# Patient Record
Sex: MALE | Race: WHITE | Employment: OTHER | URBAN - METROPOLITAN AREA
[De-identification: names, ages, dates, MRNs, and addresses within clinical notes are randomized per-mention and may not be internally consistent; named-entity substitution may affect disease eponyms.]

---

## 2017-12-06 PROBLEM — S72.001A CLOSED RIGHT HIP FRACTURE, INITIAL ENCOUNTER (HCC): Status: ACTIVE | Noted: 2017-12-06

## 2017-12-06 PROBLEM — S72.011A CLOSED SUBCAPITAL FRACTURE OF RIGHT FEMUR (HCC): Status: ACTIVE | Noted: 2017-12-06

## 2017-12-08 PROBLEM — E44.0 MODERATE PROTEIN-CALORIE MALNUTRITION (HCC): Chronic | Status: ACTIVE | Noted: 2017-12-08

## 2019-03-03 ENCOUNTER — APPOINTMENT (OUTPATIENT)
Dept: GENERAL RADIOLOGY | Age: 84
End: 2019-03-03
Payer: MEDICARE

## 2019-03-03 ENCOUNTER — HOSPITAL ENCOUNTER (OUTPATIENT)
Age: 84
Setting detail: OBSERVATION
Discharge: HOME | End: 2019-03-05
Attending: EMERGENCY MEDICINE | Admitting: INTERNAL MEDICINE
Payer: MEDICARE

## 2019-03-03 DIAGNOSIS — R55 SYNCOPE AND COLLAPSE: Primary | ICD-10-CM

## 2019-03-03 LAB
ALBUMIN SERPL-MCNC: 3.8 G/DL (ref 3.5–5.2)
ALP BLD-CCNC: 98 U/L (ref 40–129)
ALT SERPL-CCNC: 12 U/L (ref 0–40)
ANION GAP SERPL CALCULATED.3IONS-SCNC: 10 MMOL/L (ref 7–16)
AST SERPL-CCNC: 19 U/L (ref 0–39)
BASOPHILS ABSOLUTE: 0.01 E9/L (ref 0–0.2)
BASOPHILS RELATIVE PERCENT: 0.1 % (ref 0–2)
BILIRUB SERPL-MCNC: 0.5 MG/DL (ref 0–1.2)
BUN BLDV-MCNC: 16 MG/DL (ref 8–23)
CALCIUM SERPL-MCNC: 8.8 MG/DL (ref 8.6–10.2)
CHLORIDE BLD-SCNC: 104 MMOL/L (ref 98–107)
CHP ED QC CHECK: YES
CO2: 25 MMOL/L (ref 22–29)
CREAT SERPL-MCNC: 0.8 MG/DL (ref 0.7–1.2)
EOSINOPHILS ABSOLUTE: 0 E9/L (ref 0.05–0.5)
EOSINOPHILS RELATIVE PERCENT: 0 % (ref 0–6)
GFR AFRICAN AMERICAN: >60
GFR NON-AFRICAN AMERICAN: >60 ML/MIN/1.73
GLUCOSE BLD-MCNC: 145 MG/DL
GLUCOSE BLD-MCNC: 164 MG/DL (ref 74–99)
HCT VFR BLD CALC: 43.9 % (ref 37–54)
HEMOGLOBIN: 14.7 G/DL (ref 12.5–16.5)
IMMATURE GRANULOCYTES #: 0.04 E9/L
IMMATURE GRANULOCYTES %: 0.4 % (ref 0–5)
LYMPHOCYTES ABSOLUTE: 0.8 E9/L (ref 1.5–4)
LYMPHOCYTES RELATIVE PERCENT: 8.1 % (ref 20–42)
MCH RBC QN AUTO: 32 PG (ref 26–35)
MCHC RBC AUTO-ENTMCNC: 33.5 % (ref 32–34.5)
MCV RBC AUTO: 95.6 FL (ref 80–99.9)
METER GLUCOSE: 145 MG/DL (ref 74–99)
MONOCYTES ABSOLUTE: 0.72 E9/L (ref 0.1–0.95)
MONOCYTES RELATIVE PERCENT: 7.3 % (ref 2–12)
NEUTROPHILS ABSOLUTE: 8.25 E9/L (ref 1.8–7.3)
NEUTROPHILS RELATIVE PERCENT: 84.1 % (ref 43–80)
PDW BLD-RTO: 13.4 FL (ref 11.5–15)
PLATELET # BLD: 163 E9/L (ref 130–450)
PMV BLD AUTO: 9.1 FL (ref 7–12)
POTASSIUM SERPL-SCNC: 4.4 MMOL/L (ref 3.5–5)
RBC # BLD: 4.59 E12/L (ref 3.8–5.8)
SODIUM BLD-SCNC: 139 MMOL/L (ref 132–146)
TOTAL PROTEIN: 6.6 G/DL (ref 6.4–8.3)
TROPONIN: <0.01 NG/ML (ref 0–0.03)
WBC # BLD: 9.8 E9/L (ref 4.5–11.5)

## 2019-03-03 PROCEDURE — G0378 HOSPITAL OBSERVATION PER HR: HCPCS

## 2019-03-03 PROCEDURE — 71045 X-RAY EXAM CHEST 1 VIEW: CPT

## 2019-03-03 PROCEDURE — 2580000003 HC RX 258: Performed by: INTERNAL MEDICINE

## 2019-03-03 PROCEDURE — 85025 COMPLETE CBC W/AUTO DIFF WBC: CPT

## 2019-03-03 PROCEDURE — 80053 COMPREHEN METABOLIC PANEL: CPT

## 2019-03-03 PROCEDURE — 84484 ASSAY OF TROPONIN QUANT: CPT

## 2019-03-03 PROCEDURE — 36415 COLL VENOUS BLD VENIPUNCTURE: CPT

## 2019-03-03 PROCEDURE — 82962 GLUCOSE BLOOD TEST: CPT

## 2019-03-03 PROCEDURE — 99285 EMERGENCY DEPT VISIT HI MDM: CPT

## 2019-03-03 PROCEDURE — 96360 HYDRATION IV INFUSION INIT: CPT

## 2019-03-03 PROCEDURE — 96361 HYDRATE IV INFUSION ADD-ON: CPT

## 2019-03-03 PROCEDURE — 93005 ELECTROCARDIOGRAM TRACING: CPT | Performed by: EMERGENCY MEDICINE

## 2019-03-03 PROCEDURE — 94760 N-INVAS EAR/PLS OXIMETRY 1: CPT

## 2019-03-03 RX ORDER — CARBAMAZEPINE 200 MG/1
200 TABLET ORAL
Status: DISCONTINUED | OUTPATIENT
Start: 2019-03-04 | End: 2019-03-05 | Stop reason: HOSPADM

## 2019-03-03 RX ORDER — SODIUM CHLORIDE 9 MG/ML
INJECTION, SOLUTION INTRAVENOUS CONTINUOUS
Status: DISCONTINUED | OUTPATIENT
Start: 2019-03-03 | End: 2019-03-04

## 2019-03-03 RX ORDER — TAMSULOSIN HYDROCHLORIDE 0.4 MG/1
0.4 CAPSULE ORAL
Status: DISCONTINUED | OUTPATIENT
Start: 2019-03-03 | End: 2019-03-05 | Stop reason: HOSPADM

## 2019-03-03 RX ORDER — METOPROLOL SUCCINATE 25 MG/1
12.5 TABLET, EXTENDED RELEASE ORAL
Status: DISCONTINUED | OUTPATIENT
Start: 2019-03-04 | End: 2019-03-05 | Stop reason: HOSPADM

## 2019-03-03 RX ORDER — ASPIRIN 81 MG/1
81 TABLET, CHEWABLE ORAL
Status: DISCONTINUED | OUTPATIENT
Start: 2019-03-04 | End: 2019-03-05 | Stop reason: HOSPADM

## 2019-03-03 RX ORDER — FINASTERIDE 5 MG/1
5 TABLET, FILM COATED ORAL
Status: DISCONTINUED | OUTPATIENT
Start: 2019-03-04 | End: 2019-03-05 | Stop reason: HOSPADM

## 2019-03-03 RX ADMIN — SODIUM CHLORIDE: 9 INJECTION, SOLUTION INTRAVENOUS at 23:05

## 2019-03-04 ENCOUNTER — APPOINTMENT (OUTPATIENT)
Dept: CT IMAGING | Age: 84
End: 2019-03-04
Payer: MEDICARE

## 2019-03-04 PROBLEM — I10 ESSENTIAL HYPERTENSION: Chronic | Status: ACTIVE | Noted: 2019-03-04

## 2019-03-04 PROBLEM — G40.909 SEIZURE DISORDER (HCC): Chronic | Status: ACTIVE | Noted: 2019-03-04

## 2019-03-04 PROBLEM — E44.0 MODERATE PROTEIN-CALORIE MALNUTRITION (HCC): Chronic | Status: RESOLVED | Noted: 2017-12-08 | Resolved: 2019-03-04

## 2019-03-04 PROBLEM — S72.011A CLOSED SUBCAPITAL FRACTURE OF RIGHT FEMUR (HCC): Status: RESOLVED | Noted: 2017-12-06 | Resolved: 2019-03-04

## 2019-03-04 LAB
ANION GAP SERPL CALCULATED.3IONS-SCNC: 12 MMOL/L (ref 7–16)
BASOPHILS ABSOLUTE: 0.02 E9/L (ref 0–0.2)
BASOPHILS RELATIVE PERCENT: 0.3 % (ref 0–2)
BUN BLDV-MCNC: 18 MG/DL (ref 8–23)
CALCIUM SERPL-MCNC: 8.7 MG/DL (ref 8.6–10.2)
CHLORIDE BLD-SCNC: 104 MMOL/L (ref 98–107)
CO2: 24 MMOL/L (ref 22–29)
CREAT SERPL-MCNC: 0.9 MG/DL (ref 0.7–1.2)
EOSINOPHILS ABSOLUTE: 0 E9/L (ref 0.05–0.5)
EOSINOPHILS RELATIVE PERCENT: 0 % (ref 0–6)
GFR AFRICAN AMERICAN: >60
GFR NON-AFRICAN AMERICAN: >60 ML/MIN/1.73
GLUCOSE BLD-MCNC: 98 MG/DL (ref 74–99)
HCT VFR BLD CALC: 41.1 % (ref 37–54)
HEMOGLOBIN: 14 G/DL (ref 12.5–16.5)
IMMATURE GRANULOCYTES #: 0.03 E9/L
IMMATURE GRANULOCYTES %: 0.4 % (ref 0–5)
LV EF: 60 %
LVEF MODALITY: NORMAL
LYMPHOCYTES ABSOLUTE: 1.62 E9/L (ref 1.5–4)
LYMPHOCYTES RELATIVE PERCENT: 23.7 % (ref 20–42)
MCH RBC QN AUTO: 32.4 PG (ref 26–35)
MCHC RBC AUTO-ENTMCNC: 34.1 % (ref 32–34.5)
MCV RBC AUTO: 95.1 FL (ref 80–99.9)
MONOCYTES ABSOLUTE: 0.93 E9/L (ref 0.1–0.95)
MONOCYTES RELATIVE PERCENT: 13.6 % (ref 2–12)
NEUTROPHILS ABSOLUTE: 4.23 E9/L (ref 1.8–7.3)
NEUTROPHILS RELATIVE PERCENT: 62 % (ref 43–80)
PDW BLD-RTO: 13.5 FL (ref 11.5–15)
PLATELET # BLD: 158 E9/L (ref 130–450)
PMV BLD AUTO: 9 FL (ref 7–12)
POTASSIUM SERPL-SCNC: 4.2 MMOL/L (ref 3.5–5)
RBC # BLD: 4.32 E12/L (ref 3.8–5.8)
SODIUM BLD-SCNC: 140 MMOL/L (ref 132–146)
WBC # BLD: 6.8 E9/L (ref 4.5–11.5)

## 2019-03-04 PROCEDURE — 99218 PR INITIAL OBSERVATION CARE/DAY 30 MINUTES: CPT | Performed by: PSYCHIATRY & NEUROLOGY

## 2019-03-04 PROCEDURE — G0378 HOSPITAL OBSERVATION PER HR: HCPCS

## 2019-03-04 PROCEDURE — 6370000000 HC RX 637 (ALT 250 FOR IP): Performed by: INTERNAL MEDICINE

## 2019-03-04 PROCEDURE — 93306 TTE W/DOPPLER COMPLETE: CPT

## 2019-03-04 PROCEDURE — 80048 BASIC METABOLIC PNL TOTAL CA: CPT

## 2019-03-04 PROCEDURE — 70450 CT HEAD/BRAIN W/O DYE: CPT

## 2019-03-04 PROCEDURE — 85025 COMPLETE CBC W/AUTO DIFF WBC: CPT

## 2019-03-04 PROCEDURE — 96372 THER/PROPH/DIAG INJ SC/IM: CPT

## 2019-03-04 PROCEDURE — 6360000002 HC RX W HCPCS: Performed by: INTERNAL MEDICINE

## 2019-03-04 PROCEDURE — 36415 COLL VENOUS BLD VENIPUNCTURE: CPT

## 2019-03-04 RX ADMIN — ASPIRIN 81 MG 81 MG: 81 TABLET ORAL at 06:32

## 2019-03-04 RX ADMIN — METOPROLOL SUCCINATE 12.5 MG: 25 TABLET, EXTENDED RELEASE ORAL at 06:33

## 2019-03-04 RX ADMIN — CARBAMAZEPINE 200 MG: 200 TABLET ORAL at 06:33

## 2019-03-04 RX ADMIN — ENOXAPARIN SODIUM 30 MG: 30 INJECTION SUBCUTANEOUS at 10:12

## 2019-03-04 RX ADMIN — TAMSULOSIN HYDROCHLORIDE 0.4 MG: 0.4 CAPSULE ORAL at 16:06

## 2019-03-04 RX ADMIN — FINASTERIDE 5 MG: 5 TABLET, FILM COATED ORAL at 06:33

## 2019-03-04 ASSESSMENT — ENCOUNTER SYMPTOMS
PHOTOPHOBIA: 0
BACK PAIN: 0
CHEST TIGHTNESS: 0
SHORTNESS OF BREATH: 0
COLOR CHANGE: 0

## 2019-03-04 ASSESSMENT — VISUAL ACUITY: VA_NORMAL: 1

## 2019-03-04 ASSESSMENT — PAIN SCALES - GENERAL
PAINLEVEL_OUTOF10: 0

## 2019-03-05 ENCOUNTER — APPOINTMENT (OUTPATIENT)
Dept: NEUROLOGY | Age: 84
End: 2019-03-05
Payer: MEDICARE

## 2019-03-05 VITALS
RESPIRATION RATE: 18 BRPM | OXYGEN SATURATION: 96 % | HEART RATE: 66 BPM | HEIGHT: 72 IN | SYSTOLIC BLOOD PRESSURE: 178 MMHG | WEIGHT: 175 LBS | TEMPERATURE: 99 F | DIASTOLIC BLOOD PRESSURE: 80 MMHG | BODY MASS INDEX: 23.7 KG/M2

## 2019-03-05 LAB
EKG ATRIAL RATE: 86 BPM
EKG P AXIS: 59 DEGREES
EKG P-R INTERVAL: 222 MS
EKG Q-T INTERVAL: 382 MS
EKG QRS DURATION: 94 MS
EKG QTC CALCULATION (BAZETT): 457 MS
EKG R AXIS: -61 DEGREES
EKG T AXIS: 62 DEGREES
EKG VENTRICULAR RATE: 86 BPM

## 2019-03-05 PROCEDURE — 95819 EEG AWAKE AND ASLEEP: CPT

## 2019-03-05 PROCEDURE — 99225 PR SBSQ OBSERVATION CARE/DAY 25 MINUTES: CPT | Performed by: NURSE PRACTITIONER

## 2019-03-05 PROCEDURE — 95819 EEG AWAKE AND ASLEEP: CPT | Performed by: PSYCHIATRY & NEUROLOGY

## 2019-03-05 PROCEDURE — 6370000000 HC RX 637 (ALT 250 FOR IP): Performed by: INTERNAL MEDICINE

## 2019-03-05 PROCEDURE — G0378 HOSPITAL OBSERVATION PER HR: HCPCS

## 2019-03-05 RX ADMIN — FINASTERIDE 5 MG: 5 TABLET, FILM COATED ORAL at 06:38

## 2019-03-05 RX ADMIN — METOPROLOL SUCCINATE 12.5 MG: 25 TABLET, EXTENDED RELEASE ORAL at 06:48

## 2019-03-05 RX ADMIN — ASPIRIN 81 MG 81 MG: 81 TABLET ORAL at 06:38

## 2019-03-05 RX ADMIN — CARBAMAZEPINE 200 MG: 200 TABLET ORAL at 06:38

## 2019-03-05 ASSESSMENT — PAIN SCALES - GENERAL
PAINLEVEL_OUTOF10: 0
PAINLEVEL_OUTOF10: 0

## 2019-05-01 ENCOUNTER — APPOINTMENT (OUTPATIENT)
Dept: GENERAL RADIOLOGY | Age: 84
End: 2019-05-01
Payer: MEDICARE

## 2019-05-01 ENCOUNTER — APPOINTMENT (OUTPATIENT)
Dept: GENERAL RADIOLOGY | Age: 84
DRG: 200 | End: 2019-05-01
Payer: MEDICARE

## 2019-05-01 ENCOUNTER — APPOINTMENT (OUTPATIENT)
Dept: CT IMAGING | Age: 84
End: 2019-05-01
Payer: MEDICARE

## 2019-05-01 ENCOUNTER — HOSPITAL ENCOUNTER (INPATIENT)
Age: 84
LOS: 5 days | Discharge: NURSING, REHAB OR LONG-TERM CARE | DRG: 200 | End: 2019-05-06
Attending: EMERGENCY MEDICINE | Admitting: SURGERY
Payer: MEDICARE

## 2019-05-01 ENCOUNTER — HOSPITAL ENCOUNTER (OUTPATIENT)
Age: 84
Discharge: HOME | End: 2019-05-01
Payer: MEDICARE

## 2019-05-01 ENCOUNTER — HOSPITAL ENCOUNTER (EMERGENCY)
Age: 84
Discharge: ANOTHER HOSPITAL | End: 2019-05-01
Attending: EMERGENCY MEDICINE
Payer: MEDICARE

## 2019-05-01 VITALS
TEMPERATURE: 98.1 F | DIASTOLIC BLOOD PRESSURE: 80 MMHG | RESPIRATION RATE: 16 BRPM | OXYGEN SATURATION: 93 % | BODY MASS INDEX: 23.7 KG/M2 | HEIGHT: 72 IN | HEART RATE: 75 BPM | WEIGHT: 175 LBS | SYSTOLIC BLOOD PRESSURE: 191 MMHG

## 2019-05-01 DIAGNOSIS — S42.412A CLOSED SUPRACONDYLAR FRACTURE OF LEFT HUMERUS, INITIAL ENCOUNTER: ICD-10-CM

## 2019-05-01 DIAGNOSIS — S42.115A CLOSED NONDISPLACED FRACTURE OF BODY OF LEFT SCAPULA, INITIAL ENCOUNTER: ICD-10-CM

## 2019-05-01 DIAGNOSIS — S22.32XA CLOSED FRACTURE OF ONE RIB OF LEFT SIDE, INITIAL ENCOUNTER: ICD-10-CM

## 2019-05-01 DIAGNOSIS — T14.90XA TRAUMA: Primary | ICD-10-CM

## 2019-05-01 DIAGNOSIS — J93.83 ACUTE PNEUMOTHORAX: ICD-10-CM
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
## 2019-05-01 LAB
ANION GAP SERPL CALCULATED.3IONS-SCNC: 9 MMOL/L (ref 7–16)
BASOPHILS ABSOLUTE: 0.02 E9/L (ref 0–0.2)
BASOPHILS RELATIVE PERCENT: 0.2 % (ref 0–2)
BUN BLDV-MCNC: 23 MG/DL (ref 8–23)
CALCIUM SERPL-MCNC: 9 MG/DL (ref 8.6–10.2)
CHLORIDE BLD-SCNC: 104 MMOL/L (ref 98–107)
CO2: 26 MMOL/L (ref 22–29)
CREAT SERPL-MCNC: 0.7 MG/DL (ref 0.7–1.2)
EOSINOPHILS ABSOLUTE: 0.03 E9/L (ref 0.05–0.5)
EOSINOPHILS RELATIVE PERCENT: 0.3 % (ref 0–6)
GFR AFRICAN AMERICAN: >60
GFR NON-AFRICAN AMERICAN: >60 ML/MIN/1.73
GLUCOSE BLD-MCNC: 114 MG/DL (ref 74–99)
HCT VFR BLD CALC: 40.8 % (ref 37–54)
HEMOGLOBIN: 13.9 G/DL (ref 12.5–16.5)
IMMATURE GRANULOCYTES #: 0.05 E9/L
IMMATURE GRANULOCYTES %: 0.5 % (ref 0–5)
LYMPHOCYTES ABSOLUTE: 1.45 E9/L (ref 1.5–4)
LYMPHOCYTES RELATIVE PERCENT: 14.8 % (ref 20–42)
MCH RBC QN AUTO: 32.7 PG (ref 26–35)
MCHC RBC AUTO-ENTMCNC: 34.1 % (ref 32–34.5)
MCV RBC AUTO: 96 FL (ref 80–99.9)
MONOCYTES ABSOLUTE: 0.71 E9/L (ref 0.1–0.95)
MONOCYTES RELATIVE PERCENT: 7.3 % (ref 2–12)
NEUTROPHILS ABSOLUTE: 7.51 E9/L (ref 1.8–7.3)
NEUTROPHILS RELATIVE PERCENT: 76.9 % (ref 43–80)
PDW BLD-RTO: 13.8 FL (ref 11.5–15)
PLATELET # BLD: 142 E9/L (ref 130–450)
PMV BLD AUTO: 9.3 FL (ref 7–12)
POTASSIUM SERPL-SCNC: 4.4 MMOL/L (ref 3.5–5)
RBC # BLD: 4.25 E12/L (ref 3.8–5.8)
SODIUM BLD-SCNC: 139 MMOL/L (ref 132–146)
TROPONIN: <0.01 NG/ML (ref 0–0.03)
WBC # BLD: 9.8 E9/L (ref 4.5–11.5)

## 2019-05-01 PROCEDURE — 2500000003 HC RX 250 WO HCPCS

## 2019-05-01 PROCEDURE — 99285 EMERGENCY DEPT VISIT HI MDM: CPT

## 2019-05-01 PROCEDURE — 84484 ASSAY OF TROPONIN QUANT: CPT

## 2019-05-01 PROCEDURE — 71250 CT THORAX DX C-: CPT

## 2019-05-01 PROCEDURE — 73030 X-RAY EXAM OF SHOULDER: CPT

## 2019-05-01 PROCEDURE — 96374 THER/PROPH/DIAG INJ IV PUSH: CPT

## 2019-05-01 PROCEDURE — 32551 INSERTION OF CHEST TUBE: CPT

## 2019-05-01 PROCEDURE — A0426 ALS 1: HCPCS

## 2019-05-01 PROCEDURE — 70450 CT HEAD/BRAIN W/O DYE: CPT

## 2019-05-01 PROCEDURE — 71045 X-RAY EXAM CHEST 1 VIEW: CPT

## 2019-05-01 PROCEDURE — 94760 N-INVAS EAR/PLS OXIMETRY 1: CPT

## 2019-05-01 PROCEDURE — 85025 COMPLETE CBC W/AUTO DIFF WBC: CPT

## 2019-05-01 PROCEDURE — 2060000000 HC ICU INTERMEDIATE R&B

## 2019-05-01 PROCEDURE — 99284 EMERGENCY DEPT VISIT MOD MDM: CPT

## 2019-05-01 PROCEDURE — 6370000000 HC RX 637 (ALT 250 FOR IP): Performed by: EMERGENCY MEDICINE

## 2019-05-01 PROCEDURE — 73060 X-RAY EXAM OF HUMERUS: CPT

## 2019-05-01 PROCEDURE — 71101 X-RAY EXAM UNILAT RIBS/CHEST: CPT

## 2019-05-01 PROCEDURE — 80048 BASIC METABOLIC PNL TOTAL CA: CPT

## 2019-05-01 PROCEDURE — 93005 ELECTROCARDIOGRAM TRACING: CPT | Performed by: EMERGENCY MEDICINE

## 2019-05-01 PROCEDURE — 6360000002 HC RX W HCPCS

## 2019-05-01 PROCEDURE — A0425 GROUND MILEAGE: HCPCS

## 2019-05-01 PROCEDURE — 72125 CT NECK SPINE W/O DYE: CPT

## 2019-05-01 RX ORDER — DOCUSATE SODIUM 100 MG/1
100 CAPSULE, LIQUID FILLED ORAL 2 TIMES DAILY
Status: DISCONTINUED | OUTPATIENT
Start: 2019-05-02 | End: 2019-05-06 | Stop reason: HOSPADM

## 2019-05-01 RX ORDER — ACETAMINOPHEN 650 MG
TABLET, EXTENDED RELEASE ORAL
Status: COMPLETED
Start: 2019-05-01 | End: 2019-05-01

## 2019-05-01 RX ORDER — FENTANYL CITRATE 50 UG/ML
50 INJECTION, SOLUTION INTRAMUSCULAR; INTRAVENOUS ONCE
Status: COMPLETED | OUTPATIENT
Start: 2019-05-01 | End: 2019-05-01

## 2019-05-01 RX ORDER — MORPHINE SULFATE 2 MG/ML
2 INJECTION, SOLUTION INTRAMUSCULAR; INTRAVENOUS EVERY 4 HOURS PRN
Status: DISCONTINUED | OUTPATIENT
Start: 2019-05-01 | End: 2019-05-06

## 2019-05-01 RX ORDER — SODIUM CHLORIDE 0.9 % (FLUSH) 0.9 %
10 SYRINGE (ML) INJECTION PRN
Status: DISCONTINUED | OUTPATIENT
Start: 2019-05-01 | End: 2019-05-06 | Stop reason: HOSPADM

## 2019-05-01 RX ORDER — OXYCODONE HYDROCHLORIDE 5 MG/1
5 TABLET ORAL EVERY 4 HOURS PRN
Status: DISCONTINUED | OUTPATIENT
Start: 2019-05-01 | End: 2019-05-06

## 2019-05-01 RX ORDER — OXYCODONE HYDROCHLORIDE 10 MG/1
10 TABLET ORAL EVERY 4 HOURS PRN
Status: DISCONTINUED | OUTPATIENT
Start: 2019-05-01 | End: 2019-05-06

## 2019-05-01 RX ORDER — LIDOCAINE HYDROCHLORIDE 10 MG/ML
INJECTION, SOLUTION INFILTRATION; PERINEURAL
Status: COMPLETED
Start: 2019-05-01 | End: 2019-05-01

## 2019-05-01 RX ORDER — SODIUM CHLORIDE 0.9 % (FLUSH) 0.9 %
10 SYRINGE (ML) INJECTION EVERY 12 HOURS SCHEDULED
Status: DISCONTINUED | OUTPATIENT
Start: 2019-05-02 | End: 2019-05-06 | Stop reason: HOSPADM

## 2019-05-01 RX ORDER — FENTANYL CITRATE 50 UG/ML
INJECTION, SOLUTION INTRAMUSCULAR; INTRAVENOUS
Status: COMPLETED
Start: 2019-05-01 | End: 2019-05-01

## 2019-05-01 RX ORDER — LIDOCAINE HYDROCHLORIDE 10 MG/ML
5 INJECTION, SOLUTION INFILTRATION; PERINEURAL ONCE
Status: COMPLETED | OUTPATIENT
Start: 2019-05-01 | End: 2019-05-01

## 2019-05-01 RX ORDER — ONDANSETRON 2 MG/ML
4 INJECTION INTRAMUSCULAR; INTRAVENOUS EVERY 6 HOURS PRN
Status: DISCONTINUED | OUTPATIENT
Start: 2019-05-01 | End: 2019-05-06 | Stop reason: HOSPADM

## 2019-05-01 RX ORDER — ACETAMINOPHEN 325 MG/1
650 TABLET ORAL EVERY 4 HOURS PRN
Status: DISCONTINUED | OUTPATIENT
Start: 2019-05-01 | End: 2019-05-06 | Stop reason: HOSPADM

## 2019-05-01 RX ORDER — SENNA PLUS 8.6 MG/1
1 TABLET ORAL DAILY PRN
Status: DISCONTINUED | OUTPATIENT
Start: 2019-05-01 | End: 2019-05-05

## 2019-05-01 RX ORDER — ACETAMINOPHEN 325 MG/1
650 TABLET ORAL ONCE
Status: COMPLETED | OUTPATIENT
Start: 2019-05-01 | End: 2019-05-01

## 2019-05-01 RX ADMIN — FENTANYL CITRATE 50 MCG: 50 INJECTION, SOLUTION INTRAMUSCULAR; INTRAVENOUS at 19:17

## 2019-05-01 RX ADMIN — ACETAMINOPHEN 650 MG: 325 TABLET ORAL at 17:22

## 2019-05-01 RX ADMIN — Medication: at 19:41

## 2019-05-01 RX ADMIN — LIDOCAINE HYDROCHLORIDE 5 ML: 10 INJECTION, SOLUTION INFILTRATION; PERINEURAL at 19:40

## 2019-05-01 ASSESSMENT — PAIN DESCRIPTION - ORIENTATION: ORIENTATION: LEFT

## 2019-05-01 ASSESSMENT — PAIN DESCRIPTION - LOCATION: LOCATION: RIB CAGE;ARM

## 2019-05-01 ASSESSMENT — ENCOUNTER SYMPTOMS
BACK PAIN: 0
COUGH: 0
SHORTNESS OF BREATH: 0
EYE PAIN: 0
EYE REDNESS: 0
ABDOMINAL PAIN: 0
VOMITING: 0
EYE DISCHARGE: 0
NAUSEA: 0
SORE THROAT: 0
SINUS PRESSURE: 0
DIARRHEA: 0
WHEEZING: 0

## 2019-05-01 ASSESSMENT — PAIN DESCRIPTION - PAIN TYPE: TYPE: ACUTE PAIN

## 2019-05-01 ASSESSMENT — PAIN SCALES - GENERAL
PAINLEVEL_OUTOF10: 0
PAINLEVEL_OUTOF10: 6

## 2019-05-01 ASSESSMENT — PAIN DESCRIPTION - DESCRIPTORS: DESCRIPTORS: ACHING

## 2019-05-01 ASSESSMENT — PAIN - FUNCTIONAL ASSESSMENT: PAIN_FUNCTIONAL_ASSESSMENT: ACTIVITIES ARE NOT PREVENTED

## 2019-05-01 NOTE — ED PROVIDER NOTES
78-year-old male history of hypertension on no anticoagulation. Presents emergency department after a fall yesterday approximately 25 hours prior to arrival patient states he hit his left shoulder and ribs when he tripped over a rug. He is unsure if he hit his head. Patient states left shoulder and left rib pain at this time. He denies other injuries denies dizziness shortness of breath, abdominal pain, or extremity injuries outside left shoulder. The history is provided by the patient. Fall   The accident occurred yesterday. He fell from a height of 1 to 2 ft. He landed on carpet. There was no blood loss. The point of impact was the left shoulder (left ribs). The pain is present in the left shoulder. The pain is at a severity of 2/10. The pain is mild. He was ambulatory at the scene. There was no entrapment after the fall. There was no drug use involved in the accident. There was no alcohol use involved in the accident. Pertinent negatives include no fever, no abdominal pain, no nausea, no vomiting, no headaches, no hearing loss and no loss of consciousness. He has tried nothing for the symptoms. The treatment provided no relief. Review of Systems   Constitutional: Negative for chills and fever. HENT: Negative for ear pain, sinus pressure and sore throat. Eyes: Negative for pain, discharge and redness. Respiratory: Negative for cough, shortness of breath and wheezing. Cardiovascular: Positive for chest pain. Gastrointestinal: Negative for abdominal pain, diarrhea, nausea and vomiting. Genitourinary: Negative for dysuria and frequency. Musculoskeletal: Positive for arthralgias. Negative for back pain. Skin: Negative for rash and wound. Neurological: Negative for loss of consciousness, weakness and headaches. Hematological: Negative for adenopathy. All other systems reviewed and are negative. Physical Exam   Constitutional: He is oriented to person, place, and time.  He appears well-developed and well-nourished. No distress. HENT:   Head: Normocephalic and atraumatic. Eyes: Pupils are equal, round, and reactive to light. EOM are normal.   Neck: Normal range of motion. Neck supple. Cardiovascular: Normal rate and regular rhythm. Pulmonary/Chest: He exhibits bony tenderness. He exhibits no crepitus. Neurological: He is alert and oriented to person, place, and time. Chest Tube  Date/Time: 5/1/2019 7:43 PM  Performed by: Carol Fitzgerald DO  Authorized by: Omid Downey MD     Consent:     Consent obtained:  Written    Consent given by:  Patient    Risks discussed:  Bleeding, damage to surrounding structures, infection, nerve damage and pain  Pre-procedure details:     Skin preparation:  Betadine    Preparation: Patient was prepped and draped in the usual sterile fashion    Sedation:     Sedation type: Anxiolysis  Anesthesia (see MAR for exact dosages): Anesthesia method:  Local infiltration    Local anesthetic:  Lidocaine 1% w/o epi  Procedure details:     Placement location:  L lateral    Scalpel size:  11    Tube size ([place]): 9 [place]. Ultrasound guidance: no      Tension pneumothorax: no      Tube connected to:  Suction    Drainage characteristics:  Air only    Suture material:  2-0 silk    Dressing:  4x4 sterile gauze, petrolatum-impregnated gauze and Xeroform gauze  Post-procedure details:     Post-insertion x-ray findings comment:  Tube in place but lung did not reexpand    Patient tolerance of procedure: Tolerated well, no immediate complications        MDM    ED Course as of May 01 2221   Wed May 01, 2019   1556 Patient seen and examined concern for trauma. X-rays and CT of the abdomen ordered. Patient offered Tylenol 2. he states he does not want    [CF]   1942 Patient found to have pneumothorax on chest CT. He was given pain medication at a chest tube was placed.  I did speak with Dr. Tip Payton at the [address] [place] [address] emergency department for transfer consultation. He was accepted auto except by trauma services at [facility]. [CF]   1943 Imaging did not indicate a reexpansion of the left lung after chest tube was placed. These findings were updated to Dr. Emilee Pearson    [CF]      ED Course User Index  [CF] Benny Daniel, DO     -------------------------------------------- PAST HISTORY ---------------------------------------------  Past Medical History:  has a past medical history of Hypertension. Past Surgical History:  has a past surgical history that includes hernia repair and hip surgery (Right, 12/07/2017). Social History:  reports that he quit smoking about 74 years ago. He quit after 2.00 years of use. He has never used smokeless tobacco. He reports that he drinks alcohol. He reports that he does not use drugs. Family History: family history is not on file. The patients home medications have been reviewed. Allergies: Patient has no known allergies.     -------------------------------------------------- RESULTS -------------------------------------------------    Lab  Results for orders placed or performed during the hospital encounter of 05/01/19   CBC auto differential   Result Value Ref Range    WBC 9.8 4.5 - 11.5 E9/L    RBC 4.25 3.80 - 5.80 E12/L    Hemoglobin 13.9 12.5 - 16.5 g/dL    Hematocrit 40.8 37.0 - 54.0 %    MCV 96.0 80.0 - 99.9 fL    MCH 32.7 26.0 - 35.0 pg    MCHC 34.1 32.0 - 34.5 %    RDW 13.8 11.5 - 15.0 fL    Platelets 813 130 - 444 E9/L    MPV 9.3 7.0 - 12.0 fL    Neutrophils % 76.9 43.0 - 80.0 %    Immature Granulocytes % 0.5 0.0 - 5.0 %    Lymphocytes % 14.8 (L) 20.0 - 42.0 %    Monocytes % 7.3 2.0 - 12.0 %    Eosinophils % 0.3 0.0 - 6.0 %    Basophils % 0.2 0.0 - 2.0 %    Neutrophils # 7.51 (H) 1.80 - 7.30 E9/L    Immature Granulocytes # 0.05 E9/L    Lymphocytes # 1.45 (L) 1.50 - 4.00 E9/L    Monocytes # 0.71 0.10 - 0.95 E9/L    Eosinophils # 0.03 (L) 0.05 - 0.50 E9/L    Basophils # 0.02 0.00 - 0.20 E9/L Basic Metabolic Panel   Result Value Ref Range    Sodium 139 132 - 146 mmol/L    Potassium 4.4 3.5 - 5.0 mmol/L    Chloride 104 98 - 107 mmol/L    CO2 26 22 - 29 mmol/L    Anion Gap 9 7 - 16 mmol/L    Glucose 114 (H) 74 - 99 mg/dL    BUN 23 8 - 23 mg/dL    CREATININE 0.7 0.7 - 1.2 mg/dL    GFR Non-African American >60 >=60 mL/min/1.73    GFR African American >60     Calcium 9.0 8.6 - 10.2 mg/dL   Troponin   Result Value Ref Range    Troponin <0.01 0.00 - 0.03 ng/mL       Radiology  Xr Ribs Left Include Chest (min 3 Views)    Result Date: 2019  Patient MRN: [id number] : 1922 Age:  80 years Gender: Male Order Date: 2019 3:30 PM. Exam: XR RIBS LEFT INCLUDE CHEST (MIN 3 VIEWS) Number of Views: 5 Indication:  Left shoulder rib and arm pain Comparison: Radiographs 3/3/2019 Findings: Stable, enlarged cardiomediastinal silhouette with an abnormal masslike density projecting along the left cardiac border measured approximately 3.1 x 3.3 cm, not identified on the previous exam. Vascular calcifications thoracic aorta. Osteopenia. Age-indeterminate fracture of the left proximal humerus. Comminuted fracture of the left scapula. . Vascular calcifications thoracic aorta. No pneumothorax. No definitive displaced fracture of the left ribs is seen on limited fracture evaluation. ALERT:  THIS IS AN ABNORMAL REPORT 1. Comminuted left scapular fracture. 2. Age-indeterminate fracture left proximal humerus. 3. Nodular abnormality or mass projecting along the left cardiac border not seen on previous exam. Findings are indeterminate and could reflect pulmonary mass. The possibility of a cardiac aneurysm is also not excluded. Further evaluation with CT scan of the chest is recommended.  4. No definitive displaced rib fracture is noted, this can be further evaluated with the above recommended CT scan of the chest.    Xr Humerus Left (min 2 Views)    Result Date: 2019  Patient MRN: [id number] : 1922 Age:  80 years Gender: Male Order Date: 5/1/2019 3:30 PM Exam: XR HUMERUS LEFT (MIN 2 VIEWS) Number of Images: 4 views Indication:   Pain Pain Comparison: None. Findings: There is left humeral neck fracture which is displaced superiorly. There is severe osteopenia of the osseous structure. There is a horizontal fracture of the scapula extending to the glenoid fossa. . The shoulder joint and elbow joint appear to be normal.     Left humeral neck fracture which is displaced superiorly Left scapula fracture extending to the glenoid fossa. Severe osteopenia. ALERT:  THIS IS AN ABNORMAL REPORT    Ct Head Wo Contrast    Result Date: 5/1/2019  Clinical indications: Pain status post trauma. Cranial trauma. COMPARISON: March 4, 2019. Exposure control: This examination and all examinations utilizing ionizing radiation at this facility done so according to the ALARA (as low as reasonably achievable) principal for radiation dose reduction. Technique: Axial, sagittal and coronal computed tomography of the brain and calvarium was performed without contrast. FINDINGS: There is no evidence for acute intracranial hemorrhage, midline shift or mass effect. There is enlargement of the ventricles, sulci and cisterns bilaterally. There is patchy hypoattenuation in the periventricular deep white matter bilaterally. There are calcifications within the carotid siphons and vertebrobasilar arterial systems. Mucosal thickening within the paranasal sinuses but no fluid levels are evident. Otherwise the brain parenchyma, CSF spaces, paranasal sinuses and mastoid air cells and surrounding soft tissue and osseous structures have a satisfactory appearance. The gray-white matter junction is otherwise preserved. The cerebellopontine angle and cisterns are unremarkable. The bony calvarium is negative for acute fracture or aggressive process. The alexi and brainstem are unremarkable. No evidence for acute intracranial process.  Cortical atrophy and chronic There is a left scapular fracture. 4. 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 No acute mediastinal vascular trauma is noted. ALERT:  THIS IS AN ABNORMAL REPORT-left-sided pneumothorax and left scapular and seventh rib fractures Note: Emergency department caretaker was contacted telephonically by myself at the time of this dictation communicate the finding of a sizable left-sided pneumothorax without tension component. Ct Cervical Spine Wo Contrast    Result Date: 2019  Clinical indications: Pain status post trauma. Chest pain. Neck pain. Scapular fracture. COMPARISON: None. Exposure control: This examination and all examinations utilizing ionizing radiation at this facility done so according to the ALARA (as low as reasonably achievable) principal for radiation dose reduction. TECHNIQUE: Axial, sagittal, and coronal computed tomography of the cervical spine was performed without contrast. FINDINGS: There is a left apical pneumothorax. These images reveal no evidence for acute displaced cortical destruction or spondylolisthesis. There is diffuse loss of disc height, degenerative spondylosis, uncovertebral hypertrophy and facet arthropathy. These degenerative osseous changes result in multilevel central and foraminal stenosis. There are calcifications within the regional arteries. Otherwise regional soft tissue and osseous structures are unremarkable. Left apical pneumothorax. No acute fracture or spondylolisthesis is identified on CT of cervical spine. Diffuse degenerative disc and facet disease result in multilevel central and foraminal stenosis. Atherosclerotic vascular disease.     Xr Shoulder Left (min 2 Views)    Result Date: 2019  Patient MRN: [id number] : 1922 Age:  80 years Gender: Male Order Date: 2019 3:30 PM Exam: XR SHOULDER LEFT (MIN 2 VIEWS) Number of Images: 4 views Indication:   left shoulder

## 2019-05-01 NOTE — PROGRESS NOTES
[address] @ 3441, per Dr. Nelly Pollard. Requested Trauma transfer to [place] [address] for Multiple Rib FX, Pneumothorax, and \"L\" Shoulder FX. Access Center stated could not provide Trauma Accepting, would have to call back. Transferred to ED Attending: Dr. Franklin [place]. Dr. Nelly Pollard spoke to Dr. Franklin [place] @ this time. [facility] called back @ approx [phone]. Jose Porter advised Trauma auto accepting is Dr. Heidy Sher. Transferred call to pt nurse to determine transport needs.

## 2019-05-02 ENCOUNTER — APPOINTMENT (OUTPATIENT)
Dept: GENERAL RADIOLOGY | Age: 84
DRG: 200 | End: 2019-05-02
Payer: MEDICARE

## 2019-05-02 ENCOUNTER — APPOINTMENT (OUTPATIENT)
Dept: CT IMAGING | Age: 84
DRG: 200 | End: 2019-05-02
Payer: MEDICARE

## 2019-05-02 PROBLEM — J94.2 HEMOPNEUMOTHORAX ON LEFT: Status: ACTIVE | Noted: 2019-05-02

## 2019-05-02 PROBLEM — S22.42XA CLOSED FRACTURE OF MULTIPLE RIBS OF LEFT SIDE: Status: ACTIVE | Noted: 2019-05-02

## 2019-05-02 PROBLEM — S42.102A CLOSED FRACTURE OF LEFT SCAPULA: Status: ACTIVE | Noted: 2019-05-02

## 2019-05-02 LAB
ALBUMIN SERPL-MCNC: 3.5 G/DL (ref 3.5–5.2)
ALP BLD-CCNC: 78 U/L (ref 40–129)
ALT SERPL-CCNC: 14 U/L (ref 0–40)
ANION GAP SERPL CALCULATED.3IONS-SCNC: 12 MMOL/L (ref 7–16)
AST SERPL-CCNC: 20 U/L (ref 0–39)
BASOPHILS ABSOLUTE: 0.01 E9/L (ref 0–0.2)
BASOPHILS RELATIVE PERCENT: 0.1 % (ref 0–2)
BILIRUB SERPL-MCNC: 0.6 MG/DL (ref 0–1.2)
BUN BLDV-MCNC: 20 MG/DL (ref 8–23)
CALCIUM SERPL-MCNC: 8.6 MG/DL (ref 8.6–10.2)
CHLORIDE BLD-SCNC: 106 MMOL/L (ref 98–107)
CO2: 22 MMOL/L (ref 22–29)
CREAT SERPL-MCNC: 0.6 MG/DL (ref 0.7–1.2)
EKG ATRIAL RATE: 80 BPM
EKG P AXIS: 56 DEGREES
EKG P-R INTERVAL: 250 MS
EKG Q-T INTERVAL: 394 MS
EKG QRS DURATION: 92 MS
EKG QTC CALCULATION (BAZETT): 454 MS
EKG R AXIS: -53 DEGREES
EKG T AXIS: 43 DEGREES
EKG VENTRICULAR RATE: 80 BPM
EOSINOPHILS ABSOLUTE: 0.11 E9/L (ref 0.05–0.5)
EOSINOPHILS RELATIVE PERCENT: 1.2 % (ref 0–6)
GFR AFRICAN AMERICAN: >60
GFR NON-AFRICAN AMERICAN: >60 ML/MIN/1.73
GLUCOSE BLD-MCNC: 153 MG/DL (ref 74–99)
HCT VFR BLD CALC: 39 % (ref 37–54)
HEMOGLOBIN: 13.5 G/DL (ref 12.5–16.5)
IMMATURE GRANULOCYTES #: 0.04 E9/L
IMMATURE GRANULOCYTES %: 0.4 % (ref 0–5)
LYMPHOCYTES ABSOLUTE: 2.28 E9/L (ref 1.5–4)
LYMPHOCYTES RELATIVE PERCENT: 25.3 % (ref 20–42)
MCH RBC QN AUTO: 32.5 PG (ref 26–35)
MCHC RBC AUTO-ENTMCNC: 34.6 % (ref 32–34.5)
MCV RBC AUTO: 94 FL (ref 80–99.9)
MONOCYTES ABSOLUTE: 0.76 E9/L (ref 0.1–0.95)
MONOCYTES RELATIVE PERCENT: 8.4 % (ref 2–12)
NEUTROPHILS ABSOLUTE: 5.8 E9/L (ref 1.8–7.3)
NEUTROPHILS RELATIVE PERCENT: 64.6 % (ref 43–80)
PDW BLD-RTO: 13.8 FL (ref 11.5–15)
PLATELET # BLD: 147 E9/L (ref 130–450)
PMV BLD AUTO: 9.8 FL (ref 7–12)
POTASSIUM REFLEX MAGNESIUM: 3.6 MMOL/L (ref 3.5–5)
RBC # BLD: 4.15 E12/L (ref 3.8–5.8)
SODIUM BLD-SCNC: 140 MMOL/L (ref 132–146)
TOTAL PROTEIN: 6 G/DL (ref 6.4–8.3)
WBC # BLD: 9 E9/L (ref 4.5–11.5)

## 2019-05-02 PROCEDURE — 97166 OT EVAL MOD COMPLEX 45 MIN: CPT

## 2019-05-02 PROCEDURE — 71045 X-RAY EXAM CHEST 1 VIEW: CPT

## 2019-05-02 PROCEDURE — 76376 3D RENDER W/INTRP POSTPROCES: CPT

## 2019-05-02 PROCEDURE — 73200 CT UPPER EXTREMITY W/O DYE: CPT

## 2019-05-02 PROCEDURE — 85025 COMPLETE CBC W/AUTO DIFF WBC: CPT

## 2019-05-02 PROCEDURE — 93010 ELECTROCARDIOGRAM REPORT: CPT | Performed by: INTERNAL MEDICINE

## 2019-05-02 PROCEDURE — 97535 SELF CARE MNGMENT TRAINING: CPT

## 2019-05-02 PROCEDURE — 99232 SBSQ HOSP IP/OBS MODERATE 35: CPT | Performed by: SURGERY

## 2019-05-02 PROCEDURE — 2500000003 HC RX 250 WO HCPCS: Performed by: SURGERY

## 2019-05-02 PROCEDURE — 36415 COLL VENOUS BLD VENIPUNCTURE: CPT

## 2019-05-02 PROCEDURE — 2580000003 HC RX 258: Performed by: SURGERY

## 2019-05-02 PROCEDURE — 6360000002 HC RX W HCPCS: Performed by: STUDENT IN AN ORGANIZED HEALTH CARE EDUCATION/TRAINING PROGRAM

## 2019-05-02 PROCEDURE — 97530 THERAPEUTIC ACTIVITIES: CPT

## 2019-05-02 PROCEDURE — 6370000000 HC RX 637 (ALT 250 FOR IP): Performed by: SURGERY

## 2019-05-02 PROCEDURE — 32551 INSERTION OF CHEST TUBE: CPT

## 2019-05-02 PROCEDURE — 0W9B30Z DRAINAGE OF LEFT PLEURAL CAVITY WITH DRAINAGE DEVICE, PERCUTANEOUS APPROACH: ICD-10-PCS | Performed by: SURGERY

## 2019-05-02 PROCEDURE — 2060000000 HC ICU INTERMEDIATE R&B

## 2019-05-02 PROCEDURE — 80053 COMPREHEN METABOLIC PANEL: CPT

## 2019-05-02 PROCEDURE — 2700000000 HC OXYGEN THERAPY PER DAY

## 2019-05-02 PROCEDURE — 97162 PT EVAL MOD COMPLEX 30 MIN: CPT

## 2019-05-02 PROCEDURE — 6360000002 HC RX W HCPCS: Performed by: SURGERY

## 2019-05-02 RX ORDER — METHOCARBAMOL 500 MG/1
500 TABLET, FILM COATED ORAL 4 TIMES DAILY
Status: DISCONTINUED | OUTPATIENT
Start: 2019-05-02 | End: 2019-05-06 | Stop reason: HOSPADM

## 2019-05-02 RX ORDER — LIDOCAINE HYDROCHLORIDE AND EPINEPHRINE 10; 10 MG/ML; UG/ML
20 INJECTION, SOLUTION INFILTRATION; PERINEURAL ONCE
Status: COMPLETED | OUTPATIENT
Start: 2019-05-02 | End: 2019-05-02

## 2019-05-02 RX ORDER — HEPARIN SODIUM 10000 [USP'U]/ML
5000 INJECTION, SOLUTION INTRAVENOUS; SUBCUTANEOUS EVERY 8 HOURS
Status: DISCONTINUED | OUTPATIENT
Start: 2019-05-02 | End: 2019-05-06 | Stop reason: HOSPADM

## 2019-05-02 RX ORDER — LABETALOL HYDROCHLORIDE 5 MG/ML
10 INJECTION, SOLUTION INTRAVENOUS EVERY 30 MIN PRN
Status: DISCONTINUED | OUTPATIENT
Start: 2019-05-02 | End: 2019-05-06 | Stop reason: HOSPADM

## 2019-05-02 RX ORDER — HYDRALAZINE HYDROCHLORIDE 20 MG/ML
10 INJECTION INTRAMUSCULAR; INTRAVENOUS EVERY 30 MIN PRN
Status: DISCONTINUED | OUTPATIENT
Start: 2019-05-02 | End: 2019-05-06 | Stop reason: HOSPADM

## 2019-05-02 RX ADMIN — HYDRALAZINE HYDROCHLORIDE 10 MG: 20 INJECTION, SOLUTION INTRAMUSCULAR; INTRAVENOUS at 02:33

## 2019-05-02 RX ADMIN — HEPARIN SODIUM 5000 UNITS: 10000 INJECTION INTRAVENOUS; SUBCUTANEOUS at 15:01

## 2019-05-02 RX ADMIN — METHOCARBAMOL TABLETS 500 MG: 500 TABLET, COATED ORAL at 08:40

## 2019-05-02 RX ADMIN — OXYCODONE HYDROCHLORIDE 10 MG: 10 TABLET ORAL at 12:18

## 2019-05-02 RX ADMIN — MORPHINE SULFATE 2 MG: 2 INJECTION, SOLUTION INTRAMUSCULAR; INTRAVENOUS at 03:30

## 2019-05-02 RX ADMIN — Medication 10 ML: at 00:55

## 2019-05-02 RX ADMIN — Medication 10 ML: at 08:38

## 2019-05-02 RX ADMIN — DOCUSATE SODIUM 100 MG: 100 CAPSULE, LIQUID FILLED ORAL at 08:40

## 2019-05-02 RX ADMIN — METHOCARBAMOL TABLETS 500 MG: 500 TABLET, COATED ORAL at 15:01

## 2019-05-02 RX ADMIN — OXYCODONE HYDROCHLORIDE 10 MG: 10 TABLET ORAL at 07:11

## 2019-05-02 RX ADMIN — LIDOCAINE HYDROCHLORIDE,EPINEPHRINE BITARTRATE 20 ML: 10; .01 INJECTION, SOLUTION INFILTRATION; PERINEURAL at 03:51

## 2019-05-02 RX ADMIN — HYDRALAZINE HYDROCHLORIDE 10 MG: 20 INJECTION, SOLUTION INTRAMUSCULAR; INTRAVENOUS at 23:16

## 2019-05-02 RX ADMIN — HEPARIN SODIUM 5000 UNITS: 10000 INJECTION INTRAVENOUS; SUBCUTANEOUS at 08:41

## 2019-05-02 RX ADMIN — HYDRALAZINE HYDROCHLORIDE 10 MG: 20 INJECTION, SOLUTION INTRAMUSCULAR; INTRAVENOUS at 00:54

## 2019-05-02 RX ADMIN — HEPARIN SODIUM 5000 UNITS: 10000 INJECTION INTRAVENOUS; SUBCUTANEOUS at 23:16

## 2019-05-02 RX ADMIN — Medication 10 ML: at 20:49

## 2019-05-02 RX ADMIN — Medication 10 ML: at 02:33

## 2019-05-02 RX ADMIN — Medication 10 ML: at 03:31

## 2019-05-02 RX ADMIN — MORPHINE SULFATE 2 MG: 2 INJECTION, SOLUTION INTRAMUSCULAR; INTRAVENOUS at 08:37

## 2019-05-02 RX ADMIN — MORPHINE SULFATE 2 MG: 2 INJECTION, SOLUTION INTRAMUSCULAR; INTRAVENOUS at 03:51

## 2019-05-02 RX ADMIN — METHOCARBAMOL TABLETS 500 MG: 500 TABLET, COATED ORAL at 18:19

## 2019-05-02 ASSESSMENT — PAIN SCALES - GENERAL
PAINLEVEL_OUTOF10: 6
PAINLEVEL_OUTOF10: 0
PAINLEVEL_OUTOF10: 8
PAINLEVEL_OUTOF10: 0
PAINLEVEL_OUTOF10: 4
PAINLEVEL_OUTOF10: 0
PAINLEVEL_OUTOF10: 9
PAINLEVEL_OUTOF10: 8
PAINLEVEL_OUTOF10: 9
PAINLEVEL_OUTOF10: 0

## 2019-05-02 ASSESSMENT — PAIN DESCRIPTION - PAIN TYPE
TYPE: ACUTE PAIN

## 2019-05-02 ASSESSMENT — PAIN DESCRIPTION - ORIENTATION
ORIENTATION: LEFT

## 2019-05-02 ASSESSMENT — PAIN DESCRIPTION - PROGRESSION
CLINICAL_PROGRESSION: NOT CHANGED
CLINICAL_PROGRESSION: GRADUALLY IMPROVING

## 2019-05-02 ASSESSMENT — PAIN DESCRIPTION - FREQUENCY: FREQUENCY: CONTINUOUS

## 2019-05-02 ASSESSMENT — PAIN DESCRIPTION - LOCATION
LOCATION: RIB CAGE

## 2019-05-02 ASSESSMENT — PAIN DESCRIPTION - DESCRIPTORS
DESCRIPTORS: ACHING;DISCOMFORT;SORE
DESCRIPTORS: ACHING;DISCOMFORT;SHARP
DESCRIPTORS: ACHING;DISCOMFORT;SHARP

## 2019-05-02 ASSESSMENT — PAIN DESCRIPTION - ONSET
ONSET: ON-GOING

## 2019-05-02 ASSESSMENT — PAIN - FUNCTIONAL ASSESSMENT: PAIN_FUNCTIONAL_ASSESSMENT: PREVENTS OR INTERFERES SOME ACTIVE ACTIVITIES AND ADLS

## 2019-05-02 NOTE — PROGRESS NOTES
Department of Orthopedic Surgery  Resident Progress Note    Patient seen and examined. Pain controlled. No new complaints. Denies chest pain, shortness of breath, dizziness/lightheadedness. Patient states he is doing pretty well today.  His shoulder pain is improving    VITALS:  BP (!) 173/89   Pulse 79   Temp 98.1 °F (36.7 °C) (Temporal)   Resp 19   Ht 6' (1.829 m)   Wt 170 lb (77.1 kg)   SpO2 95%   BMI 23.06 kg/m²     General: alert and oriented to person, place and time, well-developed and well-nourished, in no acute distress    MUSCULOSKELETAL:   right upper extremity:  · Sling in place to arm  · Compartments soft and compressible  · +AIN/PIN/Ulnar/Median/Radial nerve function intact grossly  · +2/4 Radial pulse, Cap refill <2 sec  · Sensation intact to touch in radial/ulnar/median nerve distributions to hand    CBC:   Lab Results   Component Value Date    WBC 9.0 05/02/2019    HGB 13.5 05/02/2019    HCT 39.0 05/02/2019     05/02/2019     PT/INR:    Lab Results   Component Value Date    PROTIME 12.0 12/06/2017    PROTIME 11.8 10/27/2010    INR 1.1 12/06/2017       ASSESSMENT  · Left scapular body fracture with extension into glenoid    PLAN      · Continue physical therapy and protocol: PALAK MADSEN  · Deep venous thrombosis prophylaxis per admitting  · PT/OT  · Pain Control: per trauma  · Monitor H&H  · D/C Plan:  Planning  · Patient ok for discharge from orthopedic standpoint  · May follow-up in office with Dr. Enrico Delgadillo  · Discussed with attending

## 2019-05-02 NOTE — PROGRESS NOTES
Trauma Tertiary Survey    Admit Date: 5/1/2019    [facility]    CC:    Chief Complaint   Patient presents with   Nikolai  Fall     [facility] side pneumo chest tube in place upon arrival no complaints upon arrival        Alcohol pre-screening:  How many times in the past year have you had 5 or more drinks in a day?  none    Subjective:     Patient feels well complains of shoulder pain and chest pain. Objective:     Patient Vitals for the past 8 hrs:   BP Temp Temp src Pulse Resp SpO2 Height Weight   05/02/19 0311 -- -- -- -- -- 93 % -- --   05/02/19 0308 -- -- -- -- -- 92 % -- --   05/02/19 0226 (!) 168/70 -- -- -- -- 92 % -- --   05/01/19 2332 (!) 178/82 98.5 °F (36.9 °C) Oral 65 18 96 % 6' (1.829 m) 170 lb (77.1 kg)   05/01/19 2305 132/70 98 °F (36.7 °C) Oral 61 18 96 % 6' (1.829 m) 170 lb (77.1 kg)   05/01/19 2020 (!) 137/59 97.8 °F (36.6 °C) Temporal 68 16 94 % 6' (1.829 m) 175 lb (79.4 kg)       No intake/output data recorded. No intake/output data recorded. Radiology:  XR CHEST PORTABLE   Final Result   1. Further progression of left-sided pneumothorax presently of about   25%. 2. Left-sided chest tube has been placed, tip is located superiorly   and medially in the left chest cavity. Preliminary report given to Dr. Beto Minor, ER physician at the time of the   interpretation. ALERT:  ABNORMAL REPORT.             XR HAND RIGHT (MIN 3 VIEWS)    (Results Pending)   CT SHOULDER LEFT WO CONTRAST    (Results Pending)   CT 3D RECONSTRUCTION    (Results Pending)   XR CHEST PORTABLE    (Results Pending)   XR CHEST PORTABLE    (Results Pending)   XR CHEST PORTABLE    (Results Pending)       PHYSICAL EXAM:   GCS:  4 - Opens eyes on own   6 - Follows simple motor commands  5 - Alert and oriented    Pupil size: Left 4 mm     Right 4 mm  Pupil reaction: Yes  Wiggles fingers: Left Yes     Right Yes  Wiggles toes: Left Yes     Right Yes  Plantar flexion: Left normal     Right normal    PHYSICAL EXAM  General: No apparent distress, comfortable  HEENT: Trachea midline, no masses, Pupils equal round  Chest: Respiratory effort was normal with no retractions, left sided chest tube in place, left sided rib fx. Cardiovascular:RR  Abdomen:  Soft and non distended. No tenderness, guarding, rebound, or rigidity  Extremities: Moves all 4 extremeties, decreased ROM in left shoulder due to fx No pedal edema. Brusing to right hand no tenderness. Spine:       Spine Tenderness ROM   Cervical 0 /10 Normal   Thoracic 0 /10 Normal   Lumbar 0 /10 Normal     Musculoskeletal:    Joint Tenderness Swelling/Deformity ROM   Right shoulder absent absent normal   Left shoulder Present present NA   Right elbow absent absent normal   Left elbow absent absent normal   Right wrist absent absent normal   Left wrist absent absent normal   Right hand grasp absent absent normal   Left hand grasp absent absent normal   Right hip absent absent normal   Left hip absent absent normal   Right knee absent absent normal   Left knee absent absent normal   Right ankle absent absent normal   Left ankle absent absent normal   Right foot absent absent normal   Left foot absent absent normal         CONSULTS: Ortho      Active Problems:    Trauma  Resolved Problems:    * No resolved hospital problems.  *        Assessment/Plan:       Neuro: GCS 15, pain control  CV: regular rate, no acute issues  Pulm: , left sided rib fxs, encourage IS, SMI 1750  GI: regular diet, bowel regimen  Renal: Creatinine within normal limits, no edema indicating fluid overload  ID: afebrile  Endo: glucose near normal range, no acute issues  MSK: left scapula fx, ortho following NWB  Heme: no acute issues, SCDs, lovenox      Code status:  Full Code    Disposition:  PT/OT    Electronically signed by Albert Reed MD on 5/2/2019 at 4:12 AM

## 2019-05-02 NOTE — PROGRESS NOTES
Occupational Therapy  OCCUPATIONAL THERAPY INITIAL EVALUATION      Date:2019  Patient Name: Cornelio Kemp  MRN: [id number]  : 1922  Room: [address] [place] [address]    Evaluating OT:  JEVON [place], OTR/L  # [id number]      AM-PAC Daily Activity Raw Score:    Recommended Adaptive Equipment:  TBD as pt progresses    Reason for Admission:  Pt was admitted after mechanical fall ~ 1 day prior to presentation to ER. C/o L UE and Left Rib pain. (+) Multiple Fractures and Pneumothorax    Diagnosis:  Trauma   Displaced scapular body fracture that extending into the Glenoid Fracture  Fracture of the proximal humerus at the surgical neck with valgus impaction  Left Sided 7th Rib Fracture  Left Sided Pneumothorax, Collapse of Left Lower Lung    Procedures this admission:  Chest Tube Placement 19     Pertinent Medical History:  HTN, Hernia Repair, ORIF R Hip     Precautions:  Falls  NWB L UE - Sling for Comfort    Home Living: Pt lives alone in a single-level condo with 0 [address]. No basement. Bathroom setup:  Tub-Shower, Walk-in Shower, standard commode   Equipment owned:  [facility], Shower Chair    Available Family Assist:  Family members live locally - uncertain of availability to provide / assist    Prior Level of Function:  IND with ADLs, IADLs, Transfers and Mobility using SPC occasionally for ambulation.    Driving:  Yes  Occupation:  None stated;  Visits his \"lady friend\" at [address] [place] [address] daily, Attends [employer] regularly    Pain Level:  6/10 at rest, increased to 7/10 w/ Ax;  Nsg Notified   Additional Complaints:  Slight Dizziness w/ upright ax    Vitals/Lab Values:  Unable to obtain reading for O2 sats    Cognition: A & O x 4   Able to Follow Multi-Step Commands w/ Occasional Min VCs - very talkative and easily distracted - requires cues to attend to task   Memory:  good    Sequencing:  good    Problem solving:  good    Judgement/safety:  fair   Additional Comments:  Very impulsive, quick moving, Limited insight into the extent of his injuries. Required Mod-Max VCs to adhere to precautions for safety       Functional Assessment:   Initial Eval Status  Date: 5-2-19 Treatment Status  Date: Short Term Goals  Treatment frequency: PRN 2-4 x/week   Feeding Min A/Set up    Able to feed self w/ utensils/drink from cup w/ R UE, Required Assist to cut food, open containers d/t precautions for limited ROM/use of L UE  Mod I   Grooming Min A/Set up    Required Min A/Set up to complete simple grooming tasks while seated EOB, unsafe to stand at sink d/t limited endurance/dizziness /impulsiveness  Close SUP  Standing At The Sink   UB Dressing Max A/Set up    Required Max A to don/doff hospital gown, sling w/ Mod-Max VCs for safe tech seated EOB  Mod A/Set up   LB Dressing DEP    Max A to don/doff socks/slippers/thread feet into pants seated EOB, Mod A of 1 for standing balance + Mod A of 1 for clothing adjustment   Max A   Bathing NT      Mod A   Toileting DEP    Mod A of 1 for standing balance, Mod A of 1 for clothing adjustment, able to complete own hygiene  Mod A   Bed Mobility  Rolling: Mod A  Repositioning:  Min A/Mod VCs   Supine to Sit:  NT    Sit to Supine:  Min A/Mod VCs    Pt ed for log-rolling tech   Min A   Functional Transfers Sit <> stand:  DEP   Mod A of 1-2 for safety/line mgmt     From EOB 2x, Commode 1x, pt ed for safety/hand placement  Mod A   Functional Mobility DEP    Mod A of 2 for safety and line mgmt ~ 15 mins + 10' w/ seated rest break b/t trials, pt ed/Max VCs for safety  Mod A   Balance Sitting:  Close SUP     Static:  Good (-) - Close SUP at EOB    Dynamic:  Fair(+) w/ at EOB/commode    Standing:   Mod A of 2    Static:  Fair (+) - Min A of 2    Dynamic:  Mod A of 2     Activity Tolerance Tolerated Sitting:  EOB > 15 mins w/ ax, Commode ~ 10 mins w/ ax  Tolerated Standing:  ~ 3 mins + 2 mins     Visual/  Perceptual WFL  Glasses:  Yes      Hearing WFL  Hearing Aids  No       Hand dominance: RIGHT    UE ROM: RUE:  WFL      LUE: distally WFL, NT Proximally d/t fractures    Strength: RUE: grossly 4+/5 - observed d/t injuries/pain     LUE: nt d/t multiple fractures     Strength:  WFL Ravi UEs    Fine Motor Coordination:  WFL Ravi UEs    Sensation:  Denies numbness or tingling Ravi UEs  Tone:  WFL Ravi UEs  Edema:  None Noted                            Treatment:       -- Education:  Provided Pt/Family ed re: Benefits/Purpose of OT services;  OT Plan of Care;  Transfer Safety; Benefits of use of DME/AD/Adaptive equip/techs to increase safety/IND with Functional Ax; Techs to increase Safety/Safety Awareness w/ Functional Ax; Energy Conservation Techs/Pursed-Lip Breathing;  Benefits of Cont'd Participation in OT services at D/C; Instructed on Importance of immobility of L UE, use of Sling for comfort/safety and NWB status L UE;  Extent of injuries and importance of increasing safety w/ ax to maintain lines - chest tube/O2, Etc     Pt and/or Family verbalized/demonstrated a Fair understanding of education provided. Will Review PRN. Provided Skilled SUP/Assist w/ Pt safety, Proper Positioning, ADLs, Transfers and Functional Mobility as noted above, as well as set up and clean up for session. Skilled monitoring of Vitals and pts response to treatment. Consulted PT, RN, SW     [] Malnutrition indicators have been identified and nursing has been notified to ensure a dietitian consult is ordered. Comments/Treatment:  Upon arrival, pt was found seated EOB w/ RN providing nsg care. He was agreeable to participate in assessment ax. No family members present during assessment. Received permission from RN prior to engaging pt in assessment ax. At the end of the session, patient was properly positioned in semi-supine w/ L UE supported in sling and supported by pillow, call light and phone within reach, all lines and tubes intact. Oriented pt to call bell. Bed Alarm activated.   Made all appropriate

## 2019-05-02 NOTE — ED NOTES
Patient refuses any further images tonight. He states that he has had too many images today and he feels that he just wants to be admitted and maybe tomorrow he will have the images. He also states that he \"just wants to be admitted right now so he can get to another room. Dr Osman Nunez notified.      Isabelle Beasley, RN  05/01/19 [id number]

## 2019-05-02 NOTE — PROGRESS NOTES
Physical Therapy    Facility/Department: [address] PICU  Initial Assessment    NAME: Stas Billy  : 1922  MRN: [id number]    Date of Service: 2019    Evaluating Therapist: Andrzej Small PT, DPT    Room #: 2455D  DIAGNOSIS: Trauma  PRECAUTIONS: Falls, O2, L chest tube, NWB LUE, sling for comfort LUE  S/p fall at home with comminuted L scapula fx, L humeral head fx, L lateral 7th rib fx, L pneumothorax    Social:  Pt lives alone in a 1 floor plan condo with no step(s) and no rail(s) to enter. Prior to admission pt walked with a Hurrycane. Reported independent with ADLs/IADLs. Actively driving. Initial Evaluation  Date: 19 Treatment  Date: NA    Short Term/ Long Term   Goals   Was pt agreeable to Eval/treatment? Yes      Does pt have pain? Reported 7/10 in L rib/CT site. Nurse notified of patient needs. Bed Mobility  Rolling: Min S  Supine to sit: Min A  Sit to supine: Mind A  Scooting: Min A  SBA   Transfers Sit to stand: Mod A +1-2  Stand to sit: Mod A +1-2  Stand pivot: Mod A +1-2  Min A   Ambulation    15 feet, 8 feet with no AD with Mod A +2  >50 feet with AAD with Min A   Stair negotiation: ascended and descended  NT  4 steps with 1 rail with Min A   AM-PAC Score 13/24       Pt is alert and Oriented x 3, however, poor insight to injuries and functional deficits. Patient impulsive and required frequent cues for safety and focusing on task at hand. BLE ROM is WFL. BLE strength is grossly 4/5. Balance: Seated: Supervision; Standing: Mod/Max A   Sensation: Denied N/T  Edema: None noted  Endurance: Poor  Bed/Chair alarm: Yes     ASSESSMENT  Pt displays functional ability as noted in the objective portion of this evaluation. Comments/Treatment:  Patient cleared by nurse and agreeable to assessment. Patient found seated EOB with RN in room as CT site bandage required attention.  Patient with sling around his neck, but did not have his LUE in the sling and was repeatedly attempting to number:  [id number]

## 2019-05-02 NOTE — PLAN OF CARE
Problem: Pain:  Goal: Pain level will decrease  Description  Pain level will decrease  Outcome: Met This Shift  Goal: Control of acute pain  Description  Control of acute pain  Outcome: Met This Shift

## 2019-05-02 NOTE — H&P
TRAUMA HISTORY & PHYSICAL  Resident   5/1/2019  10:20 PM    Chief Complaint   Patient presents with    Fall     transfer from [address] [place] [address] fell left side pneumo chest tube in place upon arrival no complaints upon arrival          HPI: Mitesh Hogan is a 80 y.o. male who presents after a standing height mechanical fall yesterday. He tripped over a rug. Denies hitting his head, denies LOC. He complains of left shoulder pain, and chest pain. At [facility] he was found to have a left sided pneumothorax with left sided 7th rib fxand had a chest tube placed. He denies other complaints. Takes ASA denies other [facility].     PRIMARY SURVEY    AIRWAY:   Airway normal  EMS ETT Absent   Noisy respirations Absent   Retractions: Absent   Vomiting/bleeding: Absent     BREATHING:    Midaxillary breath sound left:  Present  Midaxillary breath sound right: Present  Cough sound intensity:  Fair  Respiratory rate: 18  FiO2:RA  SpO2: 7  SMI: 1750    CIRCULATION:   Femerol pulse rate: within normal limits  Femerol pulse intensity: present  Palpebral conjunctiva: Pink     BP      P     SpO2       T      F    Patient Vitals for the past 8 hrs:   BP Temp Temp src Pulse Resp SpO2 Height Weight   05/01/19 2020 (!) 137/59 97.8 °F (36.6 °C) Temporal 68 16 94 % 6' (1.829 m) 175 lb (79.4 kg)       FAST EXAM: Not performed    Central Nervous System    GCS Initial 15 minutes   Eye  Motor  Verbal 4 - Opens eyes on own  6 - Follows simple motor commands  5 - Alert and oriented 4 - Opens eyes on own  6 - Follows simple motor commands  5 - Alert and oriented     Neuromuscular blockade: No  Pupil size:  Left 3 mm    Right 3 mm  Pupil reaction: Yes  Wiggles fingers: Left Yes Right Yes  Wiggles toes: Left Yes    Right Yes    Hand grasp:   Left normal       Right normal  Plantar flexion: Left normal     Right normal  Loss of consciousness: No:     History Obtained From:  patient  Private Medical Doctor: Love Sorto MD    Pre-exisiting Medical History: tissue injuries   Tenderness:  none    Pelvis: Stable, no soft tissue injuries, no pain with hip flexion   Tenderness: none    Thoracolumbar spine: No soft tissue injuries, no step-offs  Tenderness:  none    Extremities: right hand ecchymosis   Sensory normal  Motor normal    Distal Pulses  Left arm Normal  Right arm Normal  Left leg Normal  Right leg Normal    Capillary refill   Left arm normal  Right arm normal  Left leg normal   Right leg normal      Procedures in ED:  CT placed at [place]     Lacerations sutured by:   Description of repair:     Radiology:     Xr Ribs Left Include Chest (min 3 Views)    Result Date: 2019  Patient MRN: [id number] : 1922 Age:  80 years Gender: Male Order Date: 2019 3:30 PM. Exam: XR RIBS LEFT INCLUDE CHEST (MIN 3 VIEWS) Number of Views: 5 Indication:  Left shoulder rib and arm pain Comparison: Radiographs 3/3/2019 Findings: Stable, enlarged cardiomediastinal silhouette with an abnormal masslike density projecting along the left cardiac border measured approximately 3.1 x 3.3 cm, not identified on the previous exam. Vascular calcifications thoracic aorta. Osteopenia. Age-indeterminate fracture of the left proximal humerus. Comminuted fracture of the left scapula. . Vascular calcifications thoracic aorta. No pneumothorax. No definitive displaced fracture of the left ribs is seen on limited fracture evaluation. ALERT:  THIS IS AN ABNORMAL REPORT 1. Comminuted left scapular fracture. 2. Age-indeterminate fracture left proximal humerus. 3. Nodular abnormality or mass projecting along the left cardiac border not seen on previous exam. Findings are indeterminate and could reflect pulmonary mass. The possibility of a cardiac aneurysm is also not excluded. Further evaluation with CT scan of the chest is recommended.  4. No definitive displaced rib fracture is noted, this can be further evaluated with the above recommended CT scan of the chest.    Xr Humerus Left (min 2 Views)    Result Date: 2019  Patient MRN: [id number] : 1922 Age:  80 years Gender: Male Order Date: 2019 3:30 PM Exam: XR HUMERUS LEFT (MIN 2 VIEWS) Number of Images: 4 views Indication:   Pain Pain Comparison: None. Findings: There is left humeral neck fracture which is displaced superiorly. There is severe osteopenia of the osseous structure. There is a horizontal fracture of the scapula extending to the glenoid fossa. . The shoulder joint and elbow joint appear to be normal.     Left humeral neck fracture which is displaced superiorly Left scapula fracture extending to the glenoid fossa. Severe osteopenia. ALERT:  THIS IS AN ABNORMAL REPORT    Ct Head Wo Contrast    Result Date: 2019  Clinical indications: Pain status post trauma. Cranial trauma. COMPARISON: 2019. Exposure control: This examination and all examinations utilizing ionizing radiation at this facility done so according to the ALARA (as low as reasonably achievable) principal for radiation dose reduction. Technique: Axial, sagittal and coronal computed tomography of the brain and calvarium was performed without contrast. FINDINGS: There is no evidence for acute intracranial hemorrhage, midline shift or mass effect. There is enlargement of the ventricles, sulci and cisterns bilaterally. There is patchy hypoattenuation in the periventricular deep white matter bilaterally. There are calcifications within the carotid siphons and vertebrobasilar arterial systems. Mucosal thickening within the paranasal sinuses but no fluid levels are evident. Otherwise the brain parenchyma, CSF spaces, paranasal sinuses and mastoid air cells and surrounding soft tissue and osseous structures have a satisfactory appearance. The gray-white matter junction is otherwise preserved. The cerebellopontine angle and cisterns are unremarkable. The bony calvarium is negative for acute fracture or aggressive process. The alexi and brainstem are unremarkable. No evidence for acute intracranial process. Cortical atrophy and chronic periventricular microangiopathy. Ct Chest Wo Contrast    Result Date: 2019  Patient Name:  Delgado Baptiste Patient MRN:  [id number] Patient :  1922 Patient Age:  80 years Patient Gender:  Male Order Date:2019 4:45 PM EXAM:  CT CHEST WO CONTRAST NUMBER OF IMAGES:  [id number] INDICATION:  Trauma yesterday COMPARISON: Chest and left rib images today 1601 hours, prior chest radiograph March 3, 2019 TECHNIQUE:  Axial images were obtained from the apices of the lungs through the upper abdomen without contrast administration. Sagittal and coronal reconstructions performed for aid in interpretation of the study. Low-dose CT  acquisition technique included one of following options; 1 . Automated exposure control, 2. Adjustment of MA and or KV according to patient's size or 3. Use of iterative reconstruction. FINDINGS: Lung window images: Lung window images show left-sided pneumothorax estimated at 20% left hemithoracic volume. There is no evidence of a tension component. There is contusion or atelectasis involving the collapsed left lower lung. There is no evidence of a right-sided traumatic finding. Soft tissue window images:  Soft tissue window images show coronary artery calcifications and aortic calcification as well as ectasia of the ascending thoracic aorta with perpendicular diameter measurements of 5.5 x 5.1 cm. Upper abdomen: No acute upper abdominal traumatic findings are specifically identified. Gallstones and a left lobe hepatic cyst are incidentally noted. No evidence of acute renal trauma is identified. Skeletal: In addition of the scapular fracture, there is a nondisplaced lateral left seventh rib fracture. Advanced Degenerative spinal findings are noted. 1. Left-sided pneumothorax of at least 20% left hemithoracic volume. There is collapse of the left lower lung with density that could represent contusion or atelectasis. 5/1/2019 7:45 PM Exam: XR CHEST 1 VIEW Number of Images: 1 view Indication:   chest tube chest tube Comparison: CT scan chest 5/1/2019 Findings: Chest tube is present. There is a pneumothorax seen . This is unchanged from the previous CT scan. The heart is enlarged. Lung fields demonstrate patchy bilateral infiltrates which could be related to pulmonary vascular congestion. The aorta is tortuous and ectatic Left scapular fractures again seen. Rib fracture is not as well-seen as on the CT scan    Cardiomegaly Tortuous aorta There are patchy infiltrates seen throughout both the lung fields. Pulmonary vascular congestion could have this appearance Left pneumothorax, with a left chest tube. Pneumothorax is unchanged from previous ALERT:  THIS IS AN ABNORMAL REPORT           Consultations:  Ortho    Admission/Diagnosis:   80 y.o. male s/p standing height mechanical fall with left sided pneumothorax, humeral head fx, rib fx and scapula fx. Code Status: full    Plan of Treatment:  Place CT to continuous suction   Repeat CXR and place larger CT if PNX worsens or respiratory status worsens. IS  PEP flutter  Pain control  Ortho recs    Plan discussed with Dr. Audi Doss.     Lonny James on 5/1/2019 at 10:20 PM

## 2019-05-02 NOTE — ED PROVIDER NOTES
Department of Emergency Medicine   ED  Provider Note  Admit Date/RoomTime: 5/1/2019  8:14 PM  ED Room: 4503/4503-B      History of Present Illness:  5/1/19, Time: 8:43 PM         Travon Elena is a 80 y.o. male presenting to the ED for a fall, beginning today. The complaint has been constant, moderate in severity, and worsened by nothing. Patient arrived in the ED as a transfer from [facility] for a left sided pneumothorax. Pt denies cough, congestion, fever, chills, N/V/D, HA, CP, abdominal pain, back pain, neck pain, LOC, syncope, urinary symptoms, constipation, or any other symptoms. Review of Systems:   Pertinent positives and negatives are stated within HPI, all other systems reviewed and are negative.    --------------------------------------------- PAST HISTORY ---------------------------------------------  Past Medical History:  has a past medical history of Hypertension. Past Surgical History:  has a past surgical history that includes hernia repair and hip surgery (Right, 12/07/2017). Social History:  reports that he quit smoking about 74 years ago. He quit after 2.00 years of use. He has never used smokeless tobacco. He reports that he drinks alcohol. He reports that he does not use drugs. Family History: family history is not on file. The patients home medications have been reviewed. Allergies: Patient has no known allergies. ---------------------------------------------------PHYSICAL EXAM--------------------------------------    Constitutional/General: Alert and oriented x3, well appearing, non toxic in NAD  Head: Normocephalic and atraumatic  Eyes: PERRL, EOMI. Mouth: Oropharynx clear, mucous membranes moist.   Neck: Supple. Full ROM. Respiratory: Lungs clear to auscultation bilaterally, no wheezes, rales, or rhonchi. Not in respiratory distress. Chest tube in place  Cardiovascular:  Regular rate. Regular rhythm. No murmurs, gallops, or rubs.  2+ distal pulses  GI:  Abdomen Soft, Non tender, Non distended. No rebound, guarding, or rigidity. Musculoskeletal: Moves all extremities x 4. Warm and well perfused. Integument: skin warm and dry. No rashes. Neurologic: GCS 15, 5/5 strength.   -------------------------------------------------- RESULTS -------------------------------------------------  I have personally reviewed all laboratory and imaging results for this patient. Results are listed below.      LABS:  Results for orders placed or performed during the hospital encounter of 05/01/19   Comprehensive Metabolic Panel w/ Reflex to MG   Result Value Ref Range    Sodium 140 132 - 146 mmol/L    Potassium reflex Magnesium 3.6 3.5 - 5.0 mmol/L    Chloride 106 98 - 107 mmol/L    CO2 22 22 - 29 mmol/L    Anion Gap 12 7 - 16 mmol/L    Glucose 153 (H) 74 - 99 mg/dL    BUN 20 8 - 23 mg/dL    CREATININE 0.6 (L) 0.7 - 1.2 mg/dL    GFR Non-African American >60 >=60 mL/min/1.73    GFR African American >60     Calcium 8.6 8.6 - 10.2 mg/dL    Total Protein 6.0 (L) 6.4 - 8.3 g/dL    Alb 3.5 3.5 - 5.2 g/dL    Total Bilirubin 0.6 0.0 - 1.2 mg/dL    Alkaline Phosphatase 78 40 - 129 U/L    ALT 14 0 - 40 U/L    AST 20 0 - 39 U/L   CBC auto differential   Result Value Ref Range    WBC 9.0 4.5 - 11.5 E9/L    RBC 4.15 3.80 - 5.80 E12/L    Hemoglobin 13.5 12.5 - 16.5 g/dL    Hematocrit 39.0 37.0 - 54.0 %    MCV 94.0 80.0 - 99.9 fL    MCH 32.5 26.0 - 35.0 pg    MCHC 34.6 (H) 32.0 - 34.5 %    RDW 13.8 11.5 - 15.0 fL    Platelets 025 843 - 702 E9/L    MPV 9.8 7.0 - 12.0 fL    Neutrophils % 64.6 43.0 - 80.0 %    Immature Granulocytes % 0.4 0.0 - 5.0 %    Lymphocytes % 25.3 20.0 - 42.0 %    Monocytes % 8.4 2.0 - 12.0 %    Eosinophils % 1.2 0.0 - 6.0 %    Basophils % 0.1 0.0 - 2.0 %    Neutrophils # 5.80 1.80 - 7.30 E9/L    Immature Granulocytes # 0.04 E9/L    Lymphocytes # 2.28 1.50 - 4.00 E9/L    Monocytes # 0.76 0.10 - 0.95 E9/L    Eosinophils # 0.11 0.05 - 0.50 E9/L    Basophils # 0.01 0.00 - 0.20 E9/L       RADIOLOGY:  Interpreted by Radiologist.  CT SHOULDER LEFT WO CONTRAST   Final Result   ALERT:  THIS IS AN ABNORMAL REPORT. Findings communicated   directly with Dr. Abdi Lopez on 5/2/2019 4:10 PM .    1. Comminuted intra-articular fracture of the left scapula. 2. Suspected remote fracture proximal left humerus, thought to be   partially visualized on chest x-ray 5/9/2017, clinical correlation   with patient's past medical history is recommended. 3. There may be an additional area of superimposed minimally displaced   fracture along the anterior superior aspect of the left humerus with   cortical lucency noted. 4. Left pneumothorax with pleural separation of approximately 1.2 cm.   5. Incomplete visualization of a large descending thoracic aortic   aneurysm measuring at least 5.2 cm anterior posterior and extends off   the field-of-view measuring greater than 4.4 cm transverse. 6. Ascending thoracic aortic aneurysm measured at approximately 3.6 x   3.6 cm with thoracic aortic vascular calcifications. CT 3D RECONSTRUCTION   Final Result   ALERT:  THIS IS AN ABNORMAL REPORT   1. Comminuted, intra-articular fracture of the left scapula. Suspected   remote fracture left proximal humerus again noted. Please see CT   cervical report for further details. XR CHEST PORTABLE   Final Result   ALERT:  THIS IS AN ABNORMAL REPORT   1. Diminished size of the previously identified left pneumothorax   status post left chest tube placement with a persistent left apical   pneumothorax noted. 2. Stable, enlarged cardiac mediastinal silhouette with thoracic   aortic vascular calcifications. 3. Left basilar airspace disease suggestive of infiltrate/pneumonia   and/or atelectasis with left pleural effusion.       XR CHEST PORTABLE   Final Result   ALERT:  THIS IS AN ABNORMAL REPORT   1. Left-sided pneumothorax with pleural separation of 1.9 cm, stable   when compared with the examination of mL (has no administration in time range)   ondansetron (ZOFRAN) injection 4 mg (has no administration in time range)   senna (SENOKOT) tablet 8.6 mg (has no administration in time range)   hydrALAZINE (APRESOLINE) injection 10 mg (10 mg Intravenous Given 5/2/19 0233)   labetalol (NORMODYNE;TRANDATE) injection 10 mg (has no administration in time range)   methocarbamol (ROBAXIN) tablet 500 mg (500 mg Oral Not Given 5/2/19 2049)   heparin (porcine) injection 5,000 Units (5,000 Units Subcutaneous Given 5/2/19 1501)   lidocaine-EPINEPHrine 1 percent-[id number] injection 20 mL (20 mLs Intradermal Given 5/2/19 0351)       Medical Decision Making:    Patient arrived in the ED for a fall. Patient arrived as a transfer from [address] [place] [address] for a pneumothorax and received a chest tube at outside facility. Still has pneumo but no dyspnea or desaturation. Discussed with Trauma surgery who will guide disposition. Re-Evaluations:           Re-evaluation. Patients symptoms are improving    Consultations:         Trauma Surgery        Counseling: The emergency provider has spoken with the patient and family member patient and discussed todays results, in addition to providing specific details for the plan of care and counseling regarding the diagnosis and prognosis. Questions are answered at this time and they are agreeable with the plan.     --------------------------------- IMPRESSION AND DISPOSITION ---------------------------------    IMPRESSION  1. Trauma        DISPOSITION  Disposition: per Trauma  Patient condition is stable    5/1/19, 8:43 PM.    This note is prepared by Rae Lugo, acting as Scribe for Bobby Mcknight MD.    Bobby Mcknight MD:  The scribe's documentation has been prepared under my direction and personally reviewed by me in its entirety. I confirm that the note above accurately reflects all work, treatment, procedures, and medical decision making performed by me.            Bobby Mcknight MD  05/02/19 0180

## 2019-05-02 NOTE — CARE COORDINATION
Patient from home alone. His son checks on his regularly. He typically walks with a cane. He was at home ambulatory with a cane after his fall a full day before he came to the hospital. He plans to go home at discharge. Currently he is wearing oxygen but does not wear it at home. His PCP is Dr. Karina Cagle. Discussed possible homecare and patient declined at this point. Patient has a girlfriend that is long term care at [address] [place] [address]. Patient tells me if he needed rehab he would want to go there. Plan at this point is home with family support.

## 2019-05-02 NOTE — PROGRESS NOTES
[facility]  ATTENDING PHYSICIAN PROGRESS NOTE     I have examined the patient and  reviewed the record. I have reviewed all relevant labs and imaging data. The following summarizes my clinical findings and independent assessment. CC: fall    S. Pt had left chest tube replaced this morning. Complains of left chest wall pain    O.  Vitals:    05/02/19 0830   BP: (!) 173/89   Pulse: 79   Resp: 19   Temp: 98.1 °F (36.7 °C)   SpO2: 95%     PHYSICAL EXAM   PSYCH: mood and affect normal, alert and oriented x 3  CONSTITUTIONAL: No apparent distress, comfortable  EYES: Sclera white, pupils equal round and reactive to light  ENMT:  Hearing normal, trachea midline, ears externally intact  RESP: Breath sounds were clear and equal with no rales, wheezes, or rhonchi. Respiratory effort was normal with no retractions or use of accessory muscles. Left chest wall tender/ Left chest tube no leak  CV: Heart sounds were normal with a regular rate and rhythm. No pedal edema  GI/ Abdomen: The abdomen was soft and non distended. There was no tenderness, guarding, rebound, or rigidity. There was no                     masses, hepatosplenomegaly, or hernias. ASSESSMENT:  Active Problems:    Trauma    Closed fracture of multiple ribs of left side    Hemopneumothorax on left    Closed fracture of left scapula  Resolved Problems:    * No resolved hospital problems.  *       PLAN:  Left hemopneumothorax--continue left chest tube to suction  Multiple rib fx--pain control/ pulmonary toilet  General diet  Left scapula fx--NWB LUE/   DVT Proph: SCDS/ heparin tid       Vernel Hatchet, MD, FACS  5/2/2019  3:10 PM

## 2019-05-02 NOTE — ED NOTES
Bed: 17A-17  Expected date:   Expected time:   Means of arrival:   Comments:  ems     Edgardo Pathak RN  05/01/19 2014

## 2019-05-02 NOTE — PROCEDURES
Allyssa Green is a 80 y.o. male patient. No diagnosis found. Past Medical History:   Diagnosis Date    Hypertension      Blood pressure (!) 168/70, pulse 65, temperature 98.5 °F (36.9 °C), temperature source Oral, resp. rate 18, height 6' (1.829 m), weight 170 lb (77.1 kg), SpO2 93 %. Chest Tube Insertion  Date/Time: 5/2/2019 3:58 AM  Performed by: Randi Felty, MD  Authorized by: Randi Felty, MD   Consent: Verbal consent obtained.   Consent given by: patient  Indications: pneumothorax    Sedation:  Patient sedated: no    Anesthesia: local infiltration    Anesthesia:  Local Anesthetic: lidocaine 1% with epinephrine  Preparation: skin prepped with Betadine  Tube size: 28 [place]  Dissection instrument: Lila clamp and finger  Ultrasound guidance: no  Tension pneumothorax heard: no  Tube connected to: suction  Drainage characteristics: bloody  Suture material: 2-0 silk  Dressing: 4x4 sterile gauze and Xeroform gauze  Post-insertion x-ray findings: tube in good position  Patient tolerance: Patient tolerated the procedure well with no immediate complications          Randi Felty, MD  5/2/2019

## 2019-05-02 NOTE — DISCHARGE SUMMARY
Physician Discharge Summary     Patient ID:  Katie Monet  [id number]  2 y.o.  1922    Admit date: 2019    Discharge date and time: 2019  1:50 PM     Admitting Physician: Veronica Collins MD     Admission Diagnoses: Trauma [T14.90XA]    Discharge Diagnoses: Active Problems:    Trauma    Closed fracture of multiple ribs of left side    Hemopneumothorax on left    Closed fracture of left scapula  Resolved Problems:    * No resolved hospital problems. *      Admission Condition: fair    Discharged Condition: stable    Indication for Admission: [address] Course/Procedures/Operation/treatments:   - admitted after a standing height fall from . Was found to have a left scapula fx, left pneumothorax and left sided rib fx. CT placed at [facility]. - CT pulled out overnight and replaced with 28 fr CT. Ortho recommended NWB to LUE.   5/3: Pain controlled. Tolerating diet. No airleak on CT. Awaiting daily CXR and possibly change to water seal  : chest tube removed  : Awaiting PT/OT, placement to AR. Discharged to ARU. Consults:   IP CONSULT TO TRAUMA SURGERY  IP CONSULT TO SOCIAL WORK  IP CONSULT TO PHYSICAL MEDICINE REHAB  IP CONSULT TO PHYSICAL MEDICINE REHAB    Significant Diagnostic Studies:   Xr Ribs Left Include Chest (min 3 Views)    Result Date: 2019  Patient MRN: [id number] : 1922 Age:  80 years Gender: Male Order Date: 2019 3:30 PM. Exam: XR RIBS LEFT INCLUDE CHEST (MIN 3 VIEWS) Number of Views: 5 Indication:  Left shoulder rib and arm pain Comparison: Radiographs 3/3/2019 Findings: Stable, enlarged cardiomediastinal silhouette with an abnormal masslike density projecting along the left cardiac border measured approximately 3.1 x 3.3 cm, not identified on the previous exam. Vascular calcifications thoracic aorta. Osteopenia. Age-indeterminate fracture of the left proximal humerus. Comminuted fracture of the left scapula. . Vascular calcifications thoracic aorta. cisterns bilaterally. There is patchy hypoattenuation in the periventricular deep white matter bilaterally. There are calcifications within the carotid siphons and vertebrobasilar arterial systems. Mucosal thickening within the paranasal sinuses but no fluid levels are evident. Otherwise the brain parenchyma, CSF spaces, paranasal sinuses and mastoid air cells and surrounding soft tissue and osseous structures have a satisfactory appearance. The gray-white matter junction is otherwise preserved. The cerebellopontine angle and cisterns are unremarkable. The bony calvarium is negative for acute fracture or aggressive process. The alexi and brainstem are unremarkable. No evidence for acute intracranial process. Cortical atrophy and chronic periventricular microangiopathy. Ct Chest Wo Contrast    Result Date: 2019  Patient Name:  Tray Garcia Patient MRN:  [id number] Patient :  1922 Patient Age:  80 years Patient Gender:  Male Order Date:2019 4:45 PM EXAM:  CT CHEST WO CONTRAST NUMBER OF IMAGES:  [id number] INDICATION:  Trauma yesterday COMPARISON: Chest and left rib images today 1601 hours, prior chest radiograph March 3, 2019 TECHNIQUE:  Axial images were obtained from the apices of the lungs through the upper abdomen without contrast administration. Sagittal and coronal reconstructions performed for aid in interpretation of the study. Low-dose CT  acquisition technique included one of following options; 1 . Automated exposure control, 2. Adjustment of MA and or KV according to patient's size or 3. Use of iterative reconstruction. FINDINGS: Lung window images: Lung window images show left-sided pneumothorax estimated at 20% left hemithoracic volume. There is no evidence of a tension component. There is contusion or atelectasis involving the collapsed left lower lung. There is no evidence of a right-sided traumatic finding.  Soft tissue window images:  Soft tissue window images show coronary artery calcifications and aortic calcification as well as ectasia of the ascending thoracic aorta with perpendicular diameter measurements of 5.5 x 5.1 cm. Upper abdomen: No acute upper abdominal traumatic findings are specifically identified. Gallstones and a left lobe hepatic cyst are incidentally noted. No evidence of acute renal trauma is identified. Skeletal: In addition of the scapular fracture, there is a nondisplaced lateral left seventh rib fracture. Advanced Degenerative spinal findings are noted. 1. Left-sided pneumothorax of at least 20% left hemithoracic volume. There is collapse of the left lower lung with density that could represent contusion or atelectasis. 2. There is a left lateral seventh rib fracture which is nondisplaced. 3. There is a left scapular fracture. 4. 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 No acute mediastinal vascular trauma is noted. ALERT:  THIS IS AN ABNORMAL REPORT-left-sided pneumothorax and left scapular and seventh rib fractures Note: Emergency department caretaker was contacted telephonically by myself at the time of this dictation communicate the finding of a sizable left-sided pneumothorax without tension component. Ct Cervical Spine Wo Contrast    Result Date: 5/1/2019  Clinical indications: Pain status post trauma. Chest pain. Neck pain. Scapular fracture. COMPARISON: None. Exposure control: This examination and all examinations utilizing ionizing radiation at this facility done so according to the ALARA (as low as reasonably achievable) principal for radiation dose reduction. TECHNIQUE: Axial, sagittal, and coronal computed tomography of the cervical spine was performed without contrast. FINDINGS: There is a left apical pneumothorax. These images reveal no evidence for acute displaced cortical destruction or spondylolisthesis.  There is diffuse loss of disc height, degenerative spondylosis, uncovertebral hypertrophy and facet arthropathy. These degenerative osseous changes result in multilevel central and foraminal stenosis. There are calcifications within the regional arteries. Otherwise regional soft tissue and osseous structures are unremarkable. Left apical pneumothorax. No acute fracture or spondylolisthesis is identified on CT of cervical spine. Diffuse degenerative disc and facet disease result in multilevel central and foraminal stenosis. Atherosclerotic vascular disease. Xr Shoulder Left (min 2 Views)    Result Date: 2019  Patient MRN: [id number] : 1922 Age:  80 years Gender: Male Order Date: 2019 3:30 PM Exam: XR SHOULDER LEFT (MIN 2 VIEWS) Number of Images: 4 views Indication:   left shoulder pain left shoulder pain Comparison: None. Findings: There is left humeral neck fracture which is displaced superiorly. There is a left scapula fracture extending to the glenoid fossa. There is severe osteopenia of the osseous structure. Joint space is maintained. There is no evidence of calcification in the soft tissue. Contour of the head of the humerus is normal. The AC joint appears to be normal. There is uncoiling and atherosclerotic change of thoracic aorta. Left humeral neck fracture which is displaced superiorly Left scapular fracture extending to the glenoid fossa Severe osteopenia. ALERT:  THIS IS AN ABNORMAL REPORT    Xr Chest Portable    Result Date: 2019  Patient MRN:  [id number] : 1922 Age: 80 years Gender: Male Order Date:  2019 8:45 PM TECHNIQUE/NUMBER OF IMAGES/COMPARISON/CLINICAL HISTORY: Chest AP 1 image one view. May 1, 2018 at 20:45 Comparison May 1, 2018 at 19:25 Placement of left-sided chest tube pneumothorax or. Follow-up study. FINDINGS: There is a left-sided pneumothorax which is a approximately 25% to as more dominant component is in the bases. There is some degree of pleural effusion.  There is a areas of atelectasis in the left base. Left-sided chest tube tip is located superiorly and medially. The pneumothorax appears to be increased since the previous study. Right lungs well-expanded. The pulmonary mass is not identified as CT is obliterated by the atelectasis in the left lower lobe. 1. Further progression of left-sided pneumothorax presently of about 25%. 2. Left-sided chest tube has been placed, tip is located superiorly and medially in the left chest cavity. Preliminary report given to Dr. Maribell Hernandez, ER physician at the time of the interpretation. ALERT:  ABNORMAL REPORT. Xr Chest 1 Vw    Result Date: 2019  Patient MRN: [id number] : 1922 Age:  80 years Gender: Male Order Date: 2019 7:45 PM Exam: XR CHEST 1 VIEW Number of Images: 1 view Indication:   chest tube chest tube Comparison: CT scan chest 2019 Findings: Chest tube is present. There is a pneumothorax seen . This is unchanged from the previous CT scan. The heart is enlarged. Lung fields demonstrate patchy bilateral infiltrates which could be related to pulmonary vascular congestion. The aorta is tortuous and ectatic Left scapular fractures again seen. Rib fracture is not as well-seen as on the CT scan    Cardiomegaly Tortuous aorta There are patchy infiltrates seen throughout both the lung fields. Pulmonary vascular congestion could have this appearance Left pneumothorax, with a left chest tube. Pneumothorax is unchanged from previous ALERT:  THIS IS AN ABNORMAL REPORT       Discharge Exam:  PHYSICAL EXAM   PSYCH: mood and affect normal, alert and oriented x 3  CONSTITUTIONAL: No apparent distress, comfortable  EYES: Sclera white, pupils equal round and reactive to light  ENMT:  Hearing normal, trachea midline, ears externally intact  RESP: Breath sounds were clear and equal with no rales, wheezes, or rhonchi. Respiratory effort was normal with no retractions or use of accessory muscles.   CV: Heart sounds were normal with a regular rate Cough and deep breath 4 - 6 times a day       WOUND/DRESSING INSTRUCTIONS:   [x] May shower      [x] No sitting in bath tub, hot tub or swimming. [x] Ice to areas of pain for first 24 hours. Heat to areas of pain after that. [x] Wash area with antibacterial soap & water. Rinse well. Pat dry with clean towel. [x] Apply thin layer of Bacitracin to affected area. Keep wounds clean and dry. [x] May remove chest wall dressing in 24hrs, replace as needed thereafter. MEDICATION INSTRUCTIONS:  [x] Take medication as prescribed. [x] When taking pain medications, you may experience dizziness or drowsiness. Do not drink alcohol or drive when taking these medications. [x] You may take Ibuprofen and/or Tylenol (over the counter) as per directions for mild pain. Limit amount of acetaminophen (Tylenol) to maximum of 4g per 24 hrs, including any prescription pain medications that may also contain acetaminophen. [x] You may experience constipation while taking pain medication, you may take over the counter stool softeners (Docusate/Colace or Senokot-S) as directed. WORK:  [x] You may not return to work until cleared by all consultants    Call physician for any of the following or for questions/concerns:   · Fever over 101° F    · Redness, swelling, hardness or warmth at the wound site (s)  · Unrelieved nausea/vomiting    · Foul smelling or cloudy drainage at the wound site (s)   · Unrelieved pain or increase in pain     · Increase in shortness of breath      Follow up:   Pilar Bower MD  [address] [facility] [address]          [facility] [place], [address] [facility] [address] [facility] [address]  [phone]    In 2 weeks      [address]  [address] [facility]  [phone]    In 2 weeks      [address]  [address] [facility] [address] [id number] [phone]           Signed:  Meka Rios

## 2019-05-03 ENCOUNTER — APPOINTMENT (OUTPATIENT)
Dept: GENERAL RADIOLOGY | Age: 84
DRG: 200 | End: 2019-05-03
Payer: MEDICARE

## 2019-05-03 LAB
ALBUMIN SERPL-MCNC: 3.4 G/DL (ref 3.5–5.2)
ALP BLD-CCNC: 68 U/L (ref 40–129)
ALT SERPL-CCNC: 13 U/L (ref 0–40)
ANION GAP SERPL CALCULATED.3IONS-SCNC: 8 MMOL/L (ref 7–16)
AST SERPL-CCNC: 25 U/L (ref 0–39)
BASOPHILS ABSOLUTE: 0.02 E9/L (ref 0–0.2)
BASOPHILS RELATIVE PERCENT: 0.2 % (ref 0–2)
BILIRUB SERPL-MCNC: 0.4 MG/DL (ref 0–1.2)
BUN BLDV-MCNC: 21 MG/DL (ref 8–23)
CALCIUM SERPL-MCNC: 8.6 MG/DL (ref 8.6–10.2)
CHLORIDE BLD-SCNC: 107 MMOL/L (ref 98–107)
CO2: 25 MMOL/L (ref 22–29)
CREAT SERPL-MCNC: 0.7 MG/DL (ref 0.7–1.2)
EOSINOPHILS ABSOLUTE: 0.1 E9/L (ref 0.05–0.5)
EOSINOPHILS RELATIVE PERCENT: 1.2 % (ref 0–6)
GFR AFRICAN AMERICAN: >60
GFR NON-AFRICAN AMERICAN: >60 ML/MIN/1.73
GLUCOSE BLD-MCNC: 110 MG/DL (ref 74–99)
HCT VFR BLD CALC: 38.7 % (ref 37–54)
HEMOGLOBIN: 13.1 G/DL (ref 12.5–16.5)
IMMATURE GRANULOCYTES #: 0.03 E9/L
IMMATURE GRANULOCYTES %: 0.4 % (ref 0–5)
LYMPHOCYTES ABSOLUTE: 1.76 E9/L (ref 1.5–4)
LYMPHOCYTES RELATIVE PERCENT: 21.5 % (ref 20–42)
MCH RBC QN AUTO: 32.5 PG (ref 26–35)
MCHC RBC AUTO-ENTMCNC: 33.9 % (ref 32–34.5)
MCV RBC AUTO: 96 FL (ref 80–99.9)
MONOCYTES ABSOLUTE: 0.79 E9/L (ref 0.1–0.95)
MONOCYTES RELATIVE PERCENT: 9.6 % (ref 2–12)
NEUTROPHILS ABSOLUTE: 5.5 E9/L (ref 1.8–7.3)
NEUTROPHILS RELATIVE PERCENT: 67.1 % (ref 43–80)
PDW BLD-RTO: 13.9 FL (ref 11.5–15)
PLATELET # BLD: 151 E9/L (ref 130–450)
PMV BLD AUTO: 9.8 FL (ref 7–12)
POTASSIUM REFLEX MAGNESIUM: 4 MMOL/L (ref 3.5–5)
RBC # BLD: 4.03 E12/L (ref 3.8–5.8)
SODIUM BLD-SCNC: 140 MMOL/L (ref 132–146)
TOTAL PROTEIN: 5.8 G/DL (ref 6.4–8.3)
WBC # BLD: 8.2 E9/L (ref 4.5–11.5)

## 2019-05-03 PROCEDURE — 71045 X-RAY EXAM CHEST 1 VIEW: CPT

## 2019-05-03 PROCEDURE — 2580000003 HC RX 258: Performed by: SURGERY

## 2019-05-03 PROCEDURE — 97530 THERAPEUTIC ACTIVITIES: CPT

## 2019-05-03 PROCEDURE — 6370000000 HC RX 637 (ALT 250 FOR IP): Performed by: SURGERY

## 2019-05-03 PROCEDURE — 80053 COMPREHEN METABOLIC PANEL: CPT

## 2019-05-03 PROCEDURE — 6360000002 HC RX W HCPCS: Performed by: STUDENT IN AN ORGANIZED HEALTH CARE EDUCATION/TRAINING PROGRAM

## 2019-05-03 PROCEDURE — 2060000000 HC ICU INTERMEDIATE R&B

## 2019-05-03 PROCEDURE — 85025 COMPLETE CBC W/AUTO DIFF WBC: CPT

## 2019-05-03 PROCEDURE — 97535 SELF CARE MNGMENT TRAINING: CPT

## 2019-05-03 PROCEDURE — 36415 COLL VENOUS BLD VENIPUNCTURE: CPT

## 2019-05-03 PROCEDURE — 99232 SBSQ HOSP IP/OBS MODERATE 35: CPT | Performed by: SURGERY

## 2019-05-03 RX ADMIN — Medication 10 ML: at 09:00

## 2019-05-03 RX ADMIN — DOCUSATE SODIUM 100 MG: 100 CAPSULE, LIQUID FILLED ORAL at 20:10

## 2019-05-03 RX ADMIN — OXYCODONE HYDROCHLORIDE 10 MG: 10 TABLET ORAL at 08:22

## 2019-05-03 RX ADMIN — METHOCARBAMOL TABLETS 500 MG: 500 TABLET, COATED ORAL at 13:00

## 2019-05-03 RX ADMIN — HEPARIN SODIUM 5000 UNITS: 10000 INJECTION INTRAVENOUS; SUBCUTANEOUS at 06:48

## 2019-05-03 RX ADMIN — DOCUSATE SODIUM 100 MG: 100 CAPSULE, LIQUID FILLED ORAL at 08:23

## 2019-05-03 RX ADMIN — METHOCARBAMOL TABLETS 500 MG: 500 TABLET, COATED ORAL at 20:10

## 2019-05-03 RX ADMIN — Medication 10 ML: at 20:11

## 2019-05-03 RX ADMIN — METHOCARBAMOL TABLETS 500 MG: 500 TABLET, COATED ORAL at 17:01

## 2019-05-03 RX ADMIN — OXYCODONE HYDROCHLORIDE 5 MG: 5 TABLET ORAL at 20:30

## 2019-05-03 RX ADMIN — HEPARIN SODIUM 5000 UNITS: 10000 INJECTION INTRAVENOUS; SUBCUTANEOUS at 14:20

## 2019-05-03 RX ADMIN — METHOCARBAMOL TABLETS 500 MG: 500 TABLET, COATED ORAL at 08:22

## 2019-05-03 ASSESSMENT — PAIN DESCRIPTION - DESCRIPTORS: DESCRIPTORS: ACHING;DISCOMFORT;SORE

## 2019-05-03 ASSESSMENT — PAIN SCALES - GENERAL
PAINLEVEL_OUTOF10: 6
PAINLEVEL_OUTOF10: 0
PAINLEVEL_OUTOF10: 0
PAINLEVEL_OUTOF10: 2
PAINLEVEL_OUTOF10: 7

## 2019-05-03 ASSESSMENT — PAIN DESCRIPTION - LOCATION: LOCATION: RIB CAGE

## 2019-05-03 ASSESSMENT — PAIN DESCRIPTION - ORIENTATION: ORIENTATION: LEFT

## 2019-05-03 ASSESSMENT — PAIN DESCRIPTION - PAIN TYPE: TYPE: ACUTE PAIN

## 2019-05-03 ASSESSMENT — PAIN DESCRIPTION - FREQUENCY: FREQUENCY: CONTINUOUS

## 2019-05-03 ASSESSMENT — PAIN DESCRIPTION - ONSET: ONSET: ON-GOING

## 2019-05-03 NOTE — PROGRESS NOTES
Consult received,chart reviewed. Will discuss with Dr. Osorio Tobaring and will advise. Thank you for consult.

## 2019-05-03 NOTE — CONSULTS
[facility]                  [address] [place],  [address]                                  CONSULTATION    PATIENT NAME: Cindy Lewis                      :        1922  MED REC NO:   [id number]                            ROOM:       2490  ACCOUNT NO:   [de-identified]                           ADMIT DATE: 2019  PROVIDER:     Jacquelyn Corey MD    CONSULT DATE:  2019    AGE:  80. SEX:  Male. CHIEF COMPLAINT:  Fall. HISTORY OF PRESENT ILLNESS:  The patient fell at a friend's house. He  had severe chest pain. He was brought to [facility] [address] on 2019. He was found to have multiple rib fractures and a hemopneumothorax. He  had a chest tube put in place. He tolerated the procedure well. He is  still having a lot of pain. He also had a fractured scapula and is  nonweightbearing on the left upper extremity with a sling. He has been  evaluated by Therapy. He is at a moderate assist level with all of his  mobility, was only able to walk about 15 feet, and he is at a max assist  level for his ADLs. I was asked to see him for planning further rehab. PAST MEDICAL HISTORY:  The patient has had several episodes of falls in  the past.  He had a previous humeral fracture. There is a history of a  seizure disorder and hypertension and a previous femoral fracture. ALLERGIES:  None known. CURRENT MEDICATIONS:  Colace, Robaxin, and oxycodone as needed for pain. HABITS:  No smoking or alcohol. REVIEW OF SYSTEMS:  Negative for prior HEENT problems. Negative for  prior cardiac or pulmonary problems. Negative for prior GI or   problems. Orthopedic is positive for multiple fractures as above. Neurologic is negative. PHYSICAL EXAMINATION:  GENERAL:  A well-nourished, well-developed white male, in no acute  distress. HEENT:  Head:  Normocephalic, atraumatic. Eyes:  Pupils equal, round,  reactive to light.   Pharynx
05/01/2019    HCT 40.8 05/01/2019    MCV 96.0 05/01/2019    MCH 32.7 05/01/2019    MCHC 34.1 05/01/2019    RDW 13.8 05/01/2019     05/01/2019    MPV 9.3 05/01/2019     PT/INR:    Lab Results   Component Value Date    PROTIME 12.0 12/06/2017    PROTIME 11.8 10/27/2010    INR 1.1 12/06/2017       Radiology Review:  X-ray left shoulder  Demonstrating a displaced scapular body fracture that extends into the glenoid. There is also previous fracture of the proximal humerus at the surgical neck with valgus impaction. IMPRESSION:  · Left scapular body fracture  · Left glenoid fracture    PLAN:  · Nonweightbearing to left upper extremity  · Patient placed in a sling for comfort  · Pain control per primary  · DVT prophylaxis per primary  · Ice to left shoulder  · CT of the left shoulder to evaluate scapular fracture and x-ray of right hand order to review.   · No acute orthopedic intervention at this time  · Follow-up with Dr. Elin Thompson in office  · Discussed with Dr. Elin Thompson
intact to L4-S1 dermatomes, compartments soft and compressible.     · Pelvis: -TTP, -Log roll, -Heel strike      DATA:    CBC:         Lab Results   Component Value Date     WBC 9.8 05/01/2019     RBC 4.25 05/01/2019     HGB 13.9 05/01/2019     HCT 40.8 05/01/2019     MCV 96.0 05/01/2019     MCH 32.7 05/01/2019     MCHC 34.1 05/01/2019     RDW 13.8 05/01/2019      05/01/2019     MPV 9.3 05/01/2019      PT/INR:          Lab Results   Component Value Date     PROTIME 12.0 12/06/2017     PROTIME 11.8 10/27/2010     INR 1.1 12/06/2017         Radiology Review:  X-ray left shoulder  Demonstrating a displaced scapular body fracture that extends into the glenoid. There is also previous fracture of the proximal humerus at the surgical neck with valgus impaction.     IMPRESSION:  · Left scapular body fracture  · Left glenoid fracture     PLAN:  · Nonweightbearing to left upper extremity  · Patient placed in a sling for comfort for closed treatment of scapula fracture  · Use sling when OOB  · Pain control per primary  · DVT prophylaxis per primary  · Ice to left shoulder  · CT of the left shoulder to evaluate scapular fracture and x-ray of right hand order to review. · No acute orthopedic intervention at this time  · Follow-up with Dr. Yael Mckay in office in 2 weeks    Of note greater than 50 minutes were spent on the floor and with patient performing H&P, reviewing labs and imaging, and discussing plan. Half the time was spent counseling and coordinating care.   ·

## 2019-05-03 NOTE — PROGRESS NOTES
Physical Therapy  Facility/Department: [address] PICU  Daily Treatment Note    NAME: Ashly Craig  : 1922  MRN: [id number]    Date of Service: 5/3/2019    Evaluating Therapist: Delvis Valente PT, DPT    Room #: 5320Q  DIAGNOSIS: Trauma  PRECAUTIONS: Falls, O2, L chest tube, NWB LUE, sling for comfort LUE  S/p fall at home with comminuted L scapula fx, L humeral head fx, L lateral 7th rib fx, L pneumothorax    Social:  Pt lives alone in a 1 floor plan condo with no step(s) and no rail(s) to enter. Prior to admission pt walked with a Hurrycane. Reported independent with ADLs/IADLs. Actively driving. Initial Evaluation  Date: 19 Treatment  Date: 5/3   Short Term/ Long Term   Goals   Was pt agreeable to Eval/treatment? Yes  Yes    Does pt have pain? Reported 7/10 in L rib/CT site. Nurse notified of patient needs. 4/10 Abdominal Discomfort    Bed Mobility  Rolling: Min S  Supine to sit: Min A  Sit to supine: Mind A  Scooting: Min A Rolling: Min A  Supine to sit: Min A  Sit to supine: NT  Scooting: SBA to EOB  Supervision   Transfers Sit to stand: Mod A +1-2  Stand to sit: Mod A +1-2  Stand pivot: Mod A +1-2 Sit to stand: Min A  Stand to sit: Min A  Stand pivot: Min A  SBA   Ambulation    15 feet, 8 feet with no AD with Mod A +2 50 feet x3 using LBQC with Min<>Mod A  (Second person required for line management) >150 feet with AAD with  SBA   Stair negotiation: ascended and descended  NT NT 4 steps with 1 rail with Min A   AM-PAC Score       Pt is alert and Oriented x 4. Pt appears to be demonstrating improved safety awareness this date. Pt did not attempt to WB through LUE throughout treatment session. No impulsiveness noted this date. Balance: Seated: Supervision sitting EOB;  Standing: Min A using LBQC  Sensation: chronic numbness/tingling in feet  Edema: None noted  Endurance: good  Bed/Chair alarm: No alarm set upon arrival      Comments/Treatment:  Patient cleared by nurse and agreeable to assessment. Patient received supine and required education on benefits of early mobility. Pt in agreement following education. Pt transferred to sitting EOB with increased time due to not being able to use LUE to assist.  Pt denied any symptoms sitting up EOB. Pt sat EOB <5 minutes and gown adjusted and second gown donned as robe. Pt demonstrated good sitting balance. Gait training performed with use of LBQC due to pt regularly using cane with LUE. STS performed with assist to achieve upright posture and steadying assist.  VCs for sequencing, upright posture, forward gaze and LBQC approximation during amb. Pt frequently had difficulty with sequencing and required constant assist for balance and to correct LOB. Second person to assist with line management of O2 and CT. Pt's SaO2 dropped from 96% on 2L at rest to 92% on 2L with amb. Pt quickly recovered to 97% on 2L O2 when sitting in bedside chair. Pt educated on benefits of OOB activity, performing incentive spirometry (with instruction on how to perform and performed return demonstration) and seated TE. Pt left sitting up in chair with call button within reach and son at bedside. Pt would benefit from intensive therapy program for balance retraining, strength training and gait training to reduce risk for falls and facilitate return to functional independence. Patient education  Pt educated on benefits of OOB activity to prevent iatrogenic effects, transfer training, gait training and sequencing of LBQC, safety with mobility, WB restrictions and need for rehab following discharge to promote return to functional level of [place]. Patient response to education:   Pt verbalized understanding Pt demonstrated skill Pt requires further education in this area   Yes  Partially with verbal cues and assist Yes      Patient is making good progress towards established PT goals and goals have been adjusted as appropriate.     Will continue with current POC.       Time in: [address]  Time out: Karena Richter Minium, DPT  License [id number]

## 2019-05-03 NOTE — PROGRESS NOTES
floor    Bathroom:  [x] 1st floor  [] 2nd floor    Prior Functional Level: Independent for: ADLs, IADLs, driving  Assistance for: ambulated with hurry cane    Dominant hand: [x] Right  [] Left    Previous Home Equipment:  [x] Marlen Hoseay [] Grab bars [] Orthotic / prosthetic   [x] Shower chair [] Tub bench  [] 3-in-1 Commode [] Long handle sponge   [] Oxygen [] Sock aide  [] Wheelchair  [] motorized wc/scooter  [] Wheelchair cushion   [] Crutches [] Long handle shoehorn  [] Reachers [] Toilet seat elevator  [] Walker(wheeled)   [] Walker(standard) [] Mechanical lift    [] None of the above    MEDICAL UPDATE:  History of present admission: Allyssa Green is a 80 y.o. male who presents after a standing height mechanical fall yesterday. He tripped over a rug. Denies hitting his head, denies LOC. He complains of left shoulder pain, and chest pain. At [facility] he was found to have a left sided pneumothorax with left sided 7th rib fx, scapular body fracture that extends into the glenoid, and had a chest tube placed. He denies other complaints. Takes ASA denies other [address] [place] [address]. Ortho surgery consulted and no acute surgical interventions needed at this time. Pt is to be NWB to LUE and wear sling. Past Medical:  Past Medical History:   Diagnosis Date    Hypertension         Influenza vaccine given:  [] yes   [] no  [x] n/a (out of season)    Has patient had 2 or more falls in the past year? [] yes   [x] no Date  Has patient had any fall with injury in the past year? [x] yes   [] no  [] unknown  Has patient had major surgery during past 100 days prior to admission?    [] yes   [x] no    Surgical History:  Past Surgical History:   Procedure Laterality Date    HERNIA REPAIR      HIP SURGERY Right 12/07/2017    ORIF right hip  Dr. Luci Zacarias       Current Co-morbidities:  [] Alzheimer's   [] Dysphasia     [] Parkinsonism  [] Amputation   [] Edema of larynx  [] Peripheral artery disease   [] Anemia      [] Encephalopathy  [] Peripheral vascular disease  [] Anxiety   [] Gangrene   [] Pneumonia  [] Aphasia   [] Gout    [] Polyneuropathy  [] Asthma   [] Heart Failure (diastolic) [] Post-polio syndrome  [] Atrial fibrillation  [] Heart Failure (left-sided) [] Pseudomonas enteritis   [] Blind    [] Heart Failure (right-sided) [] Pulmonary embolism  [] Cellulitis     [] Heart Failure (systolic) [] Renal dialysis  [] Clostridium difficile  [] Hemiparesis   [] Renal failure  [] Congestive heart failure [x] Hypertension   [] Rheumatoid arthritis  [] COPD   [] Hypotension   [] Seizure disorder   [] Coronary Artery Disease [] Hypothyroidism  [] Septicemia   [] Deaf    [] Malnutrition   [] Sleep apnea  [] Depression   [] Morbid obesity  [] Spinal cord injury  [] Diabetes   [] MRSA   [] Stroke  [] Diabetic nephropathy  [] Myocardial infarction  [] Tracheostomy  [] Diabetic neuropathy  [] Osteoarthritis  [] Traumatic brain injury   [] Diabetic retinopathy  [] Osteoporosis   [] Urinary tract infection  [] DVT    [] Pancytopenia  [] Vocal cord paralysis  [x] pneumothorax    [x] rib fractures    [x] scapula fracture    Medical/Functional Conditions, Risk for Clinical Complications/Interventions Required:    Medical/Functional Conditions: multiple fractures (montor pain), pneumothorax (monitor respiratory status), Hypertension ( monitor BP)    Risk for Medical/Clinical Complications: increased pain, respiratory distress, hypo/hypertension, DVT, PE, falls, injury, skin breakdown, abnormal labs/vitals    CLINICAL DATA:     Height : 6'     Weight:  170#   BMI: 23.1          Date: 5/3/19 Date: 5/6/19 Date:    temperature 98.3 98.2    pulse 68 92    respirations 18 18    Blood pressure 142/68 133/77    Pulse oximeter 98% 2L NC 94% room air         ALLERGIES: Patient has no known allergies. DIET : DIET GENERAL;    Current Lab and Diagnostic Tests:   No results found for this or any previous visit (from the past 24 hour(s)).   Xr Ribs Left Include Chest (min 3 Views)  Result Date: 5/1/2019  1. Comminuted left scapular fracture. 2. Age-indeterminate fracture left proximal humerus. 3. Nodular abnormality or mass projecting along the left cardiac border not seen on previous exam. Findings are indeterminate and could reflect pulmonary mass. The possibility of a cardiac aneurysm is also not excluded. Further evaluation with CT scan of the chest is recommended. 4. No definitive displaced rib fracture is noted, this can be further evaluated with the above recommended CT scan of the chest.    Xr Humerus Left (min 2 Views)  Result Date: 5/1/2019  Left humeral neck fracture which is displaced superiorly Left scapula fracture extending to the glenoid fossa. Severe osteopenia. Ct Head Wo Contrast  Result Date: 5/1/2019  No evidence for acute intracranial process. Cortical atrophy and chronic periventricular microangiopathy. Ct Chest Wo Contrast  Result Date: 5/1/2019  -left-sided pneumothorax and left scapular and seventh rib fractures Note: Emergency department caretaker was contacted telephonically by myself at the time of this dictation communicate the finding of a sizable left-sided pneumothorax without tension component. Ct Cervical Spine Wo Contrast  Result Date: 5/1/2019  Left apical pneumothorax. No acute fracture or spondylolisthesis is identified on CT of cervical spine. Diffuse degenerative disc and facet disease result in multilevel central and foraminal stenosis. Atherosclerotic vascular disease. Ct Shoulder Left Wo Contrast  Result Date: 5/2/2019   1. Comminuted intra-articular fracture of the left scapula. 2. Suspected remote fracture proximal left humerus, thought to be partially visualized on chest x-ray 5/9/2017, clinical correlation with patient's past medical history is recommended. 3. There may be an additional area of superimposed minimally displaced fracture along the anterior superior aspect of the left humerus with cortical lucency noted.  4. Left pneumothorax with pleural separation of approximately 1.2 cm. 5. Incomplete visualization of a large descending thoracic aortic aneurysm measuring at least 5.2 cm anterior posterior and extends off the field-of-view measuring greater than 4.4 cm transverse. 6. Ascending thoracic aortic aneurysm measured at approximately 3.6 x 3.6 cm with thoracic aortic vascular calcifications. Xr Shoulder Left (min 2 Views)  Result Date: 5/1/2019  Left humeral neck fracture which is displaced superiorly Left scapular fracture extending to the glenoid fossa Severe osteopenia. Xr Chest Portable  Result Date: 5/2/2019   1. Left-sided pneumothorax with pleural separation of 1.9 cm, stable when compared with the examination of 5/1/2019 but increased/worsened since the exam earlier on 5/2/2019. 2. Stable, enlarged cardiomediastinal silhouette with thoracic aortic vascular calcifications. 3. Left basilar airspace disease suggestive of a left basilar infiltrate/pneumonia and left pleural effusion. Xr Chest Portable  Result Date: 5/1/2019  1. Further progression of left-sided pneumothorax presently of about 25%. 2. Left-sided chest tube has been placed, tip is located superiorly and medially in the left chest cavity. Ct 3d Reconstruction  Result Date: 5/2/2019  . Comminuted, intra-articular fracture of the left scapula. Suspected remote fracture left proximal humerus again noted. Please see CT cervical report for further details.      Additional labs or diagnostic studies needed before admission to rehabilitation unit:  None noted    Medications:   polyethylene glycol  17 g Oral BID    senna  1 tablet Oral Nightly    carBAMazepine  200 mg Oral QAM AC    finasteride  5 mg Oral QAM AC    metoprolol succinate  12.5 mg Oral QAM AC    tamsulosin  0.4 mg Oral Dinner    methocarbamol  500 mg Oral 4x Daily    heparin (porcine)  5,000 Units Subcutaneous Q8H    sodium chloride flush  10 mL Intravenous 2 times per day    docusate sodium  100 mg Oral BID       hydrALAZINE, labetalol, sodium chloride flush, acetaminophen, oxyCODONE **OR** oxyCODONE, magnesium hydroxide, ondansetron    SPECIAL PRECAUTIONS: [x] No current precautions  [] Cardiac  [] Renal [] Sternal [] Respiratory      [] Neurological           [] Hip  [] Spinal [] Seizure  [] Aspiration  [] Isolation precautions:    [] Contact   [] Respiratory   [] Protective     [] Droplet    [x] Weight Bearing precautions:         [x] Non Weight Bearing LUE        [] Toe Touch Weight Bearing        [] Partial Weight Bearing        [] Weight Bearing as Tolerated        [x] Fall Risk:   [x] Recent history of falls [x] Falls risk level (Price Scale): high (85)       [x] Bed Alarm    [] Do not leave alone in the bathroom    [] Chair Alarm    [] Cognitive impairment      [] One to One supervision  [] Sitter / Tele sitter   [] Safety enclosure bed  [] Decreased balance     SPECIAL REHABILITATION NEEDS:   [x] IV Therapy: [x] PRN Adapter  [] Midline  [] PICC      [] Central Line    [] TPN       [x] Oxygen: [] Trach [] Bi-PAP [] CPAP  [x] Nasal cannula  [x] Liters:2     [] Wound Care:   [] Pressure ulcers(stage and location)    [] Wound vac   [] Wound or incision care    [x] Pain Management (level of pain, meds): 9-0 rib pain, controlled with robaxin and oxycodone     [] Incontinence Bladder [] Beyer  Insertion date:   []Hemodialysis and  Frequency:   [] Incontinence Bowel    [] Last bowel movement :none charted    [x] Substance use history:  [x] Tobacco (history)  [x] Alcohol  [] Other     [] Ethnic  [] Cultural  [] Spiritual  [] Language [] Needs  [] Other than English  [] Hearing Impaired  [] Visually Impaired  [] Speaking Impaired  [] Blind    [] Special equipment:  [x] Devices/Splints  [x] Type sling LUE   [] Brace   [] Type  [] Bariatric bed  [] Extra wide commode  [] Extra wide wheelchair [] Extra wide walker  [] Anurag walker  [] Anurag wheelchair  [] Transfer lift    [] Other equipment     FUNCTIONAL STATUS PT / [place] / SPEECH EVALS:  FIM / EVAL Discipline Initial: 5/3/19 Follow Up: 5/6/19 Current:    Eating OT 5/2/19 Minimum assistance  Set up     Grooming OT Minimum assistance  Minimum assistance     Bathing OT nt nt    Dressing Upper Extremity OT Max Assist  Max Assist     Dressing Lower Extremity OT Dependent  Minimum assistance     Toileting OT nt Minimum assistance     Toilet Transfers OT nt Minimum assistance     Tub/Shower Transfers OT nt nt    Homemaking OT nt nt    Bed Mobility PT Minimum assistance   Minimum assistance     Bed/Wheelchair Transfers PT Minimum assistance   Minimum assistance     Locomotion Walk / Wheelchair  Device:  Distance: PT 50 feet x3 using [employer] with Min<>Mod A  (Second person required for line management) 5-' large base quad cane Moderate Assist to Minimum assistance     Endurance PT      Expression SP      Social Interaction SP      Problem Solving SP      Memory SP      Comprehension SP      Swallowing SP      Bowel Management NSG      Bladder Management NSG        Comments on Functional Status: patient is impulsive, poor balance, unsteady.  Will benefit from 3 hours of acute rehab therapy daily , improve gait, ADL's, self care to supervision    [x] Able to participate a minimum of 3 hours per day of therapy intervention    Required treatments/services: [x] Rehabilitation nursing [] Dietitian / nurtition                 [x] Case management  [] Respiratory Therapy      [x] Social work   [] Other     Required Therapy:  Therapy Hours per Day Days per Week Therapeutic Interventions Required   [x] Physical Therapy 1.5 5-7 Gait,transfers, Safety, strength, education, endurance    [x] Occupational Therapy 1.5 5-7 ADL's, Safety, strength, education, endurance    [] Speech Pathology      [] Prosthetics / Orthotics       []         Anticipated Discharge Plan:   Anticipated DME Needs:  [x] Home     [] Commode   [] Alone    [] Wheelchair   [x] Supervised    [] Inesnattyaraceli Aubrey   [] Assist    [] Oxygen  [] LTAC     [] Hospital Bed  [] Assisted Living    [] Ramp  [] Darrell Hough   [x] To Be Determined      Anticipated Home Health Services:  Anticipated Outpatient Services:  [] PT       [] PT  [] OT      [] OT  [] Speech     [] Speech  [] Nursing     [] Dialysis  [] Aide      [x] To Be Determined  [x] To Be Determined    Anticipated support group:  [] Amputation  [] Multiple Sclerosis  [] Stroke  [] Brain Injury  [] Spinal cord injury  [] Other     Barriers to discharge: none noted    Discharge Support: [] Patient lives alone and does not have a caregiver available     [x] Patient has a caregiver available     [] Discharge plan has been verified with patient's caregiver    [] Caregiver is in agreement with the discharge plan     Expected functional status for safe discharge: supervision    Patient/support person goals: return home and back to normal    Expected length of stay: 10-14 days    Discussed expected length of stay and agreeable to IRF plan: [x] Yes   [] No    Impairment Group Category: 8.9    Etiological Diagnosis: multiple fractures    Primary Rehabilitation Diagnosis: multiple fractures      Electronically signed by Zaki Simpson RN on 5/6/2019 at 11:58 AM    Prescreen completed __________________________________ (signature of prescreener)    Date:   5/6/19 Time:  1200      JUSTIFICATION FOR ADMISSION TO ACUTE REHABILITATION:  Patient has suffered decline in functional abilities for gait, transfers, ADL's and IADL's as well as endurance. Patient has functional deficits requiring intensive therapy across multiple disciplines in order to return home safely. Patient will need physician oversight for respiratory issues, abnormal vital signs, nutritional and hydration status, safety issues, medications and therapy modalities. PT, OT  will work on deficits as noted in evaluations.  Case management and social work will provide services for DME and management of a safe discharge home. RECOMMEND LEVEL OF CARE  Recommend inpatient rehabilitation: [x] Yes   [] No  If no indicate reason:    [] Functional level too high  [] Unmotivated  [] No insurance carrier approval [] Unlikely to return to community  [] No medical necessity  [] Patient or family chose other facility  [] Too medically complex  [] Inadequate discharge plan  [] Rehabilitation bed unavailable [] Functional level too low  [] patient or family refused ARU    If patient not accepted for IRF admission, recommended level of care:  [] [address]  [] [address]  [] [place]   [] Home Care  [] Other      [] LTAC       Physician Assigned:  [] Dr. Rhea Souza [] Dr. Mike Chavez  [x] Dr. Anjali Novoa     [] Dr. Rosemary Cadet [] Dr. Malini Kingston  [] Dr. Vane Sanchez (if not admitted within 48 hours of initial pre-screen)    Medical Update/Changes: Prescreen updated to reflect current data since initiated. Functional Update/Changes: Therapy notes updated on prescreen graph to reflect current status.        Reviewer Signature:_____________________________________    Date:  5/6/19 Time: 1200    PHYSICIAN ADMISSION DETERMINATION AND REVIEW UPDATE:     ____________________________________________________________________  ____________________________________________________________________  ____________________________________________________________________  ____________________________________________________________________  ____________________________________________________________________                                                                                                                                                                                                                                                                                                                                            Physician Signature:_____________________________________    Print Signature:_________________________________________    Date: 5/6/19    Time:  1200

## 2019-05-03 NOTE — PROGRESS NOTES
[facility]  ATTENDING PHYSICIAN PROGRESS NOTE     I have examined the patient and  reviewed the record. I have reviewed all relevant labs and imaging data. The following summarizes my clinical findings and independent assessment. CC: fall    S.pt feels good and has no complaints    O. Vitals:    05/03/19 0754   BP: (!) 142/68   Pulse: 68   Resp: 18   Temp: 98.3 °F (36.8 °C)   SpO2: 98%     PHYSICAL EXAM   PSYCH: mood and affect normal, alert and oriented x 3  CONSTITUTIONAL: No apparent distress, comfortable  EYES: Sclera white, pupils equal round and reactive to light  ENMT:  Hearing normal, trachea midline, ears externally intact  RESP: Breath sounds were clear and equal with no rales, wheezes, or rhonchi. Respiratory effort was normal with no retractions or use of accessory muscles. Left chest wall less/ Left chest tube no leak  CV: Heart sounds were normal with a regular rate and rhythm. No pedal edema  GI/ Abdomen: The abdomen was soft and non distended. There was no tenderness, guarding, rebound, or rigidity. There was no                     masses, hepatosplenomegaly, or hernias. ASSESSMENT:  Active Problems:    Trauma    Closed fracture of multiple ribs of left side    Hemopneumothorax on left    Closed fracture of left scapula  Resolved Problems:    * No resolved hospital problems. *       PLAN:  Left hemopneumothorax--CXR reveiwed.   No PTX  Will water seal chest tube  Multiple rib fx--pain control/ pulmonary toilet  General diet  Left scapula fx--NWB LUE/   DVT Proph: SCDS/ heparin tid     PT OT  Consult AR    Nikole Snato MD, FACS  5/3/2019  10:46 AM

## 2019-05-03 NOTE — PROGRESS NOTES
Limited insight into the extent of his injuries.                   Functional Assessment:    Initial Eval Status  Date: 5-2-19 Treatment Status  Date: 5/3/19 Short Term Goals  Treatment frequency: PRN 2-4 x/week   Feeding Min A/Set up     Able to feed self w/ utensils/drink from cup w/ R UE, Required Assist to cut food, open containers d/t precautions for limited ROM/use of L UE Set up  Mod I   Grooming Min A/Set up     Required Min A/Set up to complete simple grooming tasks while seated EOB, unsafe to stand at sink d/t limited endurance/dizziness /impulsiveness Min A  Standing at sink  Close SUP  Standing At The Sink   UB Dressing Max A/Set up     Required Max A to don/doff hospital gown, sling w/ Mod-Max VCs for safe tech seated EOB Max A  Per eval  Mod A/Set up   LB Dressing DEP     Max A to don/doff socks/slippers/thread feet into pants seated EOB, Mod A of 1 for standing balance + Mod A of 1 for clothing adjustment  Min A  To don/doff socks while seated EOB. Min A for standing. Cuing to maintain precautions. Max A   Bathing NT       N/T  Mod A   Toileting DEP     Mod A of 1 for standing balance, Mod A of 1 for clothing adjustment, able to complete own hygiene Min A- toilet transfer  Using grab bar  Mod A   Bed Mobility  Rolling:   Mod A  Repositioning:  Min A/Mod VCs   Supine to Sit:  NT    Sit to Supine:  Min A/Mod VCs     Pt ed for log-rolling tech  Sit to Supine:  Min A  Educated on technique to increase independence.  Min A   Functional Transfers Sit <> stand:  DEP   Mod A of 1-2 for safety/line mgmt      From EOB 2x, Commode 1x, pt ed for safety/hand placement Min A- sit<->stand  Cuing on hand placement  Mod A   Functional Mobility DEP     Mod A of 2 for safety and line mgmt ~ 15 mins + 10' w/ seated rest break b/t trials, pt ed/Max VCs for safety Mod A  Short home distance with HHA.  Mod A   Balance Sitting:  Close SUP     Static:  Good (-) - Close SUP at EOB    Dynamic:  Fair(+) w/ at EOB/commode     Standing: Mod A of 2    Static:  Fair (+) - Min A of 2    Dynamic:  Mod A of 2 Sitting:      Static:  Supervision    Dynamic: SBA     Standing:     Static:  Min A    Dynamic:  Mod A       Activity Tolerance Tolerated Sitting:  EOB > 15 mins w/ ax, Commode ~ 10 mins w/ ax  Tolerated Standing:  ~ 3 mins + 2 mins Fair      Visual/  Perceptual WFL  Glasses: Yes        Hearing WFL  Hearing Aids  No         Comments: Upon arrival pt seated in bedside chair. Pt educated on techniques to increase independence and safety during ADL's, bed mobility, and functional transfers. At end of session pt left supine in bed, call light within reach. · Pt has made fair progress towards set goals.      · Continue with current plan of care    Time in: 10:20  Time out: 10:45  Total Tx Time: 25 mins    Donaldo Coronado

## 2019-05-04 ENCOUNTER — APPOINTMENT (OUTPATIENT)
Dept: GENERAL RADIOLOGY | Age: 84
DRG: 200 | End: 2019-05-04
Payer: MEDICARE

## 2019-05-04 LAB
ALBUMIN SERPL-MCNC: 3.4 G/DL (ref 3.5–5.2)
ALP BLD-CCNC: 68 U/L (ref 40–129)
ALT SERPL-CCNC: 13 U/L (ref 0–40)
ANION GAP SERPL CALCULATED.3IONS-SCNC: 11 MMOL/L (ref 7–16)
AST SERPL-CCNC: 22 U/L (ref 0–39)
BASOPHILS ABSOLUTE: 0.03 E9/L (ref 0–0.2)
BASOPHILS RELATIVE PERCENT: 0.4 % (ref 0–2)
BILIRUB SERPL-MCNC: 0.7 MG/DL (ref 0–1.2)
BUN BLDV-MCNC: 25 MG/DL (ref 8–23)
CALCIUM SERPL-MCNC: 8.9 MG/DL (ref 8.6–10.2)
CHLORIDE BLD-SCNC: 106 MMOL/L (ref 98–107)
CO2: 22 MMOL/L (ref 22–29)
CREAT SERPL-MCNC: 0.8 MG/DL (ref 0.7–1.2)
EOSINOPHILS ABSOLUTE: 0.27 E9/L (ref 0.05–0.5)
EOSINOPHILS RELATIVE PERCENT: 3.4 % (ref 0–6)
GFR AFRICAN AMERICAN: >60
GFR NON-AFRICAN AMERICAN: >60 ML/MIN/1.73
GLUCOSE BLD-MCNC: 121 MG/DL (ref 74–99)
HCT VFR BLD CALC: 38.6 % (ref 37–54)
HEMOGLOBIN: 13 G/DL (ref 12.5–16.5)
IMMATURE GRANULOCYTES #: 0.03 E9/L
IMMATURE GRANULOCYTES %: 0.4 % (ref 0–5)
LYMPHOCYTES ABSOLUTE: 2.79 E9/L (ref 1.5–4)
LYMPHOCYTES RELATIVE PERCENT: 35.5 % (ref 20–42)
MCH RBC QN AUTO: 32.3 PG (ref 26–35)
MCHC RBC AUTO-ENTMCNC: 33.7 % (ref 32–34.5)
MCV RBC AUTO: 95.8 FL (ref 80–99.9)
MONOCYTES ABSOLUTE: 0.75 E9/L (ref 0.1–0.95)
MONOCYTES RELATIVE PERCENT: 9.6 % (ref 2–12)
NEUTROPHILS ABSOLUTE: 3.98 E9/L (ref 1.8–7.3)
NEUTROPHILS RELATIVE PERCENT: 50.7 % (ref 43–80)
PDW BLD-RTO: 13.9 FL (ref 11.5–15)
PLATELET # BLD: 177 E9/L (ref 130–450)
PMV BLD AUTO: 9.7 FL (ref 7–12)
POTASSIUM REFLEX MAGNESIUM: 4.2 MMOL/L (ref 3.5–5)
RBC # BLD: 4.03 E12/L (ref 3.8–5.8)
SODIUM BLD-SCNC: 139 MMOL/L (ref 132–146)
TOTAL PROTEIN: 6.1 G/DL (ref 6.4–8.3)
WBC # BLD: 7.9 E9/L (ref 4.5–11.5)

## 2019-05-04 PROCEDURE — 71045 X-RAY EXAM CHEST 1 VIEW: CPT

## 2019-05-04 PROCEDURE — 2580000003 HC RX 258: Performed by: SURGERY

## 2019-05-04 PROCEDURE — 99232 SBSQ HOSP IP/OBS MODERATE 35: CPT | Performed by: SURGERY

## 2019-05-04 PROCEDURE — 6360000002 HC RX W HCPCS: Performed by: STUDENT IN AN ORGANIZED HEALTH CARE EDUCATION/TRAINING PROGRAM

## 2019-05-04 PROCEDURE — 85025 COMPLETE CBC W/AUTO DIFF WBC: CPT

## 2019-05-04 PROCEDURE — 80053 COMPREHEN METABOLIC PANEL: CPT

## 2019-05-04 PROCEDURE — 2060000000 HC ICU INTERMEDIATE R&B

## 2019-05-04 PROCEDURE — 97530 THERAPEUTIC ACTIVITIES: CPT

## 2019-05-04 PROCEDURE — 36415 COLL VENOUS BLD VENIPUNCTURE: CPT

## 2019-05-04 PROCEDURE — 6370000000 HC RX 637 (ALT 250 FOR IP): Performed by: SURGERY

## 2019-05-04 RX ADMIN — HEPARIN SODIUM 5000 UNITS: 10000 INJECTION INTRAVENOUS; SUBCUTANEOUS at 14:09

## 2019-05-04 RX ADMIN — OXYCODONE HYDROCHLORIDE 10 MG: 10 TABLET ORAL at 09:48

## 2019-05-04 RX ADMIN — METHOCARBAMOL TABLETS 500 MG: 500 TABLET, COATED ORAL at 08:51

## 2019-05-04 RX ADMIN — Medication 10 ML: at 08:51

## 2019-05-04 RX ADMIN — METHOCARBAMOL TABLETS 500 MG: 500 TABLET, COATED ORAL at 20:42

## 2019-05-04 RX ADMIN — Medication 10 ML: at 20:43

## 2019-05-04 RX ADMIN — DOCUSATE SODIUM 100 MG: 100 CAPSULE, LIQUID FILLED ORAL at 20:42

## 2019-05-04 RX ADMIN — METHOCARBAMOL TABLETS 500 MG: 500 TABLET, COATED ORAL at 17:00

## 2019-05-04 RX ADMIN — DOCUSATE SODIUM 100 MG: 100 CAPSULE, LIQUID FILLED ORAL at 08:51

## 2019-05-04 RX ADMIN — OXYCODONE HYDROCHLORIDE 10 MG: 10 TABLET ORAL at 05:24

## 2019-05-04 RX ADMIN — HEPARIN SODIUM 5000 UNITS: 10000 INJECTION INTRAVENOUS; SUBCUTANEOUS at 05:20

## 2019-05-04 RX ADMIN — HEPARIN SODIUM 5000 UNITS: 10000 INJECTION INTRAVENOUS; SUBCUTANEOUS at 22:23

## 2019-05-04 RX ADMIN — METHOCARBAMOL TABLETS 500 MG: 500 TABLET, COATED ORAL at 14:09

## 2019-05-04 ASSESSMENT — PAIN DESCRIPTION - PAIN TYPE: TYPE: ACUTE PAIN

## 2019-05-04 ASSESSMENT — PAIN SCALES - GENERAL
PAINLEVEL_OUTOF10: 7
PAINLEVEL_OUTOF10: 7
PAINLEVEL_OUTOF10: 0

## 2019-05-04 ASSESSMENT — PAIN DESCRIPTION - LOCATION: LOCATION: RIB CAGE

## 2019-05-04 NOTE — PLAN OF CARE
Problem: Falls - Risk of:  Goal: Will remain free from falls  Description  Will remain free from falls  5/3/2019 2117 by Cande Abdullahi RN  Outcome: Met This Shift  5/3/2019 1447 by Geovanni Lopez RN  Outcome: Met This Shift  Goal: Absence of physical injury  Description  Absence of physical injury  Outcome: Met This Shift     Problem: Pain:  Goal: Pain level will decrease  Description  Pain level will decrease  5/3/2019 2117 by Cande Abdullahi RN  Outcome: Not Met This Shift  5/3/2019 1447 by Geovanni Lopez RN  Outcome: Met This Shift  Goal: Control of acute pain  Description  Control of acute pain  5/3/2019 2117 by Cande Abdullahi RN  Outcome: Not Met This Shift  5/3/2019 1447 by Geovanni Lopez RN  Outcome: Met This Shift     Problem: Risk for Impaired Skin Integrity  Goal: Tissue integrity - skin and mucous membranes  Description  Structural intactness and normal physiological function of skin and  mucous membranes.   Outcome: Not Met This Shift

## 2019-05-04 NOTE — PROGRESS NOTES
Left chest tube removed at beside after maximal inspiration with breath held. No drainage. No evidence of infection. No audible air leak. No complications. Pt stable without any changes in vital signs. Occlusive dressing placed.   CXR ordered for 4hr after removal.    Jesusita Hill DO  Resident, PGY-1  5/4/2019  10:06 AM

## 2019-05-04 NOTE — PROGRESS NOTES
Physical Therapy  Facility/Department: [address] PICU  Daily Treatment Note    NAME: Aaliyah Jose  : 1922  MRN: [id number]    Date of Service: 2019    Evaluating Therapist: Darell Ojeda PT, DPT    Room #: 4008V  DIAGNOSIS: Trauma  PRECAUTIONS: Falls, O2, L chest tube, NWB LUE, sling for comfort LUE  S/p fall at home with comminuted L scapula fx, L humeral head fx, L lateral 7th rib fx, L pneumothorax    Social:  Pt lives alone in a 1 floor plan condo with no step(s) and no rail(s) to enter. Prior to admission pt walked with a Hurrycane. Reported independent with ADLs/IADLs. Actively driving. Initial Evaluation  Date: 19 Treatment  Date:    Short Term/ Long Term   Goals   Was pt agreeable to Eval/treatment? Yes  Yes    Does pt have pain? Reported 7/10 in L rib/CT site. Nurse notified of patient needs. no    Bed Mobility  Rolling: Min S  Supine to sit: Min A  Sit to supine: Mind A  Scooting: Min A Rolling: Min A  Supine to sit: Min A  Sit to supine: NT  Scooting: SBA to EOB  Supervision   Transfers Sit to stand: Mod A +1-2  Stand to sit: Mod A +1-2  Stand pivot: Mod A +1-2 Sit to stand: Min A  Stand to sit: Min A  Stand pivot: Min A with LBQC SBA   Ambulation    15 feet, 8 feet with no AD with Mod A +2 75 feet using LBQC with Min A  (Second person required for line management) >150 feet with AAD with  SBA   Stair negotiation: ascended and descended  NT NT 4 steps with 1 rail with Min A   AM-PAC Score          Comments/Treatment:  Pt supine in bed upon entering room. Pt transferred to EOB requiring assist for trunk. Pt on 2L o2 throughout tx with spO2 maintaining >95%Pt performed 10 reps ankle pumps and LAQ to BLE while seated EOB with SBA. Pt ambulated with inconsistent sequencing leading to balance compromise at times. Pt required cues to decrease sergio at times and for AD sequencing and placement. Pt left seated in bedside chair with all needs met and call light within reach. Patient education  Pt educated on STS, gait sequencing with AD, standing posture    Patient response to education:   Pt verbalized understanding Pt demonstrated skill Pt requires further education in this area   Yes  Partially with verbal cues and assist Yes        Time in: 6 [facility]  Time out: [address] PTA [id number]

## 2019-05-04 NOTE — PROGRESS NOTES
[facility]  ATTENDING PHYSICIAN PROGRESS NOTE     I have examined the patient and  reviewed the record. I have reviewed all relevant labs and imaging data. The following summarizes my clinical findings and independent assessment. CC: fall    S.pt feels good and has no complaints. He is ready for the chest tube to come out    O.  Vitals:    05/04/19 0750   BP: 131/64   Pulse: 103   Resp: 18   Temp: 98.7 °F (37.1 °C)   SpO2: 94%     PHYSICAL EXAM   PSYCH: mood and affect normal, alert and oriented x 3  CONSTITUTIONAL: No apparent distress, comfortable  EYES: Sclera white, pupils equal round and reactive to light  ENMT:  Hearing normal, trachea midline, ears externally intact  RESP: Breath sounds were clear and equal with no rales, wheezes, or rhonchi. Respiratory effort was normal with no retractions or use of accessory muscles. Left chest wall less/ Left chest tube no leak  CV: Heart sounds were normal with a regular rate and rhythm. No pedal edema  GI/ Abdomen: The abdomen was soft and non distended. There was no tenderness, guarding, rebound, or rigidity. There was no                     masses, hepatosplenomegaly, or hernias. ASSESSMENT:  Active Problems:    Trauma    Closed fracture of multiple ribs of left side    Hemopneumothorax on left    Closed fracture of left scapula  Resolved Problems:    * No resolved hospital problems.  *       PLAN:  Left hemopneumothorax--CXR reveiwed  No ptx on water seal  DC chest tube today  Multiple rib fx--pain control/ pulmonary toilet  General diet  Left scapula fx--NWB LUE/   DVT Proph: SCDS/ heparin tid     PT OT   AR pending    Funmi Cabrera MD, FACS  5/4/2019  8:48 AM

## 2019-05-05 PROCEDURE — 6360000002 HC RX W HCPCS: Performed by: STUDENT IN AN ORGANIZED HEALTH CARE EDUCATION/TRAINING PROGRAM

## 2019-05-05 PROCEDURE — 2580000003 HC RX 258: Performed by: SURGERY

## 2019-05-05 PROCEDURE — 2060000000 HC ICU INTERMEDIATE R&B

## 2019-05-05 PROCEDURE — 6370000000 HC RX 637 (ALT 250 FOR IP): Performed by: SURGERY

## 2019-05-05 PROCEDURE — 99232 SBSQ HOSP IP/OBS MODERATE 35: CPT | Performed by: SURGERY

## 2019-05-05 PROCEDURE — 6370000000 HC RX 637 (ALT 250 FOR IP): Performed by: STUDENT IN AN ORGANIZED HEALTH CARE EDUCATION/TRAINING PROGRAM

## 2019-05-05 RX ORDER — FINASTERIDE 5 MG/1
5 TABLET, FILM COATED ORAL
Status: DISCONTINUED | OUTPATIENT
Start: 2019-05-06 | End: 2019-05-06 | Stop reason: HOSPADM

## 2019-05-05 RX ORDER — CARBAMAZEPINE 200 MG/1
200 TABLET ORAL
Status: DISCONTINUED | OUTPATIENT
Start: 2019-05-06 | End: 2019-05-06 | Stop reason: HOSPADM

## 2019-05-05 RX ORDER — TAMSULOSIN HYDROCHLORIDE 0.4 MG/1
0.4 CAPSULE ORAL
Status: DISCONTINUED | OUTPATIENT
Start: 2019-05-05 | End: 2019-05-06 | Stop reason: HOSPADM

## 2019-05-05 RX ORDER — SENNA PLUS 8.6 MG/1
1 TABLET ORAL NIGHTLY
Status: DISCONTINUED | OUTPATIENT
Start: 2019-05-05 | End: 2019-05-06 | Stop reason: HOSPADM

## 2019-05-05 RX ORDER — POLYETHYLENE GLYCOL 3350 17 G/17G
17 POWDER, FOR SOLUTION ORAL 2 TIMES DAILY
Status: DISCONTINUED | OUTPATIENT
Start: 2019-05-05 | End: 2019-05-06 | Stop reason: HOSPADM

## 2019-05-05 RX ORDER — METOPROLOL SUCCINATE 25 MG/1
12.5 TABLET, EXTENDED RELEASE ORAL
Status: DISCONTINUED | OUTPATIENT
Start: 2019-05-06 | End: 2019-05-06 | Stop reason: HOSPADM

## 2019-05-05 RX ADMIN — DOCUSATE SODIUM 100 MG: 100 CAPSULE, LIQUID FILLED ORAL at 20:59

## 2019-05-05 RX ADMIN — METHOCARBAMOL TABLETS 500 MG: 500 TABLET, COATED ORAL at 16:43

## 2019-05-05 RX ADMIN — METHOCARBAMOL TABLETS 500 MG: 500 TABLET, COATED ORAL at 08:32

## 2019-05-05 RX ADMIN — HEPARIN SODIUM 5000 UNITS: 10000 INJECTION INTRAVENOUS; SUBCUTANEOUS at 20:58

## 2019-05-05 RX ADMIN — METHOCARBAMOL TABLETS 500 MG: 500 TABLET, COATED ORAL at 12:48

## 2019-05-05 RX ADMIN — POLYETHYLENE GLYCOL 3350 17 G: 17 POWDER, FOR SOLUTION ORAL at 20:59

## 2019-05-05 RX ADMIN — Medication 10 ML: at 09:00

## 2019-05-05 RX ADMIN — POLYETHYLENE GLYCOL 3350 17 G: 17 POWDER, FOR SOLUTION ORAL at 08:32

## 2019-05-05 RX ADMIN — TAMSULOSIN HYDROCHLORIDE 0.4 MG: 0.4 CAPSULE ORAL at 16:43

## 2019-05-05 RX ADMIN — DOCUSATE SODIUM 100 MG: 100 CAPSULE, LIQUID FILLED ORAL at 08:32

## 2019-05-05 RX ADMIN — METHOCARBAMOL TABLETS 500 MG: 500 TABLET, COATED ORAL at 20:59

## 2019-05-05 RX ADMIN — HEPARIN SODIUM 5000 UNITS: 10000 INJECTION INTRAVENOUS; SUBCUTANEOUS at 12:48

## 2019-05-05 RX ADMIN — HEPARIN SODIUM 5000 UNITS: 10000 INJECTION INTRAVENOUS; SUBCUTANEOUS at 05:25

## 2019-05-05 RX ADMIN — SENNOSIDES 8.6 MG: 8.6 TABLET, FILM COATED ORAL at 20:59

## 2019-05-05 RX ADMIN — Medication 10 ML: at 21:17

## 2019-05-05 ASSESSMENT — PAIN SCALES - GENERAL
PAINLEVEL_OUTOF10: 0
PAINLEVEL_OUTOF10: 0

## 2019-05-05 NOTE — PLAN OF CARE
Problem: Falls - Risk of:  Goal: Will remain free from falls  Description  Will remain free from falls    Outcome: Met This Shift     Problem: Pain:  Goal: Pain level will decrease  Description  Pain level will decrease  Outcome: Met This Shift

## 2019-05-05 NOTE — PROGRESS NOTES
[facility]  ATTENDING PHYSICIAN PROGRESS NOTE     I have examined the patient and  reviewed the record. I have reviewed all relevant labs and imaging data. The following summarizes my clinical findings and independent assessment. CC: fall    S. Chest tube removed yesterday. CXR stable    O.  Vitals:    05/05/19 0715   BP: 133/72   Pulse: 97   Resp: 25   Temp: 98.5 °F (36.9 °C)   SpO2: 94%     PHYSICAL EXAM   PSYCH: mood and affect normal, alert and oriented x 3  CONSTITUTIONAL: No apparent distress, comfortable  EYES: Sclera white, pupils equal round and reactive to light  ENMT:  Hearing normal, trachea midline, ears externally intact  RESP: Breath sounds were clear and equal with no rales, wheezes, or rhonchi. Respiratory effort was normal with no retractions or use of accessory muscles. CV: Heart sounds were normal with a regular rate and rhythm. No pedal edema  GI/ Abdomen: The abdomen was soft and non distended. There was no tenderness, guarding, rebound, or rigidity. There was no                     masses, hepatosplenomegaly, or hernias. ASSESSMENT:  Active Problems:    Trauma    Closed fracture of multiple ribs of left side    Hemopneumothorax on left    Closed fracture of left scapula  Resolved Problems:    * No resolved hospital problems.  *       PLAN:  Left hemopneumothorax--Resolved  Multiple rib fx--pain control/ pulmonary toilet  General diet  Left scapula fx--NWB LUE/   DVT Proph: SCDS/ heparin tid     PT OT   AR pending    Tasha Sewell MD, FACS  5/5/2019  1:17 PM

## 2019-05-06 ENCOUNTER — HOSPITAL ENCOUNTER (INPATIENT)
Age: 84
LOS: 10 days | Discharge: HOME HEALTH | DRG: 561 | End: 2019-05-16
Attending: PHYSICAL MEDICINE & REHABILITATION | Admitting: PHYSICAL MEDICINE & REHABILITATION
Payer: MEDICARE

## 2019-05-06 VITALS
HEART RATE: 92 BPM | OXYGEN SATURATION: 94 % | HEIGHT: 72 IN | DIASTOLIC BLOOD PRESSURE: 77 MMHG | TEMPERATURE: 98.2 F | WEIGHT: 170 LBS | SYSTOLIC BLOOD PRESSURE: 133 MMHG | RESPIRATION RATE: 18 BRPM | BODY MASS INDEX: 23.03 KG/M2

## 2019-05-06 PROBLEM — T07.XXXA MULTIPLE FRACTURES: Status: ACTIVE | Noted: 2019-05-06

## 2019-05-06 PROCEDURE — 97530 THERAPEUTIC ACTIVITIES: CPT

## 2019-05-06 PROCEDURE — 6370000000 HC RX 637 (ALT 250 FOR IP): Performed by: SURGERY

## 2019-05-06 PROCEDURE — 6360000002 HC RX W HCPCS: Performed by: STUDENT IN AN ORGANIZED HEALTH CARE EDUCATION/TRAINING PROGRAM

## 2019-05-06 PROCEDURE — 2580000003 HC RX 258: Performed by: SURGERY

## 2019-05-06 PROCEDURE — 6370000000 HC RX 637 (ALT 250 FOR IP): Performed by: STUDENT IN AN ORGANIZED HEALTH CARE EDUCATION/TRAINING PROGRAM

## 2019-05-06 PROCEDURE — 99238 HOSP IP/OBS DSCHRG MGMT 30/<: CPT | Performed by: SURGERY

## 2019-05-06 PROCEDURE — 1280000000 HC REHAB R&B

## 2019-05-06 RX ORDER — METHOCARBAMOL 500 MG/1
500 TABLET, FILM COATED ORAL 4 TIMES DAILY
Status: CANCELLED | OUTPATIENT
Start: 2019-05-06

## 2019-05-06 RX ORDER — FINASTERIDE 5 MG/1
5 TABLET, FILM COATED ORAL
Status: CANCELLED | OUTPATIENT
Start: 2019-05-07

## 2019-05-06 RX ORDER — DOCUSATE SODIUM 100 MG/1
100 CAPSULE, LIQUID FILLED ORAL 2 TIMES DAILY
Status: CANCELLED | OUTPATIENT
Start: 2019-05-06

## 2019-05-06 RX ORDER — METOPROLOL SUCCINATE 25 MG/1
12.5 TABLET, EXTENDED RELEASE ORAL
Status: CANCELLED | OUTPATIENT
Start: 2019-05-07

## 2019-05-06 RX ORDER — CARBAMAZEPINE 200 MG/1
200 TABLET ORAL
Status: CANCELLED | OUTPATIENT
Start: 2019-05-07

## 2019-05-06 RX ORDER — ACETAMINOPHEN 325 MG/1
650 TABLET ORAL EVERY 4 HOURS PRN
Status: DISCONTINUED | OUTPATIENT
Start: 2019-05-06 | End: 2019-05-06 | Stop reason: SDUPTHER

## 2019-05-06 RX ORDER — TAMSULOSIN HYDROCHLORIDE 0.4 MG/1
0.4 CAPSULE ORAL
Status: CANCELLED | OUTPATIENT
Start: 2019-05-06

## 2019-05-06 RX ORDER — METHOCARBAMOL 500 MG/1
500 TABLET, FILM COATED ORAL 4 TIMES DAILY
Status: DISCONTINUED | OUTPATIENT
Start: 2019-05-06 | End: 2019-05-07

## 2019-05-06 RX ORDER — ACETAMINOPHEN 325 MG/1
650 TABLET ORAL EVERY 4 HOURS PRN
Status: DISCONTINUED | OUTPATIENT
Start: 2019-05-06 | End: 2019-05-16 | Stop reason: HOSPADM

## 2019-05-06 RX ORDER — CARBAMAZEPINE 200 MG/1
200 TABLET ORAL
Status: DISCONTINUED | OUTPATIENT
Start: 2019-05-07 | End: 2019-05-16 | Stop reason: HOSPADM

## 2019-05-06 RX ORDER — METOPROLOL SUCCINATE 25 MG/1
12.5 TABLET, EXTENDED RELEASE ORAL
Status: DISCONTINUED | OUTPATIENT
Start: 2019-05-07 | End: 2019-05-16 | Stop reason: HOSPADM

## 2019-05-06 RX ORDER — TAMSULOSIN HYDROCHLORIDE 0.4 MG/1
0.4 CAPSULE ORAL
Status: DISCONTINUED | OUTPATIENT
Start: 2019-05-06 | End: 2019-05-16 | Stop reason: HOSPADM

## 2019-05-06 RX ORDER — FINASTERIDE 5 MG/1
5 TABLET, FILM COATED ORAL
Status: DISCONTINUED | OUTPATIENT
Start: 2019-05-07 | End: 2019-05-16 | Stop reason: HOSPADM

## 2019-05-06 RX ORDER — DOCUSATE SODIUM 100 MG/1
100 CAPSULE, LIQUID FILLED ORAL 2 TIMES DAILY
Status: DISCONTINUED | OUTPATIENT
Start: 2019-05-06 | End: 2019-05-16 | Stop reason: HOSPADM

## 2019-05-06 RX ORDER — ACETAMINOPHEN 325 MG/1
650 TABLET ORAL EVERY 4 HOURS PRN
Status: CANCELLED | OUTPATIENT
Start: 2019-05-06

## 2019-05-06 RX ADMIN — METOPROLOL SUCCINATE 12.5 MG: 25 TABLET, EXTENDED RELEASE ORAL at 06:06

## 2019-05-06 RX ADMIN — Medication 10 ML: at 08:42

## 2019-05-06 RX ADMIN — CARBAMAZEPINE 200 MG: 200 TABLET ORAL at 06:06

## 2019-05-06 RX ADMIN — DOCUSATE SODIUM 100 MG: 100 CAPSULE, LIQUID FILLED ORAL at 08:41

## 2019-05-06 RX ADMIN — TAMSULOSIN HYDROCHLORIDE 0.4 MG: 0.4 CAPSULE ORAL at 16:57

## 2019-05-06 RX ADMIN — DOCUSATE SODIUM 100 MG: 100 CAPSULE, LIQUID FILLED ORAL at 21:28

## 2019-05-06 RX ADMIN — FINASTERIDE 5 MG: 5 TABLET, FILM COATED ORAL at 06:06

## 2019-05-06 RX ADMIN — POLYETHYLENE GLYCOL 3350 17 G: 17 POWDER, FOR SOLUTION ORAL at 08:42

## 2019-05-06 RX ADMIN — HEPARIN SODIUM 5000 UNITS: 10000 INJECTION INTRAVENOUS; SUBCUTANEOUS at 06:07

## 2019-05-06 RX ADMIN — METHOCARBAMOL TABLETS 500 MG: 500 TABLET, COATED ORAL at 13:05

## 2019-05-06 RX ADMIN — METHOCARBAMOL TABLETS 500 MG: 500 TABLET, COATED ORAL at 08:41

## 2019-05-06 ASSESSMENT — PAIN SCALES - GENERAL
PAINLEVEL_OUTOF10: 0
PAINLEVEL_OUTOF10: 4
PAINLEVEL_OUTOF10: 0
PAINLEVEL_OUTOF10: 0
PAINLEVEL_OUTOF10: 2

## 2019-05-06 ASSESSMENT — PAIN DESCRIPTION - LOCATION: LOCATION: ARM;SHOULDER

## 2019-05-06 ASSESSMENT — PAIN DESCRIPTION - PAIN TYPE: TYPE: ACUTE PAIN

## 2019-05-06 ASSESSMENT — PAIN DESCRIPTION - FREQUENCY: FREQUENCY: INTERMITTENT

## 2019-05-06 ASSESSMENT — PAIN DESCRIPTION - ORIENTATION: ORIENTATION: LEFT

## 2019-05-06 ASSESSMENT — PAIN DESCRIPTION - DESCRIPTORS: DESCRIPTORS: SORE

## 2019-05-06 NOTE — PROGRESS NOTES
Patient oriented to room and new admission folder given. Patient Guide reviewed and explanation of Patient Rights and Responsibilities completed. I gave a Signed copy of The Important Message From Medicare to patient.  Ignacio Rodriguez  5/6/2019  2:45 PM

## 2019-05-06 NOTE — PROGRESS NOTES
Physical Therapy  Facility/Department: [address] PICU  Daily Treatment Note    NAME: Allyssa Green  : 1922  MRN: [id number]    Date of Service: 2019    Evaluating Therapist: Soila Walker PT, DPT    Room #: 9026P  DIAGNOSIS: Trauma  PRECAUTIONS: Falls, , NWB LUE, sling for comfort LUE  S/p fall at home with comminuted L scapula fx, L humeral head fx, L lateral 7th rib fx, L pneumothorax    Social:  Pt lives alone in a 1 floor plan condo with no step(s) and no rail(s) to enter. Prior to admission pt walked with a Hurrycane. Reported independent with ADLs/IADLs. Actively driving. Initial Evaluation  Date: 19 Treatment  Date: 19   Short Term/ Long Term   Goals   Was pt agreeable to Eval/treatment? Yes  Yes    Does pt have pain? Reported 10 in L rib/CT site. Nurse notified of patient needs. Reported L shoulder discomfort. Did not quantify. Bed Mobility  Rolling: Min S  Supine to sit: Min A  Sit to supine: Mind A  Scooting: Min A Rolling: Min A  Supine to sit: Min A  Sit to supine: NT  Scooting: SBA to EOB  Supervision   Transfers Sit to stand: Mod A +1-2  Stand to sit: Mod A +1-2  Stand pivot: Mod A +1-2 Sit to stand: Min A  Stand to sit: Min A  Stand pivot: Min A  SBA   Ambulation    15 feet, 8 feet with no AD with Mod A +2 50 feet x 2 with LBQC with Min/Mod A >150 feet with AAD with  SBA   Stair negotiation: ascended and descended  NT NT 4 steps with 1 rail with Min A   AM-PAC Score  16      Pt is alert and Oriented x 4. Patient required verbal cues for maintaining WB restrictions in LUE during transfers. Patient somewhat impulsive with transfers. Balance: Seated: Supervision sitting EOB; Standing: Min/Mod A using LBQC  Sensation: chronic numbness/tingling in feet  Edema: None noted  Endurance: Fair  Bed/Chair alarm: No alarm set upon arrival      Comments/Treatment:  Patient found in high Rebollar's and reported feeling \"mezza mezza\".  Patient had no definitive c/o, just reported \"feeling old and weak\". Patient impulsive with mobility and required verbal cues for WB restrictions and proper use of QC. Patient assisted on/off the commode and required assist with doffing/donning brief. Patient abandoned the cane prior to turning to the sink and education provided. Patient with good static balance with wide MADDI. Patient moderately unsteady with gait initially with poor balance and sequencing of cane, especially on turns and required increased assist for safety. Patient demonstrated forward flexed posture, slower sergio, with wide MADDI during gait. Gait quality/safety improved once ambulating in straight line. Patient fatigued quickly and mildly SOB during gait assessment. Patient returned to the room and stood at the chair while changing gowns and demonstrated good static balance. Patient assisted to seated in bedside chair with call light and tray table in reach. Patient education  Pt educated on importance of mobility, safety with mobility, transfers, WB restrictions, gait    Patient response to education:   Pt verbalized understanding Pt demonstrated skill Pt requires further education in this area   Yes  Yes with verbal cues and assist Yes      Patient is making good progress towards established PT goals and goals have been adjusted as appropriate. Will continue with current POC.       Time in: 0436  Time out: [address] [facility], [address] [facility], [facility]  License [id number]

## 2019-05-06 NOTE — LETTER
DATE: 5/14/2019        Mr. [employer] available in your area  [address] [facility]  [place], [address]  [phone]  Activities include: ceramics, aerobics, walkers club, and fitness center. [address]  [place], [address]  [phone] opportunities, programs and prescription assistance   [facility]  [address], [facility] [address]  [phone], line dancing, chair yoga,   dance exercise classes, painting, gardening groups and more. [facility]  [address] [facility], [facility]  [phone]  Socialization opportunities, exercise and wellness classes, crafts, and computer classes. [facility]  [address], [address]  [phone]  Senior group meets on Mondays and Wednesdays from [address] [facility]. Activities include: discussions, crafts, guest speakers, cards and films. [address] [place], [place] [address]  [phone]  Activities such as cards, bowling and occasional bus trips. [facility]  [address] [facility], [address]  [phone]  Weekly programs including painting, dancing, exercise, computer classes,   crafts and theater. [address] [facility] 30 [facility],[address], [address]  [phone]  Meetings 9AM-2PM on Thursdays to socialize and play cards. [facility]  [address] [facility]  [phone]  Exercise equipment, water exercise classes,  indoor and outdoor pool. [employer] [facility] [place]  [address] [facility], [address]  [phone]  Activities include: bus trips, fitness programs, arts, crafts, dinners and card games. [employer] [facility] [address] [facility], [address]  [address]  [place], [address]  [phone] Water exercise programs, different exercise equipment, indoor pool. [address] at the . [address] [facility]. [address]  [phone]  Free health screenings, health education, health information on community resources, fitness classes & fitness center. [address] [facility]  [address], [address] [place] [address]  [phone]  8AM-4PM daily with noon meal.  Activities include arts, crafts, knitting and juan [facility]  [address]  [phone]  Low level fitness classes and warm water arthritis classes. [facility]  [address], [address] [place] [address]  [phone]  Group meets at 12PM on Mondays, Wednesdays and Fridays. Activities include bingo, lunch and special programs.

## 2019-05-06 NOTE — PROGRESS NOTES
[facility]  Daily Trauma Progress Note  Attending      Admit Date: 5/1/2019    Hospital day 5    Fall SH    INJURIES:   Patient Active Problem List   Diagnosis    Syncope and collapse    Essential hypertension    Seizure disorder (HCC)    Trauma    Closed fracture of multiple ribs of left side    Hemopneumothorax on left    Closed fracture of left scapula         PREVIOUS 24 HOUR EVENTS: no new issues    Data Review  Data  CBC with Differential:    Lab Results   Component Value Date    WBC 7.9 05/04/2019    RBC 4.03 05/04/2019    HGB 13.0 05/04/2019    HCT 38.6 05/04/2019     05/04/2019    MCV 95.8 05/04/2019    MCH 32.3 05/04/2019    MCHC 33.7 05/04/2019    RDW 13.9 05/04/2019    SEGSPCT 89 10/27/2010    LYMPHOPCT 35.5 05/04/2019    MONOPCT 9.6 05/04/2019    BASOPCT 0.4 05/04/2019    MONOSABS 0.75 05/04/2019    LYMPHSABS 2.79 05/04/2019    EOSABS 0.27 05/04/2019    BASOSABS 0.03 05/04/2019     CMP:    Lab Results   Component Value Date     05/04/2019    K 4.2 05/04/2019     05/04/2019    CO2 22 05/04/2019    BUN 25 05/04/2019    CREATININE 0.8 05/04/2019    GFRAA >60 05/04/2019    LABGLOM >60 05/04/2019    GLUCOSE 121 05/04/2019    GLUCOSE 161 10/27/2010    PROT 6.1 05/04/2019    LABALBU 3.4 05/04/2019    CALCIUM 8.9 05/04/2019    BILITOT 0.7 05/04/2019    ALKPHOS 68 05/04/2019    AST 22 05/04/2019    ALT 13 05/04/2019     Radiology: reviewed    MEDICATIONS:   Scheduled Meds:  Continuous Infusions:  PRN Meds:    Subjective:     Sleepy, but no other issues    Objective:   No data found. I/O last 3 completed shifts: In: 5 [P.O.:420]  Out: -   No intake/output data recorded. PHYSICAL:  General appearance:  Comfortable. Pain Description: none  GCS:    4 - Opens eyes on own   6 - Follows simple motor commands  5 - Alert and oriented  HEENT:  Eyes clear. PERRLA. Chest: Clear to ausculation bilaterally.   SMI 1000  CV:  RRR  Abdomen:  SNTND +BS  Extremities:  BRITTON x

## 2019-05-06 NOTE — DISCHARGE INSTR - COC
Continuity of Care Form    Patient Name: John Cameron   :  1922  MRN:  [id number]    Admit date:  2019  Discharge date:  19    Code Status Order: Full Code   Advance Directives:   Advance Care Flowsheet Documentation     Date/Time Healthcare Directive Type of Healthcare Directive Copy in [address] Agent's Name Healthcare Agent's Phone Number    19 0014  No, patient does not have an advance directive for healthcare treatment -- -- -- -- --          Admitting Physician:  Willard Levy MD  PCP: Brady Eng MD    Discharging Nurse: [facility] Unit/Room#: 1900/4159-K  Discharging Unit Phone Number: [phone]    Emergency Contact:   Extended Emergency Contact Information  Primary Emergency Contact: Guido  Phone: [phone]  Mobile Phone: [phone]  Relation: Child   needed? No  Secondary Emergency Contact: balbir perez  [place] Phone: [phone]  Mobile Phone: [phone]  Relation: Child   needed? No    Past Surgical History:  Past Surgical History:   Procedure Laterality Date    HERNIA REPAIR      HIP SURGERY Right 2017    ORIF right hip  Dr. Brandy Frausto       Immunization History: There is no immunization history on file for this patient.     Active Problems:  Patient Active Problem List   Diagnosis Code    Syncope and collapse R55    Essential hypertension I10    Seizure disorder ([facility] [address] G40.909    Trauma T14.90XA    Closed fracture of multiple ribs of left side S22.42XA    Hemopneumothorax on left J94.2    Closed fracture of left scapula S42.102A       Isolation/Infection:   Isolation          No Isolation            Nurse Assessment:  Last Vital Signs: /77   Pulse 92   Temp 98.2 °F (36.8 °C) (Temporal)   Resp 18   Ht 6' (1.829 m)   Wt 170 lb (77.1 kg)   SpO2 94%   BMI 23.06 kg/m²     Last documented pain score (0-10 scale): Pain Level: 2  Last Weight:   Wt Readings from Last 1 Encounters:   05/01/19 170 lb (77.1 kg)     Mental Status:  oriented, alert, coherent, logical, thought processes intact and able to concentrate and follow conversation    IV Access:  - None    Nursing Mobility/ADLs:  Walking   Assisted  Transfer  Assisted  Bathing  Assisted  Dressing  Assisted  Toileting  Assisted  Feeding  Independent  [facility] [place]  13. Delivery   whole    Wound Care Documentation and Therapy:  Wound 12/07/17 Skin tear Elbow Right (Active)   Number of days: 514       Incision 12/07/17 Hip Right (Active)   Number of days: 514        Elimination:  Continence:   · Bowel: Yes  · Bladder: occasional incontinence   Urinary Catheter: None   Colostomy/Ileostomy/Ileal Conduit: No       Date of Last BM: 5/6/19    Intake/Output Summary (Last 24 hours) at 5/6/2019 1252  Last data filed at 5/6/2019 0830  Gross per 24 hour   Intake 670 ml   Output --   Net 670 ml     I/O last 3 completed shifts: In: 56 [P.O.:890]  Out: -     Safety Concerns:     History of Falls (last 30 days) and At Risk for Falls    Impairments/Disabilities:      None    Nutrition Therapy:  Current Nutrition Therapy:   - Oral Diet:  General    Routes of Feeding: Oral  Liquids: No Restrictions  Daily Fluid Restriction: no  Last Modified Barium Swallow with Video (Video Swallowing Test): not done    Treatments at the Time of Hospital Discharge:   Respiratory Treatments: None   Oxygen Therapy:  is not on home oxygen therapy.   Ventilator:    - No ventilator support    Rehab Therapies: Physical Therapy and Occupational Therapy  Weight Bearing Status/Restrictions: Non weight bearing LUE; LUE in sling   Other Medical Equipment (for information only, NOT a DME order):  cane  Other Treatments:     Patient's personal belongings (please select all that are sent with patient):  Katie    RN SIGNATURE:  Electronically signed by Geni Hastings RN on 5/6/19 at 1:12 PM    CASE MANAGEMENT/SOCIAL WORK SECTION    Inpatient Status

## 2019-05-06 NOTE — PROGRESS NOTES
Assessment:    Initial Eval Status  Date: 5-2-19 Treatment Status  Date: 5/6/19 Short Term Goals  Treatment frequency: PRN 2-4 x/week   Feeding Min A/Set up     Able to feed self w/ utensils/drink from cup w/ R UE, Required Assist to cut food, open containers d/t precautions for limited ROM/use of L UE Set up  Mod I   Grooming Min A/Set up     Required Min A/Set up to complete simple grooming tasks while seated EOB, unsafe to stand at sink d/t limited endurance/dizziness /impulsiveness Min A  To wash hands while Standing at sink. Close SUP  Standing At The Sink   UB Dressing Max A/Set up     Required Max A to don/doff hospital gown, sling w/ Mod-Max VCs for safe tech seated EOB Max A  To don/doff gown and sling while standing. Mod A/Set up   LB Dressing DEP     Max A to don/doff socks/slippers/thread feet into pants seated EOB, Mod A of 1 for standing balance + Mod A of 1 for clothing adjustment  Min A  To don/doff socks while seated EOB. Min A for standing. Cuing to maintain precautions. Max A   Bathing NT       N/T  Mod A   Toileting DEP     Mod A of 1 for standing balance, Mod A of 1 for clothing adjustment, able to complete own hygiene Min A- toilet transfer  Using grab bar and QC Mod A   Bed Mobility  Rolling: Mod A  Repositioning:  Min A/Mod VCs   Supine to Sit:  NT    Sit to Supine:  Min A/Mod VCs     Pt ed for log-rolling tech  Sit to Supine:  Min A  Per last tx  Min A   Functional Transfers Sit <> stand:  DEP   Mod A of 1-2 for safety/line mgmt      From EOB 2x, Commode 1x, pt ed for safety/hand placement Min A- sit<->stand  Cuing on hand placement  Mod A   Functional Mobility DEP     Mod A of 2 for safety and line mgmt ~ 15 mins + 10' w/ seated rest break b/t trials, pt ed/Max VCs for safety Min A  Short home distance using QC.  Mod A   Balance Sitting:  Close SUP     Static:  Good (-) - Close SUP at EOB    Dynamic:  Fair(+) w/ at EOB/commode     Standing:   Mod A of 2    Static:  Fair (+) - Min A of 2 Dynamic:  Mod A of 2 Sitting:      Static:  Supervision    Dynamic: SBA     Standing:     Static:  Min A    Dynamic:  Min A       Activity Tolerance Tolerated Sitting:  EOB > 15 mins w/ ax, Commode ~ 10 mins w/ ax  Tolerated Standing:  ~ 3 mins + 2 mins Fair      Visual/  Perceptual WFL  Glasses: Yes        Hearing WFL  Hearing Aids  No         Comments: Upon arrival pt seated EOB. Pt educated on techniques to increase independence and safety during ADL's and functional transfers. At end of session pt left seated in bedside chair, call light within reach. · Pt has made fair progress towards set goals.      · Continue with current plan of care    Time in: 8:50  Time out: 9:08  Total Tx Time: 18  mins    Donaldo Sung

## 2019-05-06 NOTE — CARE COORDINATION
Patient accepted at ARU at [facility]. Bed assigned 5516B. Herb Pan Finely notified of discharge and room assigned.

## 2019-05-07 LAB
ANION GAP SERPL CALCULATED.3IONS-SCNC: 9 MMOL/L (ref 7–16)
BASOPHILS ABSOLUTE: 0.02 E9/L (ref 0–0.2)
BASOPHILS RELATIVE PERCENT: 0.3 % (ref 0–2)
BUN BLDV-MCNC: 24 MG/DL (ref 8–23)
CALCIUM SERPL-MCNC: 8.9 MG/DL (ref 8.6–10.2)
CHLORIDE BLD-SCNC: 108 MMOL/L (ref 98–107)
CO2: 25 MMOL/L (ref 22–29)
CREAT SERPL-MCNC: 0.8 MG/DL (ref 0.7–1.2)
EOSINOPHILS ABSOLUTE: 0.28 E9/L (ref 0.05–0.5)
EOSINOPHILS RELATIVE PERCENT: 4.8 % (ref 0–6)
GFR AFRICAN AMERICAN: >60
GFR NON-AFRICAN AMERICAN: >60 ML/MIN/1.73
GLUCOSE BLD-MCNC: 108 MG/DL (ref 74–99)
HCT VFR BLD CALC: 35.2 % (ref 37–54)
HEMOGLOBIN: 11.8 G/DL (ref 12.5–16.5)
IMMATURE GRANULOCYTES #: 0.03 E9/L
IMMATURE GRANULOCYTES %: 0.5 % (ref 0–5)
LYMPHOCYTES ABSOLUTE: 1.78 E9/L (ref 1.5–4)
LYMPHOCYTES RELATIVE PERCENT: 30.3 % (ref 20–42)
MCH RBC QN AUTO: 32.2 PG (ref 26–35)
MCHC RBC AUTO-ENTMCNC: 33.5 % (ref 32–34.5)
MCV RBC AUTO: 96.2 FL (ref 80–99.9)
MONOCYTES ABSOLUTE: 0.55 E9/L (ref 0.1–0.95)
MONOCYTES RELATIVE PERCENT: 9.4 % (ref 2–12)
NEUTROPHILS ABSOLUTE: 3.22 E9/L (ref 1.8–7.3)
NEUTROPHILS RELATIVE PERCENT: 54.7 % (ref 43–80)
PDW BLD-RTO: 13.6 FL (ref 11.5–15)
PLATELET # BLD: 206 E9/L (ref 130–450)
PMV BLD AUTO: 9.5 FL (ref 7–12)
POTASSIUM REFLEX MAGNESIUM: 4.6 MMOL/L (ref 3.5–5)
RBC # BLD: 3.66 E12/L (ref 3.8–5.8)
SODIUM BLD-SCNC: 142 MMOL/L (ref 132–146)
WBC # BLD: 5.9 E9/L (ref 4.5–11.5)

## 2019-05-07 PROCEDURE — 97530 THERAPEUTIC ACTIVITIES: CPT

## 2019-05-07 PROCEDURE — 80048 BASIC METABOLIC PNL TOTAL CA: CPT

## 2019-05-07 PROCEDURE — 85025 COMPLETE CBC W/AUTO DIFF WBC: CPT

## 2019-05-07 PROCEDURE — 97535 SELF CARE MNGMENT TRAINING: CPT

## 2019-05-07 PROCEDURE — 36415 COLL VENOUS BLD VENIPUNCTURE: CPT

## 2019-05-07 PROCEDURE — 97165 OT EVAL LOW COMPLEX 30 MIN: CPT

## 2019-05-07 PROCEDURE — 1280000000 HC REHAB R&B

## 2019-05-07 PROCEDURE — 6370000000 HC RX 637 (ALT 250 FOR IP): Performed by: PHYSICAL MEDICINE & REHABILITATION

## 2019-05-07 PROCEDURE — 6370000000 HC RX 637 (ALT 250 FOR IP): Performed by: STUDENT IN AN ORGANIZED HEALTH CARE EDUCATION/TRAINING PROGRAM

## 2019-05-07 PROCEDURE — 97161 PT EVAL LOW COMPLEX 20 MIN: CPT

## 2019-05-07 RX ORDER — METHOCARBAMOL 500 MG/1
500 TABLET, FILM COATED ORAL 4 TIMES DAILY PRN
Status: DISCONTINUED | OUTPATIENT
Start: 2019-05-07 | End: 2019-05-16 | Stop reason: HOSPADM

## 2019-05-07 RX ADMIN — TAMSULOSIN HYDROCHLORIDE 0.4 MG: 0.4 CAPSULE ORAL at 16:51

## 2019-05-07 RX ADMIN — ACETAMINOPHEN 650 MG: 325 TABLET, FILM COATED ORAL at 21:32

## 2019-05-07 RX ADMIN — DOCUSATE SODIUM 100 MG: 100 CAPSULE, LIQUID FILLED ORAL at 21:32

## 2019-05-07 RX ADMIN — FINASTERIDE 5 MG: 5 TABLET, FILM COATED ORAL at 06:21

## 2019-05-07 RX ADMIN — DOCUSATE SODIUM 100 MG: 100 CAPSULE, LIQUID FILLED ORAL at 09:37

## 2019-05-07 RX ADMIN — METOPROLOL SUCCINATE 12.5 MG: 25 TABLET, FILM COATED, EXTENDED RELEASE ORAL at 09:37

## 2019-05-07 RX ADMIN — CARBAMAZEPINE 200 MG: 200 TABLET ORAL at 06:21

## 2019-05-07 RX ADMIN — METOPROLOL SUCCINATE 12.5 MG: 25 TABLET, FILM COATED, EXTENDED RELEASE ORAL at 06:21

## 2019-05-07 ASSESSMENT — PAIN SCALES - GENERAL
PAINLEVEL_OUTOF10: 0
PAINLEVEL_OUTOF10: 0
PAINLEVEL_OUTOF10: 2
PAINLEVEL_OUTOF10: 0

## 2019-05-07 NOTE — PROGRESS NOTES
Occupational Therapy   Occupational Therapy Initial Assessment  Date: 2019   Patient Name: Chicho Levy  MRN: [id number]     : 1922  Room: 5516A    Referring Practitioner: Yvan Perla MD  Diagnosis: s/p fall- rib fx L, L pneumothorax, L scapula fx  Additional Pertinent Hx: HTN, hx R ORIF- 2017 & seizures    Date of Service: 2019    Discharge Recommendations:  Home with assist PRN, Outpatient OT      Restrictions   Fall Risk, Seizure, NWB LUE, sling LUE    Subjective   Chart Reviewed: Yes  Family / Caregiver Present: No  Subjective: Pt presents supine in bed & was agreeable to OT intervention.    General Comment  Comments: Pt did c/o L scapula pain during ADL     Social/Functional History  Lives With: Alone  Type of Home: (condo)  Home Layout: One level  Home Access: Level entry  Bathroom Shower/Tub: Tub/Shower unit(Pt reports he soaks because he has a hx of Shingles 2 years ago & is still hypersensitive to water spray on his back)  Bathroom Toilet: Standard  Home Equipment: (hurry cane)  ADL Assistance: Independent  Ambulation Assistance: Independent  Transfer Assistance: Independent  Active : Yes  Mode of Transportation: Car  Occupation: Retired  Leisure & Hobbies: bridge  IADL Comments: PLOF pt independent in all areas & has a cleaning lady once a month & someone who cooks for him     Objective   Vision: Impaired  Vision Exceptions: Wears glasses at all times  Hearing: Within functional limits      Orientation  Overall Orientation Status: Within Functional Limits     Observation/Palpation  Posture: Fair     Functional Mobility  Functional - Mobility Device: Cane  Activity: To/from bathroom  Assist Level: Minimal assistance  Functional Mobility Comments: vc's for safety   Toilet Transfers  Toilet - Technique: Ambulating  Equipment Used: Standard toilet  Toilet Transfer: Minimal assistance  Toilet Transfers Comments: vc's fro safety & insight     Shower Transfers  Shower - Transfer From: Transfer board  Shower - Transfer Type: To and From  Shower - Transfer To: Transfer tub bench  Shower - Technique: Stand pivot  Shower Transfers: Minimal assistance  Shower Transfers Comments: min vc's for safety     ADL  Feeding: Minimal assistance;Setup; Increased time to complete  Grooming: Minimal assistance; Increased time to complete;Verbal cueing;Setup  UE Bathing: Increased time to complete;Minimal assistance;Setup  LE Bathing: Contact guard assistance;Verbal cueing; Increased time to complete;Setup(standing in shower)  UE Dressing: Minimal assistance; Increased time to complete;Verbal cueing(vc's fro dressing technique)  LE Dressing: Minimal assistance; Increased time to complete;Verbal cueing     Coordination  Movements Are Fluid And Coordinated: No  Coordination and Movement description: Fine motor impairments; Left UE     Bed mobility  Supine to Sit: Minimal assistance  Scooting: Contact guard assistance  Transfers  Sit to stand: Contact guard assistance  Stand to sit: Contact guard assistance     Vision - Basic Assessment  Prior Vision: Wears glasses all the time  Visual History: No significant visual history  Patient Visual Report: No visual complaint reported.      Cognition  Overall Cognitive Status: Exceptions  Following Commands: (followed a 2/3 step written commmand)  Problem Solving: Assistance required to correct errors made;Assistance required to generate solutions     Sensation  Overall Sensation Status: WFL      LUE AROM: shoudler NT due to scapula fx, elbow <3/4 AROM, wrist 3/4 & able to make a gross grasp & release  RUE AROM : WFL  Right Hand AROM: WFL    LUE Strength  L Hand Grasp: 4/5  L Hand Release: 4/5  LUE Strength Comment: NT due to fx  RUE Strength  R Hand Grasp: 4+/5  R Hand Release: 4+/5  RUE Strength Comment: strength 4/5 in all planes     Hand Dominance: Right      Plan   Times per week: OT twice a day for 2 weeks to address above problem areas  Times per day: Twice a day  Current Treatment Recommendations: Gait Training, Balance Training, Patient/Caregiver Education & Training, Self-Care / ADL, Functional Mobility Training, Equipment Evaluation, Education, & procurement, Home Management Training, Endurance Training    Assessment   Performance deficits / Impairments: Decreased functional mobility ; Decreased high-level IADLs;Decreased ADL status; Decreased endurance;Decreased ROM; Decreased coordination;Decreased strength;Decreased balance  Prognosis: Good  Decision Making: Low Complexity  Patient Education: Pt educated with regards to OT process. Pt participated in OT goal planning. Pt pleasant & cooeprative throughout the OT session. REQUIRES OT FOLLOW UP: Yes  Activity Tolerance: Patient Tolerated treatment well  Safety Devices in place: Yes  Type of devices: Call light within reach      Treatment Initiated: Pt educated with regards to dressing strategies to facilitiate pt performance. Pt further educated with regards to weight bearing precautions NWB RUE during ADL. Pt education to be ongoing to ensure pt carryover     Goals ; Long term goals  Time Frame for Long term goals : 2 weeks to address abov eproblem areas  Long term goal 1: Pt demo independent to eat all meals  Long term goal 2: Pt demo Mod I grooming seated or standing  Long term goal 3: Pt demo Mod I UE/LE dress with AE as needed  Long term goal 4: Pt demo Sup to bathe @ shower level both seated & standing  Long term goal 5: Pt demo Mod I commode trf with appropriate DME to ensure pt safety  Long term goals 6: Pt demo Sup tub trf with appropriate DME to ensure pt safety  Long term goal 7: Pt demo Sup light hoemmaking activity & demo G- safety & insight  Long term goal 8: Pt demo G- endurance for a 20 minute functional activity  Patient Goals   Patient goals : \"Balance my check book & take care of myself. \"     Therapy Time   Individual Concurrent Group Co-treatment   Time In 615-OT eval  630-OT rx         Time Out 630-OT eval  715-OT rx

## 2019-05-07 NOTE — CARE COORDINATION
Per Team: ELOS: 2wks. Updated patient and son, Nirmala Godinez. Goals: Supervision/Independent. PT TBA. Patient requires verbal cues for technique, follow through, safety and insight. NWB BENITO. Recommending intermittent supervision.     DME & aftercare: TBD closer to D/C    FT: 5/15/19 am    [facility] Intern  Darci Hogan  5/7/2019

## 2019-05-07 NOTE — PROGRESS NOTES
Occupational Therapy     05/07/19 1412   Attendance   Activity Cards   Participation Active participation   [place] Demonstrates ability to complete social goals   Social Skills Cooperates with others in group activity   Leisure Education Demonstrates knowledge of benefits of leisure involvement  (Identified socializing as a benefit.)   Time Spent With Patient   Minutes 40

## 2019-05-07 NOTE — CARE COORDINATION
Social Work Assessment Summary    PCP: Dr. Madelaine Farah    Patient Resides: Alone. He has been  since [address] : He resides in a one story condo with (1) entry step (0) rail. Pt. Has access to either a tub/shower with curtain or a walk in shower. He prefers the walk in shower. Will you return to your own home? Yes. Primary Caregiver: Pt. Has (3) children; DPOA- Fleurette Deven (72) semi retired, healthy and drives. Pt's daughter in law, Theo Santos, works FT, healthy an drives. The pt. Has (2) daughters; Diandra Lew who lives in [place], [place] and Pavel Ferro who lives in [place], [place]. Level of Function PTA : Pt. Was independent prior to admission, other than ambulation,used a QCane. Pt. Also used the elevated toilet seat and shower chair. He enjoyed getting out of the house daily playing cards and visiting his lady friend, Lolita Prader, who resides at [address] [place] [address] now. 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 He was a  in [employer]. DME Pta  Janet Boyd, [employer], elevated toilet seat and shower chair. Community Agency Involvement PTA : 12/2017- [facility]). Pt. Has a /cook 3days/wk. He is a [place], not registered.     Do they have a medical profile: No    Family Teaching: to be scheduled    Strength: \"Very Independent\"    Preference: \"to get back to where I was\"      NAME RELATION HOME # WORK # CELL #   Tommie Castaneda- son   [phone]   Theo Santos DIL   [phone]            Height: 6'0  Weight: [address]  5/7/2019

## 2019-05-07 NOTE — PATIENT CARE CONFERENCE
level: min assist with seat  Short term tub/shower goal: Contact guard assist  Long term tub/shower goal: supervision      Feeding:  Current level: min assist  Short term feeding goal: supervision  Long term feeding goal: Modified independent    Grooming:   Current level: min assist  Short term grooming goal: supervision  Long term grooming goal: supervision    Bathing:  Current level: min assist sit and stand  Short term bathing goal: Contact guard assist  Long term bathing goal: supervision    Homemaking:   Current level: to be assessed    Upper body dressing:  Current level: min assist with verbal cues  Short term upper body dressing goal: supervision  Long term upper body dressing goal: independent    Lower body dressing:  Current level: min assist with verbal cues  Short term lower body dressing goal: Contact guard assist  Long term lower body dressing goal: Modified independent    Toilet transfer:   Current level: min assist  Short term toilet transfer goal: Contact guard assist  Long term toilet transfer goal: Modified independent    Other comments: non wt bear left upper      Social interaction:  Modified independent      Patient/family's personal goals: \"balance my check book and take care of myself\"  Factors supporting goal achievement:  prior independence  Factors hindering goal achievement:  non wt. bearing left upper      Discharge Plan   Estimated Length of Stay: 2 weeks            Destination: home  Services at Discharge: to be assessed  Equipment at Discharge: to be assessed      INTERDISCIPLINARY TEAM/PHYSICIAN [address]:  complete therapy evals, discuss again in 1 week      I approve the established interdisciplinary plan of care as documented within the medical record of John Cameron. Please see team conference signature page for those in attendance.     Electronically signed by Bryant Guzman RN  on 5/7/2019 at 9:14 AM

## 2019-05-07 NOTE — PROGRESS NOTES
Occupational Therapy  OCCUPATIONAL THERAPY DAILY NOTE    Date:2019  Patient Name: Madhavi Sykes  MRN: [id number]  : 1922  Room: [address] [place], [place] [address]A       Diagnosis: s/p fall- rib fx L, L pneumothorax, L scapula fx  Precautions: Fall Risk, Seizure, NWB LUE, sling LUE        Functional Assessment:   Date Status AE  Comments   Feeding 19 Minimal Assist      Grooming 19 Minimal Assist      Bathing 19 Minimal Assist   When standing in the shower    UB Dressing 19 Minimal Assist   vc's for technique   LB Dressing 19 Minimal Assist   \"   Homemaking         Functional Transfers / Balance:   Date Status DME  Comments   Sit Balance 19 Supervision     Stand Balance 19 SBA     [] Tub  [x] Shower   Transfer 19 Min A      Commode   Transfer 19 Min A     Functional   Mobility 19 Min A     Other:  Bed>WC   19   CGA    SPT     Functional Exercises / Activity:  Leisure task with focus on stand balance, endurance and stand tolerance. Pt completed X 2-3 stands with SBA and demo'd fair stand tolerance and endurance. Sensory / Neuromuscular Re-Education:      Cognitive Skills:   Status Comments   Problem   Solving fair     Memory fair     Sequencing fair     Safety fair       Visual Perception:    Education:  Pt educated on purpose of functional activity. Pt educated with regards to dressing strategies to promote pt functional independence    [] Family teach completed on:    Pain Level: Pt c/o pain in low back during static stand. Additional Notes:       Patient has made good  progress during treatment sessions toward set goals. Therapy emphasis to obtain goals: Gait Training, Balance Training, Patient/Caregiver Education & Training, Self-Care / ADL, Functional Mobility Training, Equipment Evaluation, Education, & procurement, Home Management Training, Endurance Training         [x] Continue with current OT Plan of care.   [] Prepare for Discharge     DISCHARGE RECOMMENDATIONS  Recommended DME:    Post Discharge Care:   []Home Independently  []Home with 24hr Care / Supervision []Home with Partial Supervision []Home with Home Health OT []Home with Out Pt OT []Other: ___   Comments:         Time in Time out Tx Time Breakdown  Variance:   First Session  eval+rx  [] Individual Tx-   [] Concurrent Tx -  [] Co-Tx -   [] Group Tx -   [] Time Missed -     Second Session 1:00 1:45 [x] Individual Tx- 15 Mins  [x] Concurrent Tx - 30 Mins  [] Co-Tx -   [] Group Tx -   [] Time Missed -     Third Session    [] Individual Tx-   [] Concurrent Tx -  [] Co-Tx -   [] Group Tx -   [] Time Missed -         Total Tx Time  45 Mins   Eloise Guevara PAIGE/L [id number]      I have read & agree with the above status.     Velma McDonough OTR/L [id number]

## 2019-05-07 NOTE — PROGRESS NOTES
Physical Therapy    Facility[facility]Department[facility] [address] [facility]  Initial Assessment    NAME: Lena Hodges  : 1922  MRN: [id number]    Date of Service: 2019  Evaluating Therapist: Eduardo Brasher DPT    ROOM: [address] [place] [address]  DIAGNOSIS: Multiple Fractures  PMH: S/p fall on 19 on left side. Xrays of left shoulder demonstrating a displaced scapular body fracture that extends into the glenoid. There is also previous fracture of the proximal humerus at the surgical neck with valgus impaction (left scapular body fracture, left glenoid fracture). Also found to have a left sided pneumothorax with left sided 7th rib fxand had a chest tube placed (removed 19). PMH includes HTN, R hip ORIF     PRECAUTIONS: NWBing L UE, sling when OOB, falls,     Social:  Pt lives alone in a 1 story condo with one step to enter and no HR. Prior to admission pt walked with a Hurrycane. Reported independent with ADLs/IADLs. Actively driving. Son checks in on him readily. Pt. Has a /cook 3days/wk       Initial Evaluation  19         Short Term Goals Long Term Goals    Was pt agreeable to Eval/treatment? yes       Does pt have pain?  No pain       Bed Mobility  Rolling: sba  Supine to sit: sba  Sit to supine: sba  Scooting: sba   sup Mod I   Transfers Sit to stand: cg/sba  Stand to sit: cg/sba  Stand pivot: cgA with spc   Sup with AAD Mod I with AAD   Ambulation    150 feet with cgA/sba with spc   250 feet with AAD with sup >500 feet with AAD with mod I   Walking 10 feet on uneven surface 10 feet with spc with cg/sba       Wheel Chair Mobility n/a       Car Transfers CgA/sba with spc   sup Mod I   Stair negotiation: ascended and descended  8 steps with right HR with cg/sba   12 steps with one rail with sup >12 steps with one rail with mod I   Curb Step:   ascended and descended 4 inch step with spc and min/modA (LOB to right when descending)   4 inch step with AAD and sba 4 inch step with AAD and mod I   Picking up object off the floor Picked up 2# with cgA assist       BLE ROM WFL       BLE Strength 4/5 bilateral LE       Balance  Sitting: Independent   Standing: cgA/sba with spc       Date Family Teach Completed TBA       Is additional Family Teaching Needed? Y or N Y       Hindering Progress Balance, NWB left UE       PT recommended ELOS 7-10 days       Team's Discharge Plan        Therapist at Team Meeting          Pt is alert and Oriented x4. Sensation: intact to LT but reporting tingling in heels  Edema: none noted  Endurance: fair   Skin was inspected: bruising noted R UE/hand  Chair alarm: n/a     ASSESSMENT  Pt displays functional ability as noted in the objective portion of this evaluation. Comments:  Pt sitting in w/c, agreeable to PT evaluation and treatment. Pt reporting no pain with mobility and maintaining NWBing without any further cueing required t/o session. Pt requiring cgA at times during ambulation to occasional deviation to left side. Spo2 on RA s/p ambulation 97 percent, HR 75 with no complaints. Pt requiring min/modA when descending curb step due to LOB to right side due while placing spc. Pt slightly retropulsive with stair negotiation when completing, completed with reciprocal pattern. Pt presents with decreased LE strength limiting transfers, requiring increased assistance for transfer/ambulation/stair/curb negotiation due to [address] L UE, and deficits with balance noted by LOB with curb step negotiation. Pt will benefit from skilled PT to increase above deficits and maximize balance in order to return home at highest level of functional mobility.      Patient education  Pt educated on [address] with transfers, safety with mobility, sequencing with curb stair negotiation, role of PT, PT POC, call bell use, need for assistance    Patient response to education:   Pt verbalized understanding Pt demonstrated skill Pt requires further education in this area   yes yes Reinforcement      Rehab potential is Good for reaching above PT goals. Pts/ family goals   1. To get home    Patient and or family understand(s) diagnosis, prognosis, and plan of care. PLAN  PT care will be provided in accordance with the objectives noted above. Whenever appropriate, clear delegation orders will be provided for nursing staff. Exercises and functional mobility practice will be used as well as appropriate assistive devices or modalities to obtain goals. Patient and family education will also be administered as needed. Frequency of treatments will be 2x/day M-F and 1x/day Sat or Sun x  7-10 days.     Time in: 1000  Time out: Remigio Lee   [id number]

## 2019-05-07 NOTE — PROGRESS NOTES
Physical Therapy    Facility/Department: [facility]  Daily Treatment Note    NAME: John Cameron  : 1922  MRN: [id number]    Date of Service: 2019  Evaluating Therapist: Carlos Sinclair DPT    ROOM: [address] [place] [address]  DIAGNOSIS: Multiple Fractures  PMH: S/p fall on 19 on left side. Xrays of left shoulder demonstrating a displaced scapular body fracture that extends into the glenoid. There is also previous fracture of the proximal humerus at the surgical neck with valgus impaction (left scapular body fracture, left glenoid fracture). Also found to have a left sided pneumothorax with left sided 7th rib fxand had a chest tube placed (removed 19). PMH includes HTN, R hip ORIF     PRECAUTIONS: NWBing L UE, sling when OOB, falls,     Social:  Pt lives alone in a 1 story condo with one step to enter and no HR. Prior to admission pt walked with a Hurrycane. Reported independent with ADLs/IADLs. Actively driving. Son checks in on him readily. Pt. Has a /cook 3days/wk       Initial Evaluation  19      PM   Short Term Goals Long Term Goals    Was pt agreeable to Eval/treatment? yes  Yes       Does pt have pain?  No pain  No pain      Bed Mobility  Rolling: sba  Supine to sit: sba  Sit to supine: sba  Scooting: sba  Rolling: sba  Supine to sit: sba  Sit to supine: sba  Scooting: sba sup Mod I   Transfers Sit to stand: cg/sba  Stand to sit: cg/sba  Stand pivot: cgA with spc  Sit to stand: cg/sba  Stand to sit: cg/sba  Stand pivot: cgA with spc Sup with AAD Mod I with AAD   Ambulation    150 feet with cgA/sba with spc  150 with spc cg/sba  250 feet with AAD with sup >500 feet with AAD with mod I   Walking 10 feet on uneven surface 10 feet with spc with cg/sba       Wheel Chair Mobility n/a       Car Transfers CgA/sba with spc  sba with spc sup Mod I   Stair negotiation: ascended and descended  8 steps with right HR with cg/sba  12 steps with R HR sba 12 steps with one rail with sup >12 steps with one rail with mod I   Curb Step:   ascended and descended 4 inch step with spc and min/modA (LOB to right when descending)  NT 4 inch step with AAD and sba 4 inch step with AAD and mod I   Picking up object off the floor Picked up 2# with cgA assist       BLE ROM WFL       BLE Strength 4/5 bilateral LE       Balance  Sitting: Independent   Standing: cgA/sba with spc       Date Family Teach Completed TBA  Scheduled 5/15/19 am       Is additional Family Teaching Needed? Y or N Y       Hindering Progress Balance, NWB left UE       PT recommended ELOS 7-10 days       Team's Discharge Plan        Therapist at Team Meeting          Therapeutic Exercise:   PM: 5 x STS transfer, no cues for hand placement and maintaining NWBing  Balance in // bars: standing unsupported NBOS EO/EC with increased sway noted with EC, unable to maintain eyes closed and maintain midline    Patient education  Pt educated on safety with spt, sequencing with spc to maximize balance    Patient response to education:   Pt verbalized understanding Pt demonstrated skill Pt requires further education in this area   yes Yes with occasional cues yes     Additional Comments: Reviewed all mobility as per above. Pt requiring constant cues to place spc wider during ambulation, as well as keeping up with patterning of placement of the cane to maximize balance with ambulation. LOB in bilateral directions noted with mobility. Poor ankle strategy noted with balance tasks in // bars. PM  Time in: 1515  Time out: 1600    Pt is making fair progress toward established Physical Therapy goals. Continue with physical therapy current plan of care.     Vamshi Hood, MADHUT  [id number]

## 2019-05-08 PROCEDURE — 6370000000 HC RX 637 (ALT 250 FOR IP): Performed by: PHYSICAL MEDICINE & REHABILITATION

## 2019-05-08 PROCEDURE — 97530 THERAPEUTIC ACTIVITIES: CPT

## 2019-05-08 PROCEDURE — 97110 THERAPEUTIC EXERCISES: CPT

## 2019-05-08 PROCEDURE — 1280000000 HC REHAB R&B

## 2019-05-08 PROCEDURE — 6370000000 HC RX 637 (ALT 250 FOR IP): Performed by: STUDENT IN AN ORGANIZED HEALTH CARE EDUCATION/TRAINING PROGRAM

## 2019-05-08 PROCEDURE — 97535 SELF CARE MNGMENT TRAINING: CPT

## 2019-05-08 PROCEDURE — 6360000002 HC RX W HCPCS: Performed by: PHYSICAL MEDICINE & REHABILITATION

## 2019-05-08 RX ADMIN — ENOXAPARIN SODIUM 40 MG: 40 INJECTION SUBCUTANEOUS at 08:52

## 2019-05-08 RX ADMIN — METOPROLOL SUCCINATE 12.5 MG: 25 TABLET, FILM COATED, EXTENDED RELEASE ORAL at 06:49

## 2019-05-08 RX ADMIN — FINASTERIDE 5 MG: 5 TABLET, FILM COATED ORAL at 06:49

## 2019-05-08 RX ADMIN — TAMSULOSIN HYDROCHLORIDE 0.4 MG: 0.4 CAPSULE ORAL at 17:26

## 2019-05-08 RX ADMIN — CARBAMAZEPINE 200 MG: 200 TABLET ORAL at 06:49

## 2019-05-08 RX ADMIN — ACETAMINOPHEN 650 MG: 325 TABLET, FILM COATED ORAL at 08:53

## 2019-05-08 ASSESSMENT — PAIN SCALES - GENERAL
PAINLEVEL_OUTOF10: 0
PAINLEVEL_OUTOF10: 1
PAINLEVEL_OUTOF10: 0

## 2019-05-08 NOTE — PROGRESS NOTES
progress during treatment sessions toward set goals. Therapy emphasis to obtain goals: Gait Training, Balance Training, Patient/Caregiver Education & Training, Self-Care / ADL, Functional Mobility Training, Equipment Evaluation, Education, & procurement, Home Management Training, Endurance Training         [x] Continue with current OT Plan of care. [] Prepare for Discharge     DISCHARGE RECOMMENDATIONS  Recommended DME:    Post Discharge Care:   []Home Independently  []Home with 24hr Care / Supervision []Home with Partial Supervision []Home with Home Health OT []Home with Out Pt OT []Other: ___   Comments:         Time in Time out Tx Time Breakdown  Variance:   First Session  10:45 11:30 [x] Individual Tx- 45 Mins  [] Concurrent Tx -  [] Co-Tx -   [] Group Tx -   [] Time Missed -     Second Session 1:00 1:45 [x] Individual Tx- 45 Mins   [] Concurrent Tx -   [] Co-Tx -   [] Group Tx -   [] Time Missed -     Third Session    [] Individual Tx-   [] Concurrent Tx -  [] Co-Tx -   [] Group Tx -   [] Time Missed -         Total Tx Time  90 Mins   Elo Alex PAIGE/L [id number]   I have read the above note and agree with the documentation.   Neal Diaz OTR/L [id number]

## 2019-05-08 NOTE — PROGRESS NOTES
Occupational Therapy     05/08/19 1412   Attendance   Activity Cards   Participation Active participation   [place] Demonstrates ability to complete social goals   Social Skills Interacts independently in social activity   Leisure Education Demonstrates knowledge of benefits of leisure involvement  (Identified making new friends as a benefit.)   Time Spent With Patient   Minutes 40

## 2019-05-08 NOTE — PROGRESS NOTES
___________      Socialization: C___Family  _C__Friends  ___Neighbors       ___Church  ___Volunteer ___Club/Organization                                                     ___Other: ___________    Sports/Exercises:  ___Walking  ___Football  ___Basketball     _P__Golf  ___Baseball  ___Tennis       ___Bowling  ___Other: ___________      Traveling:   _P__Global  _P__Stateside  ___Local       ___Other: ___________      TV/Radio:   ___Mysteries  ___Comedies ___Romance                                                     ___Game show       ___Sports       ___News                                                      ___Talk show          _C__Other: __A Variety_________      Gian Pelayo would like to be addressed as Jesse Sherwood. Personal Leisure Profile:   Question Response   Attributes Identify a positive quality: []Ambitious  []Appreciative  [x]Caring  []Courteous  []Considerate  []Compassionate  []Creative  []Dependable   []Generous  []Honest  []Hard working  []Helpful  []Kind  [][place]   [][place] Lear  [][place]  []Patient  []Polite  []Respectful  []Sociable  []Sense of humor  []Thoughtful  []Other: ___________    You are very good at Cards   Awareness Why do you participate in your leisure interest: []Competition  []Creativity  []Concentration  []Exercise  [x]Enjoyment  []Fun  []Hand/eye Coordination  []Increase confidence  []Learning a new skill  []Relaxation  [x]Social Interaction  []Supporting one another  []Thinking  []Other: ___________    Are your leisure activities limited at this time? [x]Yes  []No  Comments: _________    How often do you participate in your leisure interest? Everyday   Resources Name a restaurant, store or business you frequent? [employer]   Personal When are you most happy?  With people     Barriers to Leisure Participation:  []Visual   [][place]  []Cognition/Communication  []Motivation  []Financial  []Transportation  []Time Constraints  [x]Physical Ability  []Leisure Awareness  []Drug/Alcohol []Other: ___________    Recommendations:  [x]Group Session  [x]Individual Session  []Client Refused Services  []No Therapy  []Other: ___________    Objectives:  []Establish adaptations for leisure involvement   []Increase Self worth/self esteem  []Community reintegration training   [x]Social interaction  [x]Leisure participation  []Other: ___________    Goals for Therapeutic Recreation Services:   Social Interaction:   Admission Functional [place] Measure: ____6_______  Discharge Functional [place] Measure: ____7_______   Other:________________________________      Leisure Choice/ Increase Aan Quality of Life: To participate in 1 premorbid leisure activities of choice by discharge to increase leisure choice and independence thus increasing the overall quality of his/her life. Socialization/Social Interaction: To increase social interaction skills by introducing self 1 x per week at the beginning of TR session Socialization/Social Interaction: To initiate conversation 1 x per week during the TR session      Leisure Activity Tolerance: To increase leisure tolerance by attending 1 leisure activities per week for 25 minutes with active participation. Self Expression: To participate in 1 leisure activity(s) of choice, identifying 1 benefits to leisure participation. Self esteem/confidence: To complete 1 leisure activities with success 1 x  per week by discharge. Yvette Dillard

## 2019-05-08 NOTE — PROGRESS NOTES
Physical Therapy    Facility/Department: [address] [facility]  Daily Treatment Note    NAME: Mitesh Munizs  : 1922  MRN: [id number]    Date of Service: 2019  Evaluating Therapist: Bridgette Kimble DPT    ROOM: [id number]  DIAGNOSIS: Multiple Fractures  PMH: S/p fall on 19 on left side. Xrays of left shoulder demonstrating a displaced scapular body fracture that extends into the glenoid. There is also previous fracture of the proximal humerus at the surgical neck with valgus impaction (left scapular body fracture, left glenoid fracture). Also found to have a left sided pneumothorax with left sided 7th rib fxand had a chest tube placed (removed 19). PMH includes HTN, R hip ORIF     PRECAUTIONS: NWBing L UE, sling when OOB, falls,     Social:  Pt lives alone in a 1 story condo with one step to enter and no HR. Prior to admission pt walked with a Hurrycane. Reported independent with ADLs/IADLs. Actively driving. Son checks in on him readily. Pt. Has a /cook 3days/wk       Initial Evaluation  19  AM   PM   Short Term Goals Long Term Goals    Was pt agreeable to Eval/treatment? yes yes Yes       Does pt have pain?  No pain No pain  No pain      Bed Mobility  Rolling: sba  Supine to sit: sba  Sit to supine: sba  Scooting: sba Rolling: sup  Supine to sit: sup  Sit to supine: NT  Scooting: sup Rolling: sup  Supine to sit: sup  Sit to supine: NT  Scooting: sup sup Mod I   Transfers Sit to stand: cg/sba  Stand to sit: cg/sba  Stand pivot: cgA with spc Sit to stand: sba  Stand to sit: sba  Stand pivot: cgA with spc Sit to stand: sba  Stand to sit: sba  Stand pivot: cgA/sba with spc Sup with AAD Mod I with AAD   Ambulation    150 feet with cgA/sba with spc 175' with spc cg/sba 200 with spc cg/sba  250 feet with AAD with sup >500 feet with AAD with mod I   Walking 10 feet on uneven surface 10 feet with spc with cg/sba       Wheel Chair Mobility n/a       Car Transfers CgA/sba with spc sba with spc sba with spc sup Mod I   Stair negotiation: ascended and descended  8 steps with right HR with cg/sba NT 12 steps with R HR sba 12 steps with one rail with sup >12 steps with one rail with mod I   Curb Step:   ascended and descended 4 inch step with spc and min/modA (LOB to right when descending) 4 in curb step with spc cg/sba NT 4 inch step with AAD and sba 4 inch step with AAD and mod I   Picking up object off the floor Picked up 2# with cgA assist       BLE ROM WFL       BLE Strength 4/5 bilateral LE       Balance  Sitting: Independent   Standing: cgA/sba with spc       Date Family Teach Completed TBA  Scheduled 5/15/19 am       Is additional Family Teaching Needed? Y or N Y       Hindering Progress Balance, NWB left UE       PT recommended ELOS 7-10 days       Team's Discharge Plan        Therapist at Team Meeting          Therapeutic Exercise:   AM: 5x STS transfer at w/c, no cues for hand placement  2 in curb step in // bars: no UE support step ups with alternating patterning, 2 instances of LOB to left hemibody with therapist assistance to correct 2x10 reps  Curb step (4 in) x2 attempts, emphasis on sequencing and weight shifting to right hemibody to prevent left leaning    PM:  5x STS transfer at w/c, no cues for hand placement  2 in curb step step ups/alternating x10  Side stepping bilateral directions x3 reps with blue TBAND to increase pelvic stability/hip abduction activation 10' x3    Patient education  Pt educated on sequencing with spc during mobility, sequencing with spc during curb step, safety with spt     Patient response to education:   Pt verbalized understanding Pt demonstrated skill Pt requires further education in this area   yes Yes with occasional cues yes     Additional Comments: Continued to emphasize proper sequencing with spc with all mobility, including placing spc wider t/o patterning and utilizing spc (pt at times lifting spc up and pointing out objects in room).  Pt continues to deviate towards left hemibody at times with mobility, with emphasis on weight shifting to right hemibody to return LOB to left side. Improvement in curb step patterning/safety noted today. In PM, continued to emphasis balance and safety with mobility. With assessment, R trendelenberg gait noted leading to left lean. Will continue to emphasize pelvic stability with R LE stance as well as sequencing. Pt unable to sequence attempt with [employer]-film[employer] leading to poor balance/LOB. AM  Time in: 1000  Time out: 1045    PM  Time in: 1515  Time out: 1600    Pt is making fair+ progress toward established Physical Therapy goals. Continue with physical therapy current plan of care.     Maryann Norris DPT  [id number]

## 2019-05-09 PROCEDURE — 6370000000 HC RX 637 (ALT 250 FOR IP): Performed by: STUDENT IN AN ORGANIZED HEALTH CARE EDUCATION/TRAINING PROGRAM

## 2019-05-09 PROCEDURE — 6360000002 HC RX W HCPCS: Performed by: PHYSICAL MEDICINE & REHABILITATION

## 2019-05-09 PROCEDURE — 1280000000 HC REHAB R&B

## 2019-05-09 PROCEDURE — 97530 THERAPEUTIC ACTIVITIES: CPT

## 2019-05-09 PROCEDURE — 97110 THERAPEUTIC EXERCISES: CPT

## 2019-05-09 PROCEDURE — 6370000000 HC RX 637 (ALT 250 FOR IP): Performed by: PHYSICAL MEDICINE & REHABILITATION

## 2019-05-09 PROCEDURE — 97535 SELF CARE MNGMENT TRAINING: CPT

## 2019-05-09 RX ADMIN — ACETAMINOPHEN 650 MG: 325 TABLET, FILM COATED ORAL at 11:04

## 2019-05-09 RX ADMIN — TAMSULOSIN HYDROCHLORIDE 0.4 MG: 0.4 CAPSULE ORAL at 17:05

## 2019-05-09 RX ADMIN — ENOXAPARIN SODIUM 40 MG: 40 INJECTION SUBCUTANEOUS at 08:27

## 2019-05-09 RX ADMIN — CARBAMAZEPINE 200 MG: 200 TABLET ORAL at 06:18

## 2019-05-09 RX ADMIN — METOPROLOL SUCCINATE 12.5 MG: 25 TABLET, FILM COATED, EXTENDED RELEASE ORAL at 06:17

## 2019-05-09 RX ADMIN — FINASTERIDE 5 MG: 5 TABLET, FILM COATED ORAL at 06:18

## 2019-05-09 ASSESSMENT — PAIN SCALES - GENERAL
PAINLEVEL_OUTOF10: 0
PAINLEVEL_OUTOF10: 3
PAINLEVEL_OUTOF10: 0
PAINLEVEL_OUTOF10: 0
PAINLEVEL_OUTOF10: 1

## 2019-05-09 ASSESSMENT — PAIN DESCRIPTION - ORIENTATION: ORIENTATION: LOWER;MID

## 2019-05-09 ASSESSMENT — PAIN - FUNCTIONAL ASSESSMENT: PAIN_FUNCTIONAL_ASSESSMENT: ACTIVITIES ARE NOT PREVENTED

## 2019-05-09 ASSESSMENT — PAIN DESCRIPTION - FREQUENCY: FREQUENCY: INTERMITTENT

## 2019-05-09 ASSESSMENT — PAIN DESCRIPTION - PAIN TYPE: TYPE: ACUTE PAIN

## 2019-05-09 ASSESSMENT — PAIN DESCRIPTION - DESCRIPTORS: DESCRIPTORS: ACHING;DISCOMFORT;DULL

## 2019-05-09 ASSESSMENT — PAIN DESCRIPTION - LOCATION: LOCATION: BACK

## 2019-05-09 NOTE — PROGRESS NOTES
Contacted attending at this time in regards to patient c/o lower lumbar pain. New order for heating pad PRN placed.

## 2019-05-09 NOTE — PROGRESS NOTES
Physical Therapy    Facility/Department: [address] [facility]  Daily Treatment Note    NAME: Raza Sherwood  : 1922  MRN: [id number]    Date of Service: 2019  Evaluating Therapist: Allyn Cheek DPT    ROOM: [address] [place] [address]  DIAGNOSIS: Multiple Fractures  PMH: S/p fall on 19 on left side. Xrays of left shoulder demonstrating a displaced scapular body fracture that extends into the glenoid. There is also previous fracture of the proximal humerus at the surgical neck with valgus impaction (left scapular body fracture, left glenoid fracture). Also found to have a left sided pneumothorax with left sided 7th rib fxand had a chest tube placed (removed 19). PMH includes HTN, R hip ORIF     PRECAUTIONS: NWBing L UE, sling when OOB, falls,     Social:  Pt lives alone in a 1 story condo with one step to enter and no HR. Prior to admission pt walked with a [employer]ry[employer]. Reported independent with ADLs/IADLs. Actively driving. Son checks in on him readily. Pt. Has a /cook 3days/wk       Initial Evaluation  19  AM   PM   Short Term Goals Long Term Goals    Was pt agreeable to Eval/treatment? yes yes Yes       Does pt have pain?  No pain 4/10 low back pain  No pain      Bed Mobility  Rolling: sba  Supine to sit: sba  Sit to supine: sba  Scooting: sba Rolling: sup  Supine to sit: sup  Sit to supine: NT  Scooting: sup Rolling: sup  Supine to sit: sup  Sit to supine: sup  Scooting: sup sup Mod I   Transfers Sit to stand: cg/sba  Stand to sit: cg/sba  Stand pivot: cgA with spc Sit to stand: sba/sup  Stand to sit: sba  Stand pivot: cgA with spc Sit to stand: sba/sup  Stand to sit: sba  Stand pivot: cgA/sba with spc Sup with AAD Mod I with AAD   Ambulation    150 feet with cgA/sba with spc 200' with spc cga/sba 225 with spc cgA/sba  250 feet with AAD with sup >500 feet with AAD with mod I   Walking 10 feet on uneven surface 10 feet with spc with cg/sba       Wheel Chair Mobility n/a       Car Transfers CgA/sba with spc sba/sup with spc sba/sup with spc sup Mod I   Stair negotiation: ascended and descended  8 steps with right HR with cg/sba 12 steps with R HR sba 12 steps with R HR sba 12 steps with one rail with sup >12 steps with one rail with mod I   Curb Step:   ascended and descended 4 inch step with spc and min/modA (LOB to right when descending) 4 in curb step x4 with spc cg/sba 4 in curb step x4 with spc cg/sba 4 inch step with AAD and sba 4 inch step with AAD and mod I   Picking up object off the floor Picked up 2# with cgA assist       BLE ROM WFL       BLE Strength 4/5 bilateral LE       Balance  Sitting: Independent   Standing: cgA/sba with spc       Date Family Teach Completed TBA  Scheduled 5/15/19 am       Is additional Family Teaching Needed? Y or N Y       Hindering Progress Balance, NWB left UE       PT recommended ELOS 7-10 days       Team's Discharge Plan        Therapist at Team Meeting          Therapeutic Exercise:   AM: 5x STS transfer at w/c, no cues for hand placement or technique but increased time/momentum to reach standing  5x STS transfers x2 reps with blue tband to increase hip abductor activation during functional mobility   Monster walks in // bars bilateral directions x6 reps with blue tband to increase hip abductor activation/pelvic stability     PM: 5x STS transfers x4 reps with blue tband to increase hip abductor activation during functional mobility     Patient education  Pt educated on safety with spc during mobility, safety with spt, seqeuncing with wider placement of spc, pelvic stability with R LE stance    Patient response to education:   Pt verbalized understanding Pt demonstrated skill Pt requires further education in this area   yes Yes with occasional cues yes     Additional Comments: Reviewed all mobility as per above grid. Emphasis on balance with R LE pelvic stability, as well as sequencing with spc/wider MADDI with L LE to correct balance deviations.  Pt continues to require safety cues with spc, at times picking up spc to point to objects during mobility. Occasional deviation to left hemibody during ambulation due to R trendelenberg gait pattern continues. Emphasized pelvic stability with tactile/verbal cues during R SLS with all mobility. Improvement noted with consistency of preventing right trendelenberg pattern during gait but still present t/o mobility, especially with fatigue. Will continue to progress safety/balance as able. Chair/bed alarm: yes    AM  Time in: 1000  Time out: 1045    PM  Time in: 1515  Time out: 1600    Pt is making fair+ progress toward established Physical Therapy goals. Continue with physical therapy current plan of care.     Debora Howell, DPT  [id number]

## 2019-05-09 NOTE — PROGRESS NOTES
Parish Mcmanus is a 80 y.o. male patient. Current Facility-Administered Medications   Medication Dose Route Frequency Provider Last Rate Last Dose    enoxaparin (LOVENOX) injection 40 mg  40 mg Subcutaneous Daily Ying Taylor MD   40 mg at 05/09/19 0827    methocarbamol (ROBAXIN) tablet 500 mg  500 mg Oral 4x Daily PRN Ying Taylor MD        docusate sodium (COLACE) capsule 100 mg  100 mg Oral BID Harman B Capal, DO   100 mg at 05/07/19 2132    carBAMazepine (TEGRETOL) tablet 200 mg  200 mg Oral QAM AC Harman B Capal, DO   200 mg at 05/09/19 0618    finasteride (PROSCAR) tablet 5 mg  5 mg Oral QAM AC Harman B Capal, DO   5 mg at 05/09/19 0618    metoprolol succinate (TOPROL XL) extended release tablet 12.5 mg  12.5 mg Oral QAM AC Harman B Capal, DO   12.5 mg at 05/09/19 0617    tamsulosin (FLOMAX) capsule 0.4 mg  0.4 mg Oral Dinner Harman B Capal, DO   0.4 mg at 05/08/19 1726    acetaminophen (TYLENOL) tablet 650 mg  650 mg Oral Q4H PRN Ying Taylor MD   650 mg at 05/08/19 0853    magnesium hydroxide (MILK OF MAGNESIA) 400 MG/5ML suspension 30 mL  30 mL Oral Daily PRN Ying Taylor MD         No Known Allergies  Active Problems:    Multiple fractures  Resolved Problems:    * No resolved hospital problems. *    Blood pressure (!) 99/54, pulse 80, temperature 97.5 °F (36.4 °C), resp. rate 16, height 6' (1.829 m), weight 156 lb 11.2 oz (71.1 kg), SpO2 97 %. Subjective:  Symptoms:  Stable. He reports weakness. Diet:  Adequate intake. Activity level: Impaired due to weakness. Pain:  He complains of pain that is mild. Objective:  General Appearance: In no acute distress. Vital signs: (most recent): Blood pressure (!) 99/54, pulse 80, temperature 97.5 °F (36.4 °C), resp. rate 16, height 6' (1.829 m), weight 156 lb 11.2 oz (71.1 kg), SpO2 97 %. Vital signs are normal.    Output: Producing urine and producing stool.     Lungs:  Normal effort and normal respiratory rate.  Breath sounds clear to auscultation. Heart: Normal rate. Regular rhythm. S1 normal and S2 normal.    Abdomen: Abdomen is soft. Bowel sounds are normal.   There is no abdominal tenderness. Extremities: Normal range of motion. Neurological: Patient is alert. (4-/5 str). Assessment:    Condition: In stable condition. Improving.   (Multiple trauma). Plan:   Encourage ambulation. (Tolerating therapy well  Pain adequately controlled  Working with OT on ADLs).        Aixa Valentino MD  5/9/2019

## 2019-05-10 PROCEDURE — 6360000002 HC RX W HCPCS: Performed by: PHYSICAL MEDICINE & REHABILITATION

## 2019-05-10 PROCEDURE — 97530 THERAPEUTIC ACTIVITIES: CPT

## 2019-05-10 PROCEDURE — 97535 SELF CARE MNGMENT TRAINING: CPT

## 2019-05-10 PROCEDURE — 1280000000 HC REHAB R&B

## 2019-05-10 PROCEDURE — 6370000000 HC RX 637 (ALT 250 FOR IP): Performed by: STUDENT IN AN ORGANIZED HEALTH CARE EDUCATION/TRAINING PROGRAM

## 2019-05-10 PROCEDURE — 6370000000 HC RX 637 (ALT 250 FOR IP): Performed by: PHYSICAL MEDICINE & REHABILITATION

## 2019-05-10 RX ADMIN — ACETAMINOPHEN 650 MG: 325 TABLET, FILM COATED ORAL at 08:54

## 2019-05-10 RX ADMIN — METOPROLOL SUCCINATE 12.5 MG: 25 TABLET, FILM COATED, EXTENDED RELEASE ORAL at 06:45

## 2019-05-10 RX ADMIN — FINASTERIDE 5 MG: 5 TABLET, FILM COATED ORAL at 06:44

## 2019-05-10 RX ADMIN — ACETAMINOPHEN 650 MG: 325 TABLET, FILM COATED ORAL at 14:40

## 2019-05-10 RX ADMIN — DOCUSATE SODIUM 100 MG: 100 CAPSULE, LIQUID FILLED ORAL at 21:25

## 2019-05-10 RX ADMIN — ENOXAPARIN SODIUM 40 MG: 40 INJECTION SUBCUTANEOUS at 08:53

## 2019-05-10 RX ADMIN — TAMSULOSIN HYDROCHLORIDE 0.4 MG: 0.4 CAPSULE ORAL at 16:49

## 2019-05-10 RX ADMIN — CARBAMAZEPINE 200 MG: 200 TABLET ORAL at 06:45

## 2019-05-10 ASSESSMENT — PAIN DESCRIPTION - FREQUENCY: FREQUENCY: INTERMITTENT

## 2019-05-10 ASSESSMENT — PAIN DESCRIPTION - DESCRIPTORS: DESCRIPTORS: ACHING;DISCOMFORT;DULL

## 2019-05-10 ASSESSMENT — PAIN DESCRIPTION - PROGRESSION: CLINICAL_PROGRESSION: NOT CHANGED

## 2019-05-10 ASSESSMENT — PAIN DESCRIPTION - ONSET: ONSET: ON-GOING

## 2019-05-10 ASSESSMENT — PAIN SCALES - GENERAL
PAINLEVEL_OUTOF10: 0
PAINLEVEL_OUTOF10: 0
PAINLEVEL_OUTOF10: 5
PAINLEVEL_OUTOF10: 0
PAINLEVEL_OUTOF10: 4

## 2019-05-10 ASSESSMENT — PAIN DESCRIPTION - PAIN TYPE: TYPE: CHRONIC PAIN

## 2019-05-10 ASSESSMENT — PAIN DESCRIPTION - ORIENTATION: ORIENTATION: LOWER

## 2019-05-10 ASSESSMENT — PAIN DESCRIPTION - LOCATION: LOCATION: BACK

## 2019-05-10 NOTE — PROGRESS NOTES
Occupational Therapy  OCCUPATIONAL THERAPY DAILY NOTE    Date:5/10/2019  Patient Name: Cornelio Kemp  MRN: [id number]  : 1922  Room: [address] [place] [address]     Diagnosis: s/p fall- rib fx L, L pneumothorax, L scapula fx  Precautions: Fall Risk, Seizure, NWB LUE, sling LUE    Functional Assessment:   Date Status AE  Comments   Feeding 5/10/19 Sup     Grooming 19 Mod I  Standing at sink washing hands     Bathing 19 SBA  UB and LB bathing seated and standing with VCs to avoid use of LUE to maintain balance d/t NWB restriction. SBA for stand balance. UB Dressing 19 Supervision  Don/doff button up shirt and doffed tank top. LB Dressing 5/10/19  SBA   Pt donned socks and shoes    Homemaking 19 SBA  In kitchen pt prepared soup from can with open tab. Pt completed with SBA and VCs to avoid use of LUE d/t WB restrictions. Functional Transfers / Balance:   Date Status DME  Comments   Sit Balance 5/10/19 Supervision     Stand Balance 5/10/19 SBA  VCs for base of support. [] Tub  [x] Shower   Transfer 19 SBA SPC, extended tub bench    Commode   Transfer 5/10/19  SBA SPC Verbal cues to not push up through LUE when going from sit to stand    Functional   Mobility 5/10/19 SBA SPC VCs to utilize RUE only when using SPC. PM session throughout pt's room with [facility]   Other:  Bed>WC   19   CGA       Functional Exercises / Activity:  Pt demo Sup sit<>stand. Pt demo SBA functional mobility throughout the apt. Pt demo SBA to ambulate to a recliner & chair with arm rests. .     Sensory / Neuromuscular Re-Education:      Cognitive Skills:   Status Comments   Problem   Solving fair     Memory fair     Sequencing fair     Safety fair       Visual Perception:    Education:  Pt educated on base of support for safety. Pt educated on technique to complete extended tub bench transfer. Pt educated on weight bearing restrictions for UB dressing.      [] Family teach completed on:    Pain Level: 3/10 rib pain

## 2019-05-10 NOTE — PROGRESS NOTES
Travon Elena is a 80 y.o. male patient. Current Facility-Administered Medications   Medication Dose Route Frequency Provider Last Rate Last Dose    enoxaparin (LOVENOX) injection 40 mg  40 mg Subcutaneous Daily Janki Lo MD   40 mg at 05/09/19 0827    methocarbamol (ROBAXIN) tablet 500 mg  500 mg Oral 4x Daily PRN Janki Lo MD        docusate sodium (COLACE) capsule 100 mg  100 mg Oral BID Harman B Capal, DO   100 mg at 05/07/19 2132    carBAMazepine (TEGRETOL) tablet 200 mg  200 mg Oral QAM AC Harman B Capal, DO   200 mg at 05/10/19 0645    finasteride (PROSCAR) tablet 5 mg  5 mg Oral QAM AC Harman B Capal, DO   5 mg at 05/10/19 0644    metoprolol succinate (TOPROL XL) extended release tablet 12.5 mg  12.5 mg Oral QAM AC Harman B Capal, DO   12.5 mg at 05/10/19 0645    tamsulosin (FLOMAX) capsule 0.4 mg  0.4 mg Oral Dinner Harman B Capal, DO   0.4 mg at 05/09/19 1705    acetaminophen (TYLENOL) tablet 650 mg  650 mg Oral Q4H PRN Janki Lo MD   650 mg at 05/09/19 1104    magnesium hydroxide (MILK OF MAGNESIA) 400 MG/5ML suspension 30 mL  30 mL Oral Daily PRN Janki Lo MD         No Known Allergies  Active Problems:    Multiple fractures  Resolved Problems:    * No resolved hospital problems. *    Blood pressure 117/66, pulse 82, temperature 97.5 °F (36.4 °C), resp. rate 16, height 6' (1.829 m), weight 156 lb 11.2 oz (71.1 kg), SpO2 97 %. Subjective:  Symptoms:  Stable. He reports weakness. Diet:  Adequate intake. Activity level: Impaired due to weakness. Pain:  He reports no pain. Objective:  General Appearance: In no acute distress. Vital signs: (most recent): Blood pressure 117/66, pulse 82, temperature 97.5 °F (36.4 °C), resp. rate 16, height 6' (1.829 m), weight 156 lb 11.2 oz (71.1 kg), SpO2 97 %. Vital signs are normal.    Output: Producing urine and producing stool. Lungs:  Normal effort and normal respiratory rate.   Breath sounds clear to auscultation. Heart: Normal rate. Regular rhythm. S1 normal and S2 normal.    Abdomen: Abdomen is soft. Bowel sounds are normal.   There is no abdominal tenderness. Extremities: Normal range of motion. Neurological: Patient is alert. (4-/5 RLE).   Initial Evaluation  5/7/19  AM   PM   Short Term Goals Long Term Goals    Was pt agreeable to Eval/treatment? yes yes Yes          Does pt have pain? No pain 4/10 low back pain  No pain        Bed Mobility  Rolling: sba  Supine to sit: sba  Sit to supine: sba  Scooting: sba Rolling: sup  Supine to sit: sup  Sit to supine: NT  Scooting: sup Rolling: sup  Supine to sit: sup  Sit to supine: sup  Scooting: sup sup Mod I   Transfers Sit to stand: cg/sba  Stand to sit: cg/sba  Stand pivot: cgA with spc Sit to stand: sba/sup  Stand to sit: sba  Stand pivot: cgA with spc Sit to stand: sba/sup  Stand to sit: sba  Stand pivot: cgA/sba with spc Sup with AAD Mod I with AAD   Ambulation    150 feet with cgA/sba with spc 200' with spc cga/sba 225 with spc cgA/sba  250 feet with AAD with sup >500 feet with AAD with mod I   Walking 10 feet on uneven surface 10 feet with spc with cg/sba           Wheel Chair Mobility n/a           Car Transfers CgA/sba with spc sba/sup with spc sba/sup with spc sup Mod I   Stair negotiation: ascended and descended  8 steps with right HR with cg/sba 12 steps with R HR sba 12 steps with R HR            Assessment:    Condition: In stable condition. Improving.   (Mutl fractures). Plan:   Encourage ambulation. (Doing well in therapy  R leg weak  Pain adeq controled).        Arlene Edwards MD  5/10/2019

## 2019-05-10 NOTE — PROGRESS NOTES
Physical Therapy    Facility/Department: [address] REHAB  Daily Treatment Note    NAME: Tiffany Garza  : 1922  MRN: [id number]    Date of Service: 5/10/2019  Evaluating Therapist: Maria Dolores Breaux DPT    ROOM: [id number]/[id number]  DIAGNOSIS: Multiple Fractures  PMH: S/p fall on 19 on left side. Xrays of left shoulder demonstrating a displaced scapular body fracture that extends into the glenoid. There is also previous fracture of the proximal humerus at the surgical neck with valgus impaction (left scapular body fracture, left glenoid fracture). Also found to have a left sided pneumothorax with left sided 7th rib fxand had a chest tube placed (removed 19). PMH includes HTN, R hip ORIF     PRECAUTIONS: NWBing L UE, sling when OOB, falls,     Social:  Pt lives alone in a 1 story condo with one step to enter and no HR. Prior to admission pt walked with a Hurrycane. Reported independent with ADLs/IADLs. Actively driving. Son checks in on him readily. Pt. Has a /cook 3days/wk       Initial Evaluation  19  AM   PM   Short Term Goals Long Term Goals    Was pt agreeable to Eval/treatment? yes yes Yes       Does pt have pain?  No pain 4-5/10 low back pain  4/10 low back pain       Bed Mobility  Rolling: sba  Supine to sit: sba  Sit to supine: sba  Scooting: sba Rolling: mod I  Supine to sit: mod I  Sit to supine: mod I  Scooting: mod I  sup Mod I   Transfers Sit to stand: cg/sba  Stand to sit: cg/sba  Stand pivot: cgA with spc Sit to stand: sup  Stand to sit: sba  Stand pivot: sba with spc Sit to stand: sup  Stand to sit: sba  Stand pivot: sba with spc Sup with AAD Mod I with AAD   Ambulation    150 feet with cgA/sba with spc 225' with spc sba 225 with spc cgA/sba  250 feet with AAD with sup >500 feet with AAD with mod I   Walking 10 feet on uneven surface 10 feet with spc with cg/sba       Wheel Chair Mobility n/a       Car Transfers CgA/sba with spc sup with spc sup with spc sup Mod I   Stair negotiation: ascended and descended  8 steps with right HR with cg/sba 16 steps with R HR sba/sup 16 steps with R HR sba/sup 12 steps with one rail with sup >12 steps with one rail with mod I   Curb Step:   ascended and descended 4 inch step with spc and min/modA (LOB to right when descending) 4 in curb step x4 with spc sba 4 in curb step x4 with spc cg/sba    4 in curb step modA with spc     (see comments) 4 inch step with AAD and sba 4 inch step with AAD and mod I   Picking up object off the floor Picked up 2# with cgA assist       BLE ROM WFL       BLE Strength 4/5 bilateral LE       Balance  Sitting: Independent   Standing: cgA/sba with spc       Date Family Teach Completed TBA  Scheduled 5/15/19 am       Is additional Family Teaching Needed? Y or N Y       Hindering Progress Balance, NWB left UE       PT recommended ELOS 7-10 days       Team's Discharge Plan        Therapist at Team Meeting          Therapeutic Exercise:   AM: 5x STS transfer at w/c, no cues for hand placement or technique but increased time/momentum to reach standing  5x STS transfers x2 reps with blue tband to increase hip abductor activation during functional mobility     PM: 5x STS transfers x4 reps with blue tband to increase hip abductor activation during functional mobility   Step ups in // bars without UE support: 2 in curb step alternating toe taps 2x10 reps, occasional deviation to left hemibody with R SLS    Patient education  Pt educated on balance strategies to reduce R trendelenberg during gait/curb step, safety with mobility    Patient response to education:   Pt verbalized understanding Pt demonstrated skill Pt requires further education in this area   yes Yes with occasional cues yes     Additional Comments: Reviewed all mobility as per above grid. Pt continues to demonstrate R trendelenberg gait pattern, particularly with fatigue leading to narrowing of left foot and deviation to left hemibody.  With verbal cueing to ascend/descend left LE with curb step, with improvement in pelvic stability/balance noted. In PM, reviewed all mobility as per above. Pt continues to demonstrate left lateral lean with mobility due to R trendelenberg pattern. Pt demonstrating more stability when completing stair/curb with left LE leading both ascending and descending. During last rep of curb step, pt leading with R LE and losing balance to left side with therapist providing modA to stabilize balance and recovery LOB. Pt reporting no change in back or shoulder pain. Pt returned to supine with call bell in reach and bed alarm activated. Chair/bed alarm: yes    AM  Time in: 1000  Time out: 1045    PM  Time in: 1345  Time out: 1430    Pt is making fair+ progress toward established Physical Therapy goals. Continue with physical therapy current plan of care.     Debora Howell, DPT  [id number]

## 2019-05-11 PROCEDURE — 97535 SELF CARE MNGMENT TRAINING: CPT

## 2019-05-11 PROCEDURE — 6370000000 HC RX 637 (ALT 250 FOR IP): Performed by: STUDENT IN AN ORGANIZED HEALTH CARE EDUCATION/TRAINING PROGRAM

## 2019-05-11 PROCEDURE — 6360000002 HC RX W HCPCS: Performed by: PHYSICAL MEDICINE & REHABILITATION

## 2019-05-11 PROCEDURE — 99232 SBSQ HOSP IP/OBS MODERATE 35: CPT | Performed by: PHYSICAL MEDICINE & REHABILITATION

## 2019-05-11 PROCEDURE — 97530 THERAPEUTIC ACTIVITIES: CPT

## 2019-05-11 PROCEDURE — 6370000000 HC RX 637 (ALT 250 FOR IP): Performed by: PHYSICAL MEDICINE & REHABILITATION

## 2019-05-11 PROCEDURE — 1280000000 HC REHAB R&B

## 2019-05-11 RX ADMIN — DOCUSATE SODIUM 100 MG: 100 CAPSULE, LIQUID FILLED ORAL at 21:12

## 2019-05-11 RX ADMIN — ACETAMINOPHEN 650 MG: 325 TABLET, FILM COATED ORAL at 21:12

## 2019-05-11 RX ADMIN — TAMSULOSIN HYDROCHLORIDE 0.4 MG: 0.4 CAPSULE ORAL at 16:57

## 2019-05-11 RX ADMIN — CARBAMAZEPINE 200 MG: 200 TABLET ORAL at 06:58

## 2019-05-11 RX ADMIN — ENOXAPARIN SODIUM 40 MG: 40 INJECTION SUBCUTANEOUS at 08:40

## 2019-05-11 RX ADMIN — FINASTERIDE 5 MG: 5 TABLET, FILM COATED ORAL at 06:58

## 2019-05-11 RX ADMIN — METOPROLOL SUCCINATE 12.5 MG: 25 TABLET, FILM COATED, EXTENDED RELEASE ORAL at 06:57

## 2019-05-11 ASSESSMENT — PAIN SCALES - GENERAL
PAINLEVEL_OUTOF10: 0
PAINLEVEL_OUTOF10: 5
PAINLEVEL_OUTOF10: 0

## 2019-05-11 NOTE — PROGRESS NOTES
05/09/2017    NITRU Negative 05/09/2017    GLUCOSEU Negative 05/09/2017    KETUA Negative 05/09/2017    UROBILINOGEN 0.2 05/09/2017    BILIRUBINUR Negative 05/09/2017       Functional Status:   Bed mobility: Supervision  Transfers: SBA/CGA  Ambulation: 200 ft spc CGA  Grooming: Mod I  UB dressing: Supervision  LB dressing: SBA     Assessment/Plan:     80 y.o. male admitted to inpatient rehabilitation for multiple fractures    -Stable condition  -Continue acute rehab program. Encourage ambulation.          Electronically signed by Aiyana Galindo MD on 5/11/2019 at 11:36 AM

## 2019-05-11 NOTE — PROGRESS NOTES
Occupational Therapy  OCCUPATIONAL THERAPY DAILY NOTE    Date:2019  Patient Name: Aaliyah Jose  MRN: [id number]  : 1922  Room: [address] [place] [address]     Diagnosis: s/p fall- rib fx L, L pneumothorax, L scapula fx  Precautions: Fall Risk, Seizure, NWB LUE, sling LUE    Functional Assessment:   Date Status AE  Comments   Feeding 5/10/19 Sup     Grooming 19 Mod I  Seated to perform face/hand washing. Bathing 19 SBA  Pt engaged in UB sponge bathing since declined shower. Pt declined LB bathing since already was done earlier. Occ vc's for NWB LUE and restrictions. UB Dressing 19 Supervision  Don/doff button up  Short and long sleeve shirts seated in w/c. Elisa Anderson LB Dressing 19  SBA   Pt donned socks using BLE crossover method. Pt able to choco shoes. Homemaking 19 SBA        Functional Transfers / Balance:   Date Status DME  Comments   Sit Balance 19 Supervision  Sitting balance on EOB and up in w/c    Stand Balance 19 SBA  Standing balance during sit to stand from w/c to sink to readjust shirts and during bed>transfers   [] Tub  [x] Shower   Transfer 19 SBA SPC, extended tub bench 19 Pt declined shower not necessary and feeling too cold right now. Commode   Transfer 5/10/19  SBA SPC     Functional   Mobility 5/10/19 SBA SPC    Other:  Bed>WC   19   SBA  One cue for NWB LUE during EOB to w/c transfers     Functional Exercises / Activity:  Pt completed partial ADL session to increase independence with dressing and bathing tasks. Sensory / Neuromuscular Re-Education:      Cognitive Skills:   Status Comments   Problem   Solving fair     Memory fair     Sequencing fair     Safety fair       Visual Perception:    Education:  Pt educated on safety during sit to stand transfers in regards to NWB LUE. Pt educated on weight bearing restrictions for UB dressing. [] Family teach completed on:    Pain Level: 3/10 rib pain and nurse aware.      Additional Notes: 5/11/19- Nurse made aware patient declined a shower and told this therapist he already had briefs changed. Patient has made good  progress during treatment sessions toward set goals. Therapy emphasis to obtain goals: Gait Training, Balance Training, Patient/Caregiver Education & Training, Self-Care / ADL, Functional Mobility Training, Equipment Evaluation, Education, & procurement, Home Management Training, Endurance Training       [x] Continue with current OT Plan of care. [] Prepare for Discharge     DISCHARGE RECOMMENDATIONS  Recommended DME:  Sc, tub seat    Post Discharge Care:   []Home Independently  []Home with 24hr Care / Supervision []Home with Partial Supervision []Home with Home Health OT []Home with Out Pt OT []Other: ___   Comments:         Time in Time out Tx Time Breakdown  Variance:   First Session  7:55am 8:38am [x] Individual Tx- 43Mins  [] Concurrent Tx -  [] Co-Tx -   [] Group Tx -   [] Time Missed -     Second Session   [] Individual Tx-      [] Concurrent Tx -   [] Co-Tx -   [] Group Tx -   [] Time Missed -             Third Session    [] Individual Tx-   [] Concurrent Tx -  [] Co-Tx -   [] Group Tx -   [] Time Missed -         Total Tx Time:43 mins    Roberto Mascorro PAIGE/L [id number]     I have read & agree with the above status.     Ramona Ulloa OTR/L [id number]

## 2019-05-11 NOTE — PROGRESS NOTES
Physical Therapy    Facility/Department: [facility]  Treatment Note    NAME: Cira Zhneg  : 1922  MRN: [id number]    Date of Service: 2019  Evaluating Therapist: Jed Jeans, DPT    ROOM: [id number]  DIAGNOSIS: Multiple Fractures  PMH: S/p fall on 19 on left side. Xrays of left shoulder demonstrating a displaced scapular body fracture that extends into the glenoid. There is also previous fracture of the proximal humerus at the surgical neck with valgus impaction (left scapular body fracture, left glenoid fracture). Also found to have a left sided pneumothorax with left sided 7th rib fxand had a chest tube placed (removed 19). PMH includes HTN, R hip ORIF     PRECAUTIONS: NWBing L UE, sling when OOB, falls,     Social:  Pt lives alone in a 1 story condo with one step to enter and no HR. Prior to admission pt walked with a Hurrycane. Reported independent with ADLs/IADLs. Actively driving. Son checks in on him readily. Pt. Has a /cook 3days/wk       Initial Evaluation  19  AM      Short Term Goals Long Term Goals    Was pt agreeable to Eval/treatment? yes Yes      Does pt have pain?  No pain Moderate low back pain that improved within session      Bed Mobility  Rolling: sba  Supine to sit: sba  Sit to supine: sba  Scooting: sba Modified Independent  sup Mod I   Transfers Sit to stand: cg/sba  Stand to sit: cg/sba  Stand pivot: cgA with spc Sit<>stand: SBA  Stand pivot: SBA with SPC  Sup with AAD Mod I with AAD   Ambulation    150 feet with cgA/sba with spc 200 feet with SPC with SBA  250 feet with AAD with sup >500 feet with AAD with mod I   Walking 10 feet on uneven surface 10 feet with spc with cg/sba NT      Wheel Chair Mobility n/a NT      Car Transfers CgA/sba with spc SBA  sup Mod I   Stair negotiation: ascended and descended  8 steps with right HR with cg/sba 12 steps with 1 rail with SBA  12 steps with one rail with sup >12 steps with one rail with mod I   Curb Step: ascended and descended 4 inch step with spc and min/modA (LOB to right when descending) 4 inch step with SPC with SBA  4 inch step with AAD and sba 4 inch step with AAD and mod I   Picking up object off the floor Picked up 2# with cgA assist NT      BLE ROM [facility]      BLE Strength 4/5 bilateral LE Grossly 4/5 to 4+/5      Balance  Sitting: Independent   Standing: cgA/sba with spc Sitting: Independent  Dynamic standing: SBA with SPC      Date Family Teach Completed TBA Scheduled 5/15/19 am        Is additional Family Teaching Needed? Y or N Y Yes      Hindering Progress Balance, NWB left UE Balance, NWB LUE      PT recommended ELOS 7-10 days       Team's Discharge Plan        Therapist at Team Meeting          Therapeutic Exercise:   AM:   Supine B glute bridges with blue thera-band resisting B hip abduction x30  Curb step negotiation: 4 inch step x2 with SPC with SBA    Additional Comments: The pt reported low back pain that improved with movement during the session. The pt continues to demonstrate a Trendelenburg gait pattern during R stance phase, glute max/med exercises performed to improve gait pattern. Patient/family education  Pt/family educated on seated nerve glides. Patient/family response to education:   Pt/family verbalized understanding Pt/family demonstrated skill Pt/family requires further education in this area   Yes Yes No     AM  Time in: 0935  Time out: 1005    Pt is making good progress toward established Physical Therapy goals. Continue with physical therapy current plan of care. Edgar Gonzalez.  Janina Fan, [address] [facility]  License Number: [id number]

## 2019-05-12 PROCEDURE — 1280000000 HC REHAB R&B

## 2019-05-12 PROCEDURE — 6370000000 HC RX 637 (ALT 250 FOR IP): Performed by: STUDENT IN AN ORGANIZED HEALTH CARE EDUCATION/TRAINING PROGRAM

## 2019-05-12 PROCEDURE — 6360000002 HC RX W HCPCS: Performed by: PHYSICAL MEDICINE & REHABILITATION

## 2019-05-12 RX ADMIN — ENOXAPARIN SODIUM 40 MG: 40 INJECTION SUBCUTANEOUS at 08:31

## 2019-05-12 RX ADMIN — CARBAMAZEPINE 200 MG: 200 TABLET ORAL at 07:25

## 2019-05-12 RX ADMIN — DOCUSATE SODIUM 100 MG: 100 CAPSULE, LIQUID FILLED ORAL at 20:46

## 2019-05-12 RX ADMIN — TAMSULOSIN HYDROCHLORIDE 0.4 MG: 0.4 CAPSULE ORAL at 16:59

## 2019-05-12 RX ADMIN — FINASTERIDE 5 MG: 5 TABLET, FILM COATED ORAL at 07:25

## 2019-05-12 RX ADMIN — METOPROLOL SUCCINATE 12.5 MG: 25 TABLET, FILM COATED, EXTENDED RELEASE ORAL at 07:25

## 2019-05-12 ASSESSMENT — PAIN SCALES - GENERAL
PAINLEVEL_OUTOF10: 0

## 2019-05-13 PROCEDURE — 97110 THERAPEUTIC EXERCISES: CPT

## 2019-05-13 PROCEDURE — 6370000000 HC RX 637 (ALT 250 FOR IP): Performed by: PHYSICAL MEDICINE & REHABILITATION

## 2019-05-13 PROCEDURE — 1280000000 HC REHAB R&B

## 2019-05-13 PROCEDURE — 97535 SELF CARE MNGMENT TRAINING: CPT

## 2019-05-13 PROCEDURE — 6360000002 HC RX W HCPCS: Performed by: PHYSICAL MEDICINE & REHABILITATION

## 2019-05-13 PROCEDURE — 6370000000 HC RX 637 (ALT 250 FOR IP): Performed by: STUDENT IN AN ORGANIZED HEALTH CARE EDUCATION/TRAINING PROGRAM

## 2019-05-13 PROCEDURE — 97530 THERAPEUTIC ACTIVITIES: CPT

## 2019-05-13 RX ADMIN — FINASTERIDE 5 MG: 5 TABLET, FILM COATED ORAL at 06:42

## 2019-05-13 RX ADMIN — MUPIROCIN: 20 OINTMENT TOPICAL at 13:06

## 2019-05-13 RX ADMIN — ENOXAPARIN SODIUM 40 MG: 40 INJECTION SUBCUTANEOUS at 09:19

## 2019-05-13 RX ADMIN — CARBAMAZEPINE 200 MG: 200 TABLET ORAL at 06:42

## 2019-05-13 RX ADMIN — TAMSULOSIN HYDROCHLORIDE 0.4 MG: 0.4 CAPSULE ORAL at 16:17

## 2019-05-13 RX ADMIN — DOCUSATE SODIUM 100 MG: 100 CAPSULE, LIQUID FILLED ORAL at 09:19

## 2019-05-13 RX ADMIN — METOPROLOL SUCCINATE 12.5 MG: 25 TABLET, FILM COATED, EXTENDED RELEASE ORAL at 06:42

## 2019-05-13 ASSESSMENT — PAIN SCALES - GENERAL
PAINLEVEL_OUTOF10: 0

## 2019-05-13 NOTE — PROGRESS NOTES
obtain goals: Gait Training, Balance Training, Patient/Caregiver Education & Training, Self-Care / ADL, Functional Mobility Training, Equipment Evaluation, Education, & procurement, Home Management Training, Endurance Training       [x] Continue with current OT Plan of care. [] Prepare for Discharge     DISCHARGE RECOMMENDATIONS  Recommended DME:  Sc, tub seat    Post Discharge Care:   []Home Independently  []Home with 24hr Care / Supervision []Home with Partial Supervision []Home with Home Health OT []Home with Out Pt OT []Other: ___   Comments:         Time in Time out Tx Time Breakdown  Variance:   First Session  8:25 9:05 [x] Individual Tx- 40Mins  [] Concurrent Tx -  [] Co-Tx -   [] Group Tx -   [] Time Missed -     Second Session 10:55 11:40 [x] Individual Tx- 45 Mins     [] Concurrent Tx -   [] Co-Tx -   [] Group Tx -   [] Time Missed -             Third Session    [] Individual Tx-   [] Concurrent Tx -  [] Co-Tx -   [] Group Tx -   [] Time Missed -         Total Tx Time: 85 mins    Esther Chakraborty PAIGE/L [id number]    I have read & agree with the above status.     Ramona Ulloa OTR/L [id number]

## 2019-05-13 NOTE — PROGRESS NOTES
Physical Therapy    Facility/Department: [address] REHAB  Weekly Progress Note    NAME: Daniela Valdivia  : 1922  MRN: [id number]    Date of Service: 2019  Evaluating Therapist: Otoniel Aguero DPT    ROOM: [address] [place] [address]  DIAGNOSIS: Multiple Fractures  PMH: S/p fall on 19 on left side. Xrays of left shoulder demonstrating a displaced scapular body fracture that extends into the glenoid. There is also previous fracture of the proximal humerus at the surgical neck with valgus impaction (left scapular body fracture, left glenoid fracture). Also found to have a left sided pneumothorax with left sided 7th rib fxand had a chest tube placed (removed 19). PMH includes HTN, R hip ORIF     PRECAUTIONS: NWBing L UE, sling when OOB, falls,     Social:  Pt lives alone in a 1 story condo with one step to enter and no HR. Prior to admission pt walked with a Hurrycane. Reported independent with ADLs/IADLs. Actively driving. Son checks in on him readily. Pt. Has a /cook 3days/wk       Initial Evaluation  19 Comments   Short Term Goals Long Term Goals    Bed Mobility  Rolling: sba  Supine to sit: sba  Sit to supine: sba  Scooting: sba Rolling: mod I  Supine to sit: mod I  Sit to supine: mod I  Scooting: mod I Log roll technique sup Mod I   Transfers Sit to stand: cg/sba  Stand to sit: cg/sba  Stand pivot: cgA with spc Sit to stand: mod I  Stand to sit: sup  Stand pivot: sbA/sup with SPC occasional cues for hand placement Sup with AAD Mod I with AAD   Ambulation    150 feet with cgA/sba with spc 225' with spc cg/sba +R trendelenberg leading to left deviation  250 feet with AAD with sup >500 feet with AAD with mod I   Wheel Chair Mobility n/a NT      Car Transfers CgA/sba with spc Sup with spc  sup Mod I   Stair negotiation: ascended and descended  8 steps with right HR with cg/sba 12-16 steps with R HR sup (recip.  Patterning)  12 steps with one rail with sup >12 steps with one rail with mod I Curb Step:   ascended and descended 4 inch step with spc and min/modA (LOB to right when descending) 4 inch step with SPC with SBA Verbal cues for  weight shifting 4 inch step with AAD and sba 4 inch step with AAD and mod I   BLE ROM WFL WFL      BLE Strength 4/5 bilateral LE Grossly 4/5 to 4+/5      Balance  Sitting: Independent   Standing: cgA/sba with spc Sitting: Independent  Standing: SBA with SPC      Date Family Teach Completed TBA Scheduled 5/15/19 am        Is additional Family Teaching Needed? Y or N Y Yes      Hindering Progress Balance, NWB left UE Balance, NWB LUE      PT recommended ELOS 7-10 days 5 days      Team's Discharge Plan  Discharge Thursday 5/16/19      Therapist at [facility], Anel Vasquez, PT, DPT [id number]          Date:  5/13/19  Supporting factors: Motivated, demonstrates ability to correct trendelenberg pattern over shorter distances  Barriers to discharge:  Balance, [address] left UE  Additional comments:  Pt able to correct for R tredenelberg/left lateral lean over shorter distances, but with fatigue tending to display deviation to left side. Unable to sequence more supportive [facility]) so continuing to use spc at this time. Balance with mobility using unilateral device (due to [address]) most limiting factor. Reporting generalized weakness, noted pallor/flushed face on 5/13/19 with lightheadedness with standing. Nursing notified. BP relatively unchanged with sit->standing.    DME:  Anticipate spc  After Care:   Home PT    Sree AtkinsIndiana University Health Blackford Hospitalcarson  [id number]

## 2019-05-13 NOTE — PROGRESS NOTES
Physical Therapy    Facility/Department: [facility]SE REHAB  Daily Treatment Note    NAME: Alcon Valentino  : 1922  MRN: [id number]    Date of Service: 2019  Evaluating Therapist: Ramiro Silverio DPT    ROOM: [address] [place] [address]  DIAGNOSIS: Multiple Fractures  PMH: S/p fall on 19 on left side. Xrays of left shoulder demonstrating a displaced scapular body fracture that extends into the glenoid. There is also previous fracture of the proximal humerus at the surgical neck with valgus impaction (left scapular body fracture, left glenoid fracture). Also found to have a left sided pneumothorax with left sided 7th rib fxand had a chest tube placed (removed 19). PMH includes HTN, R hip ORIF     PRECAUTIONS: NWBing L UE, sling when OOB, falls,     Social:  Pt lives alone in a 1 story condo with one step to enter and no HR. Prior to admission pt walked with a Hurrycane. Reported independent with ADLs/IADLs. Actively driving. Son checks in on him readily. Pt. Has a /cook 3days/wk       Initial Evaluation  19  AM   PM   Short Term Goals Long Term Goals    Was pt agreeable to Eval/treatment? yes yes Yes       Does pt have pain?  No pain 4/10 low back pain  4/10 low back pain       Bed Mobility  Rolling: sba  Supine to sit: sba  Sit to supine: sba  Scooting: sba Rolling: mod I  Supine to sit: mod I  Sit to supine: mod I  Scooting: mod I  sup Mod I   Transfers Sit to stand: cg/sba  Stand to sit: cg/sba  Stand pivot: cgA with spc Sit to stand: mod I  Stand to sit: sup  Stand pivot: sbA/sup with SPC Sit to stand: sup  Stand to sit: sba -> modA  Stand pivot: NT    (see comments) Sup with AAD Mod I with AAD   Ambulation    150 feet with cgA/sba with spc 225' with spc sba  250 feet with AAD with sup >500 feet with AAD with mod I   Walking 10 feet on uneven surface 10 feet with spc with cg/sba       Wheel Chair Mobility n/a       Car Transfers CgA/sba with spc sup with spc  sup Mod I   Stair negotiation: ascended and descended  8 steps with right HR with cg/sba 16 steps with R HR sup  12 steps with one rail with sup >12 steps with one rail with mod I   Curb Step:   ascended and descended 4 inch step with spc and min/modA (LOB to right when descending) 4 in curb step x4 with spc sba  4 inch step with AAD and sba 4 inch step with AAD and mod I   Picking up object off the floor Picked up 2# with cgA assist       BLE ROM WFL       BLE Strength 4/5 bilateral LE       Balance  Sitting: Independent   Standing: cgA/sba with spc       Date Family Teach Completed TBA  Scheduled 5/15/19 am       Is additional Family Teaching Needed? Y or N Y       Hindering Progress Balance, NWB left UE       PT recommended ELOS 7-10 days       Team's Discharge Plan        Therapist at Team Meeting          Therapeutic Exercise:   AM: 5x STS transfer at w/c with blue tband to activate R hip abductors during functional tasks x2 reps    PM: 3x STS transfer with purple T BAND to activate R hip abductors during functional tasks    Patient education  Pt educated on safety with mobility, pelvic stabilization during R LE stance    Patient response to education:   Pt verbalized understanding Pt demonstrated skill Pt requires further education in this area   yes Yes with occasional cues yes     Additional Comments: reviewed all mobility as per above. Continues to require occasional cues for sequencing with spc during mobility, as well as compensatory corrections for R trendelenburg pattern. Pt continues to demonstrate occasional narrowing of MADDI with L LE heel strike, leading to stepping outside MADDI to recover balance but able to self correct this date in AM. In PM, pt noted to be flushed in face and reporting feeling \"weak all over. \" Pt reporting slight lightheadedness during initial STS transfers. BP assessed at 149/64 HR 72 in sitting.  BP again assessed in standing at 148/68 HR 73. Pt attempting to stand to complete functional mobility, however when starting to ambulate having complete LOB to right side requiring modA from therapist to correct. Nursing notified of complaints of generalized weakness/flushed face and requesting to return pt to supine. BP assessed at 119/60 HR 74. MD aware. Nursing notified of noted H/H trending down up to previous lab work on 5/7/19. AM  Time in: 0915  Time out: 1000    PM  Time in: 1515  Time out: 1535    Pt is making fair+ progress toward established Physical Therapy goals. Continue with physical therapy current plan of care.     Areli Ardon DPT  [id number]

## 2019-05-13 NOTE — PROGRESS NOTES
Occupational Therapy     05/13/19 1338   Attendance   Activity Discussion/reminisce   Participation Active participation   [place] Demonstrates ability to complete social goals   Social Skills Interacts independently in social activity   Leisure Education Demonstrates knowledge of benefits of leisure involvement  (Identified thinking,talking and remembering as benefits.)   Time Spent With Patient   Minutes 60

## 2019-05-13 NOTE — PROGRESS NOTES
Occupational Therapy     05/13/19 1317   Attendance   Activity Cards   Participation Active participation   [place] Demonstrates ability to complete social goals   Social Skills Interacts independently in social activity   Leisure Education Demonstrates knowledge of benefits of leisure involvement  (Identified exercise and learning as benefits.)   Time Spent With Patient   Minutes 40

## 2019-05-13 NOTE — PROGRESS NOTES
rate.  Breath sounds clear to auscultation. Heart: Normal rate. Regular rhythm. S1 normal and S2 normal.    Abdomen: Abdomen is soft. Bowel sounds are normal.   There is no abdominal tenderness. Extremities: Decreased range of motion. Neurological: Patient is alert. (4/5 str). Functional Assessment:      Date   Status   AE    Comments     Feeding   5/10/19   Sup             Grooming   5/11/19   Mod I       Seated to perform face/hand washing. Bathing   5/11/19   SBA       Pt engaged in UB sponge bathing since declined shower. Pt declined LB bathing since already was done earlier. Occ vc's for NWB LUE and restrictions. UB Dressing   5/11/19   Supervision       Don/doff button up  Short and long sleeve shirts seated in w/c. Robert Shepard LB Dressing   5/11/19    SBA        Pt donned socks using BLE crossover method. Pt able to choco shoes. Homemaking   5/9/19   SBA                  Functional Transfers / Balance:      Date Status DME  Comments   Sit Balance 5/11/19   Supervision   Sitting balance on EOB and up in w/c    Stand Balance 5/11/19   SBA   Standing balance during sit to stand from w/c to sink to readjust shirts and during bed>transfers   [] Tub  [x] Shower   Transfer 5/9/19   SBA SPC, extended tub bench 5/11/19 Pt declined shower not necessary and feeling too cold right now. Commode   Transfer 5/10/19    SBA SPC     Functional   Mobility 5/10/19   SBA SPC     Other:  Bed>WC    5/11/19    SBA   One cue for NWB LUE during EOB to w/c transfers      Functional Exercises / Activity:  Pt completed partial ADL session to increase independence with dressing and bathing tasks.     Sensory / Neuromuscular Re-Education:        Cognitive Skills:    Status Comments   Problem   Solving fair      Memory fair      Sequencing fair      Safety fair               Assessment:    Condition: In stable condition. Improving.   (Mult trauma). Plan:   Encourage ambulation.   (Bactroban to incision  Healing well  str improving  ).        Shane Lucas MD  5/13/2019

## 2019-05-14 ENCOUNTER — APPOINTMENT (OUTPATIENT)
Dept: GENERAL RADIOLOGY | Age: 84
DRG: 561 | End: 2019-05-14
Attending: PHYSICAL MEDICINE & REHABILITATION
Payer: MEDICARE

## 2019-05-14 PROCEDURE — 97530 THERAPEUTIC ACTIVITIES: CPT

## 2019-05-14 PROCEDURE — 97110 THERAPEUTIC EXERCISES: CPT

## 2019-05-14 PROCEDURE — 6360000002 HC RX W HCPCS: Performed by: PHYSICAL MEDICINE & REHABILITATION

## 2019-05-14 PROCEDURE — 71046 X-RAY EXAM CHEST 2 VIEWS: CPT

## 2019-05-14 PROCEDURE — 97535 SELF CARE MNGMENT TRAINING: CPT

## 2019-05-14 PROCEDURE — 1280000000 HC REHAB R&B

## 2019-05-14 PROCEDURE — 6370000000 HC RX 637 (ALT 250 FOR IP): Performed by: STUDENT IN AN ORGANIZED HEALTH CARE EDUCATION/TRAINING PROGRAM

## 2019-05-14 RX ADMIN — ENOXAPARIN SODIUM 40 MG: 40 INJECTION SUBCUTANEOUS at 08:00

## 2019-05-14 RX ADMIN — DOCUSATE SODIUM 100 MG: 100 CAPSULE, LIQUID FILLED ORAL at 21:04

## 2019-05-14 RX ADMIN — TAMSULOSIN HYDROCHLORIDE 0.4 MG: 0.4 CAPSULE ORAL at 17:36

## 2019-05-14 RX ADMIN — METOPROLOL SUCCINATE 12.5 MG: 25 TABLET, FILM COATED, EXTENDED RELEASE ORAL at 06:55

## 2019-05-14 RX ADMIN — CARBAMAZEPINE 200 MG: 200 TABLET ORAL at 06:55

## 2019-05-14 RX ADMIN — MUPIROCIN: 20 OINTMENT TOPICAL at 08:03

## 2019-05-14 RX ADMIN — DOCUSATE SODIUM 100 MG: 100 CAPSULE, LIQUID FILLED ORAL at 08:00

## 2019-05-14 RX ADMIN — FINASTERIDE 5 MG: 5 TABLET, FILM COATED ORAL at 06:55

## 2019-05-14 ASSESSMENT — PAIN SCALES - GENERAL
PAINLEVEL_OUTOF10: 0

## 2019-05-14 NOTE — PROGRESS NOTES
Occupational Therapy     05/14/19 1828   Attendance   Activity Pet therapy;Games   Participation Active participation   Therapeutic Recreation   Community Reintegration Demonstrates ability to complete social goals   Social Skills Interacts independently in social activity   Leisure Education Demonstrates knowledge of benefits of leisure involvement  (Raven Barroso stated\"Relaxing and learned a new game\" as benefits.)   Time Spent With Patient   Minutes 36

## 2019-05-14 NOTE — PROGRESS NOTES
Ashly Craig is a 80 y.o. male patient. Current Facility-Administered Medications   Medication Dose Route Frequency Provider Last Rate Last Dose    mupirocin (BACTROBAN) 2 % ointment   Topical Daily Hlil Newman MD        enoxaparin (LOVENOX) injection 40 mg  40 mg Subcutaneous Daily Hill Newman MD   40 mg at 05/14/19 0800    methocarbamol (ROBAXIN) tablet 500 mg  500 mg Oral 4x Daily PRN Aixa Valentino MD        docusate sodium (COLACE) capsule 100 mg  100 mg Oral BID Harman B Capal, DO   100 mg at 05/14/19 0800    carBAMazepine (TEGRETOL) tablet 200 mg  200 mg Oral QAM AC Harman B Capal, DO   200 mg at 05/14/19 0655    finasteride (PROSCAR) tablet 5 mg  5 mg Oral QAM AC Harman B Capal, DO   5 mg at 05/14/19 9901    metoprolol succinate (TOPROL XL) extended release tablet 12.5 mg  12.5 mg Oral QAM AC Harman B Capal, DO   12.5 mg at 05/14/19 0655    tamsulosin (FLOMAX) capsule 0.4 mg  0.4 mg Oral Dinner Harman B Capal, DO   0.4 mg at 05/13/19 1617    acetaminophen (TYLENOL) tablet 650 mg  650 mg Oral Q4H PRN Aixa Valentino MD   650 mg at 05/11/19 2112    magnesium hydroxide (MILK OF MAGNESIA) 400 MG/5ML suspension 30 mL  30 mL Oral Daily PRN Aixa Valentino MD         No Known Allergies  Active Problems:    Multiple fractures  Resolved Problems:    * No resolved hospital problems. *    Blood pressure 122/60, pulse 68, temperature 98.3 °F (36.8 °C), temperature source Temporal, resp. rate 16, height 6' (1.829 m), weight 156 lb 11.2 oz (71.1 kg), SpO2 93 %. Subjective:  Symptoms:  Stable. He reports weakness. Diet:  Adequate intake. Activity level: Impaired due to weakness. Pain:  He complains of pain that is mild. Objective:  General Appearance: In no acute distress. Vital signs: (most recent): Blood pressure 122/60, pulse 68, temperature 98.3 °F (36.8 °C), temperature source Temporal, resp.  rate 16, height 6' (1.829 m), weight 156 lb 11.2 oz (71.1 kg), SpO2 93 %.  Vital signs are normal.    Output: Producing urine and producing stool. Lungs:  Normal effort and normal respiratory rate. Breath sounds clear to auscultation. Heart: Normal rate. Regular rhythm. S1 normal and S2 normal.    Abdomen: Abdomen is soft. Bowel sounds are normal.   There is no abdominal tenderness. Extremities: Decreased range of motion. Neurological: Patient is alert. (4/5 str). Assessment:    Condition: In stable condition. Improving.   (Multiple trauma). Plan:   Encourage ambulation. (Tolerating therapy well  Pain well controlled  Will review progress in team today).        Marlin Tan MD  5/14/2019

## 2019-05-14 NOTE — PLAN OF CARE
Problem: Increased nutrient needs (NI-5.1)  Goal: Food and/or Nutrient Delivery  Description continue current diet and ONS   Individualized approach for food/nutrient provision.   Outcome: Met This Shift

## 2019-05-14 NOTE — PROGRESS NOTES
Nutrition Assessment    Type and Reason for Visit: Initial    Nutrition Recommendations: Continue current diet as ordered. Will continue to moniotr and follow. Nutrition Assessment: Patient at risk for nutritional compromise d/t admit to [address] s/p fall w/ pneumothorax and multiple fx increasing patient's nutrient needs. PO intakes have remained ~50-75% of most meals. Will continue to monitor and follow patient     Malnutrition Assessment:  · Malnutrition Status: At risk for malnutrition  · Context: Acute illness or injury  · Findings of the 6 clinical characteristics of malnutrition (Minimum of 2 out of 6 clinical characteristics is required to make the diagnosis of moderate or severe Protein Calorie Malnutrition based on AND/ASPEN Guidelines):  1. Energy Intake-Less than or equal to 75% of estimated energy requirement, Greater than or equal to 7 days    2. Weight Loss-Unable to assess(d/t no actual wts per EMR ),    3. Fat Loss-No significant subcutaneous fat loss,    4. Muscle Loss-No significant muscle mass loss,    5. Fluid Accumulation-No significant fluid accumulation,    6.  Strength-Not measured    Nutrition Risk Level:  Moderate    Nutrient Needs:  · Estimated Daily Total Kcal: 9068-5170(MSJ: 1377 x 1.2 )  · Estimated Daily Protein (g): (1.3-1.5)  · Estimated Daily Total Fluid (ml/day): 1600ml     Nutrition Diagnosis:   · Problem: Increased nutrient needs  · Etiology: related to Increased demand for energy/nutrients(2/2 healing )     Signs and symptoms:  as evidenced by Presence of wounds    Objective Information:  · Nutrition-Focused Physical Findings: +I/Os, intermittent confusion noted, soft abd, active b,s constipation noted, trace BLE edema   · Wound Type: Surgical Wound(to chest )  · Current Nutrition Therapies:  · Oral Diet Orders: General   · Oral Diet intake: 51-75%(average )  · Oral Nutrition Supplement (ONS) Orders: None  · Anthropometric Measures:  · Ht: 6' (182.9 cm) · Current Body Wt: 156 lb (70.8 kg)(5/6 actual bedscale- SIMONE updated wt 2/2 pt in chair during visit )  · Usual Body Wt: (no wt hx per EMR )  · % Weight Change:  ,  SIMONE d.t no wt hx per EMR   · Ideal Body Wt: 172 lb (78 kg), % Ideal Body 88%  · BMI Classification: BMI 18.5 - 24.9 Normal Weight    Nutrition Interventions:   Continue current diet  Continued Inpatient Monitoring, Coordination of Care, No recommendations at this time    Nutrition Evaluation:   · Evaluation: Goals set   · Goals: consume 75% or more of most meals/oNS    · Monitoring: Meal Intake, Diet Tolerance, Skin Integrity, Wound Healing, I&O, Mental Status/Confusion, Pertinent Labs, Weight, Monitor Bowel Function      Electronically signed by Leonardo Mosquera RD, LD on 5/14/19 at 4:11 PM    Contact Number: [phone]

## 2019-05-14 NOTE — PROGRESS NOTES
Physical Therapy    Facility/Department: [facility] REH[facility]  Daily Treatment Note    NAME: Aaliyah Jose  : 1922  MRN: [id number]    Date of Service: 2019  Evaluating Therapist: Gordon Polk DPT    ROOM: [id number]  DIAGNOSIS: Multiple Fractures  PMH: S/p fall on 19 on left side. Xrays of left shoulder demonstrating a displaced scapular body fracture that extends into the glenoid. There is also previous fracture of the proximal humerus at the surgical neck with valgus impaction (left scapular body fracture, left glenoid fracture). Also found to have a left sided pneumothorax with left sided 7th rib fxand had a chest tube placed (removed 19). PMH includes HTN, R hip ORIF     PRECAUTIONS: NWBing L UE, sling when OOB, falls,     Social:  Pt lives alone in a 1 story condo with one step to enter and no HR. Prior to admission pt walked with a Hurrycane. Reported independent with ADLs/IADLs. Actively driving. Son checks in on him readily. Pt. Has a /cook 3days/wk       Initial Evaluation  19  AM   PM   Short Term Goals Long Term Goals    Was pt agreeable to Eval/treatment? yes yes Yes       Does pt have pain?  No pain 4/10 low back pain  4/10 low back pain       Bed Mobility  Rolling: sba  Supine to sit: sba  Sit to supine: sba  Scooting: sba Rolling: mod I  Supine to sit: mod I  Sit to supine: mod I  Scooting: mod I Rolling: mod I  Supine to sit: mod I  Sit to supine: mod I  Scooting: mod I sup Mod I   Transfers Sit to stand: cg/sba  Stand to sit: cg/sba  Stand pivot: cgA with spc Sit to stand: mod I  Stand to sit: sup  Stand pivot: sbA with SPC Sit to stand: mod I  Stand to sit: sba/sup  Stand pivot: sba with spc     Sup with AAD Mod I with AAD   Ambulation    150 feet with cgA/sba with spc 225' with spc cg/sba 200' x2 with spc sba 250 feet with AAD with sup >500 feet with AAD with mod I   Walking 10 feet on uneven surface 10 feet with spc with cg/sba       Wheel Chair Mobility n/a Car Transfers CgA/sba with spc sup with spc NT sup Mod I   Stair negotiation: ascended and descended  8 steps with right HR with cg/sba 12 steps with R HR sup 16 steps with R HR sup  12 steps with one rail with sup >12 steps with one rail with mod I   Curb Step:   ascended and descended 4 inch step with spc and min/modA (LOB to right when descending) 4 in curb step x4 with spc cg/sba 4 in curb step x4 with spc sba 4 inch step with AAD and sba 4 inch step with AAD and mod I   Picking up object off the floor Picked up 2# with cgA assist       BLE ROM WFL       BLE Strength 4/5 bilateral LE       Balance  Sitting: Independent   Standing: cgA/sba with spc       Date Family Teach Completed TBA FT with son 5/14/19            Is additional Family Teaching Needed? Y or N Y       Hindering Progress Balance, NWB left UE       PT recommended ELOS 7-10 days       Team's Discharge Plan        Therapist at Team Meeting          Therapeutic Exercise:   AM: 10x STS transfer with purple T Band to increase hip abductor activation during functional tasks  Step ups: no UE support in // bars with 2 In curb step x10 reps, alternating to exaggerate right shifting/SLS/pelvic stability     PM: 10x STS transfer with purple T Band to increase hip abductor activation during functional tasks    Patient education  Pt and son educated on safety with mobility, balance deficits, compensatory balance strategies, sequencing with curb step negotiation for safety    Patient response to education:   Pt verbalized understanding Pt demonstrated skill Pt requires further education in this area   yes Yes  Reinforcement      Additional Comments: Son present for FT. Son educated extensively on balance and safety concerns, as well as guarding techniques with all mobility to prevent LOB. Son present and verbalizing understanding of safety concerns, however was hesitant providing guarding with mobility.  Son and patient aware of need for 24 hour supervision

## 2019-05-14 NOTE — PROGRESS NOTES
Occupational Therapy  OCCUPATIONAL THERAPY DAILY NOTE    Date:2019  Patient Name: Cira Zheng  MRN: [id number]  : 1922  Room: [address] [place] [address]     Diagnosis: s/p fall- rib fx L, L pneumothorax, L scapula fx  Precautions: Fall Risk, Seizure, NWB LUE, sling LUE    Functional Assessment:   Date Status AE  Comments   Feeding 5/10/19 Sup     Grooming 19 Mod I  Seated at sink pt brushed teeth and washed face. Bathing 19 SBA  Bathing refused this date. UB Dressing 19 Supervision  Don/doff button up shirts. S for stand balance. LB Dressing 19  SBA   Don/doff brief, pants, socks and backless slippers. VCs for technique for increased independence. Homemaking 19 SBA        Functional Transfers / Balance:   Date Status DME  Comments   Sit Balance 19 Supervision     Stand Balance 19 SBA-CGA  Level of assist varies during activities. [] Tub  [x] Shower   Transfer 19 SBA SPC, extended tub bench    Commode   Transfer 19  SBA SPC    Functional   Mobility 19 SBA SPC    Other:  Bed, kitchen chair without arms, couch, recliner   19   SBA   SPC      Functional Exercises / Activity:  Leisure task with focus on stand balance, stand tolerance, endurance and RUE strength. Pt requires Close S/SBA for stand balance throughout with occasional VCs to adhere to weight bearing restrictions to LUE. Pt completes X 3 stands with fair stand tolerance. Shoulder flexion exercises completed with 1# wrist weight to RUE 3 X 10. Ring tree activity to increase balance and safety awareness. Pt retrieved rings from the floor using reacher and placed onto table top. Picking up rings from table top pt placed on various levels of ring tree. SBA-CGA for safety with VCs for safe technique. 3# free weight 3 X 10 to increase RUE strength shoulder flexion, Scapular protraction/retraction, elbow flexion for increased independence with functional tasks.      Standing at raised table top pt completed shoulder ladder with 2# weight to increase RUE strength, stand balance, stand tolerance and endurance. Pt completed with fair- stand tolerance and endurance with SBA for safety. Leisure task standing at raised table top with focus on stand tolerance and stand balance. Pt completed X 3 stands and with fair- stand tolerance and SBA for safety. Sensory / Neuromuscular Re-Education:      Cognitive Skills:   Status Comments   Problem   Solving fair     Memory fair     Sequencing fair     Safety fair       Visual Perception:    Education:  Pt educated on increased safety during UB and LB dressing. [x] Family teach completed on: 5/14/19  Pt's son present for family teach this date and observed transfers throughout functional living environment at [facility] [place] for safety. Pt's son provided VCs and demo'd fair+ skill ambulating with pt. Pt states he has assistance 3 days a week to cook meals in the evening and once a month someone to clean his home. Pain Level: Additional Notes:   5/11/19- Nurse made aware patient declined a shower and told this therapist he already had briefs changed. Patient has made good  progress during treatment sessions toward set goals. Therapy emphasis to obtain goals: Gait Training, Balance Training, Patient/Caregiver Education & Training, Self-Care / ADL, Functional Mobility Training, Equipment Evaluation, Education, & procurement, Home Management Training, Endurance Training       [x] Continue with current OT Plan of care.   [] Prepare for Discharge     DISCHARGE RECOMMENDATIONS  Recommended DME:  Sc, tub seat    Post Discharge Care:   []Home Independently  []Home with 24hr Care / Supervision []Home with Partial Supervision []Home with Home Health OT []Home with Out Pt OT []Other: ___   Comments:         Time in Time out Tx Time Breakdown  Variance:   First Session  10:45 11:30 [x] Individual Tx- 45Mins  [] Concurrent Tx -  [] Co-Tx -   [] Group Tx -   [] Time Missed -     Second Session 1:00 1:45 [] Individual Tx-     [x] Concurrent Tx -  45 Mins  [] Co-Tx -   [] Group Tx -   [] Time Missed -             Third Session    [] Individual Tx-   [] Concurrent Tx -  [] Co-Tx -   [] Group Tx -   [] Time Missed -         Total Tx Time: 90 mins    Debbi Jackson PAIGE/L [id number]      I have read & agree with the above status.     Tal Shaffer OTR/L [id number]

## 2019-05-14 NOTE — PATIENT CARE CONFERENCE
[address] [place] [address]  ACUTE REHABILITATION  TEAM CONFERENCE NOTE/PATIENT PLAN OF CARE    Date: 2019  Admission date: 2019  Patient Name: Shabnam Cruz        MRN: [id number]    : 1922  (80 y.o.)  Gender: male   Rehab diagnosis/surgery with date:  Left displaced scapula fracture extended into glenoid, multiple left rib fractures post fall    Impairment Group Code:  8.9      MEDICAL/FUNCTIONAL HISTORY/STATUS:  pain under good control    Consultations/Labs/X-rays: will check CBC again, chest x-ray today      MEDICATION UPDATE:  no recent med changes      NURSING FIMS:    Bowel:   Current level: Modified independent  Short term bowel goal:  Modified independent  Long term bowel goal: Modified independent    Bladder:   Current level: supervision with a urinal  Short term bladder goal: Modified independent  Long term bladder goal: Modified independent    Toilet Hygiene:   Current level : supervision  Short term Toilet hygiene goal: Modified independent  Long term toilet hygiene goal:  Modified independent    Skin integrity: chest  tub site has purulent drainage, topical bactroban ordered, left leg abrasion  Pain: none    NUTRITION    Diet  general  Liquid consistency   thin    SOCIAL INFORMATION:  Lives with: alone  Prior community services:  none  Home Architecture:  condo, 1 entry, no rail, all 1 level  Prior Level of function:  has 3 kids, independent, with quad cane, retired, has a  3 days per week, she cooked  DME:  used elevated toilet seat, shower chair, quad cane      FAMILY / PATIENT EDUCATION:  son in today for education    PHYSICAL THERAPY    Bed mobility:   Current level: Modified independent  Short term bed mobility goal: Modified independent  Long term bed mobility goal: Modified independent    Chair/bed transfers:  Current level: Modified independent-supervision with a straight cane and verbal cues  Short term Chair/bed transfers goal: supervision  Long term Chair/bed transfers goal: Modified independent      Ambulation:   Current level: 225 ft straight cane  at standby assist-Contact guard assist, balance impaired  Short term ambulation goal: 250 ft supervision  Long term ambulation goal: 500 ft at Modified independent    Car transfers:   Current level: supervision  Short term car transfers goal: supervision  Long term car transfers goal:Modified independent    Stairs:   Current level : 12 -16 with 1 rail at supervision  Short term stairs goal: met  Long term stairs goal: 12 at Modified independent    Lower Extremity Strength Issues:  4/5 to 4+/5 bilaterally    Other comments: stand balance is standby assist with a straight cane      OCCUPATIONAL THERAPY:      Tub/shower:   Current level: standby assist to walk in shower with bench  Short term tub/shower goal: supervision  Long term tub/shower goal: supervision      Feeding:  Current level: Modified independent  Short term feeding goal: Modified independent  Long term feeding goal: Modified independent    Grooming:   Current level: Modified independent  Short term grooming goal: Modified independent  Long term grooming goal: Modified independent    Bathing:  Current level: standby assist  Short term bathing goal: standby assist  Long term bathing goal: standby assist    Homemaking:   Current level: standby assist, non-wt bearing left upper  Short term homemaking goal: supervision  Long term homemaking goal: supervision    Upper body dressing:  Current level: supervision   Short term upper body dressing goal: Modified independent  Long term upper body dressing goal: Modified independent    Lower body dressing:  Current level: standby assist  Short term lower body dressing goal: Modified independent  Long term lower body dressing goal: Modified independent    Toilet transfer:   Current level: standby assist  Short term toilet transfer goal: Modified independent  Long term toilet transfer goal: Modified independent    Upper body strength issues: non wt bearing left upper      Social interaction:  Modified independent    Safety awareness: fair      Patient/family's personal goals: \"get back home\"  Factors supporting goal achievement:  making gains  Factors hindering goal achievement:  impaired balance      Discharge Plan   Estimated Length of Stay: Thursday 5/16            Destination: home  Services at Discharge: home health services for PT, OT, aide  Equipment at Discharge: cane-already has      INTERDISCIPLINARY TEAM/PHYSICIAN RECOMMENDATION AND/OR REVISIONS OF PLAN OF CARE:  family ed today, finalize discharge for 5/16/19      I approve the established interdisciplinary plan of care as documented within the medical record of Lovenia Husbands. Please see team conference signature page for those in attendance.     Electronically signed by Vicki Garcia RN on 5/14/2019 at 8:57 AM

## 2019-05-15 LAB
ANION GAP SERPL CALCULATED.3IONS-SCNC: 12 MMOL/L (ref 7–16)
BUN BLDV-MCNC: 16 MG/DL (ref 8–23)
CALCIUM SERPL-MCNC: 8.8 MG/DL (ref 8.6–10.2)
CHLORIDE BLD-SCNC: 105 MMOL/L (ref 98–107)
CO2: 23 MMOL/L (ref 22–29)
CREAT SERPL-MCNC: 0.8 MG/DL (ref 0.7–1.2)
GFR AFRICAN AMERICAN: >60
GFR NON-AFRICAN AMERICAN: >60 ML/MIN/1.73
GLUCOSE BLD-MCNC: 95 MG/DL (ref 74–99)
HCT VFR BLD CALC: 37 % (ref 37–54)
HEMOGLOBIN: 12.3 G/DL (ref 12.5–16.5)
MCH RBC QN AUTO: 32.2 PG (ref 26–35)
MCHC RBC AUTO-ENTMCNC: 33.2 % (ref 32–34.5)
MCV RBC AUTO: 96.9 FL (ref 80–99.9)
PDW BLD-RTO: 14.2 FL (ref 11.5–15)
PLATELET # BLD: 262 E9/L (ref 130–450)
PMV BLD AUTO: 8.7 FL (ref 7–12)
POTASSIUM SERPL-SCNC: 4.2 MMOL/L (ref 3.5–5)
RBC # BLD: 3.82 E12/L (ref 3.8–5.8)
SODIUM BLD-SCNC: 140 MMOL/L (ref 132–146)
WBC # BLD: 6.6 E9/L (ref 4.5–11.5)

## 2019-05-15 PROCEDURE — 6360000002 HC RX W HCPCS: Performed by: PHYSICAL MEDICINE & REHABILITATION

## 2019-05-15 PROCEDURE — 36415 COLL VENOUS BLD VENIPUNCTURE: CPT

## 2019-05-15 PROCEDURE — 97110 THERAPEUTIC EXERCISES: CPT

## 2019-05-15 PROCEDURE — 80048 BASIC METABOLIC PNL TOTAL CA: CPT

## 2019-05-15 PROCEDURE — 6370000000 HC RX 637 (ALT 250 FOR IP): Performed by: STUDENT IN AN ORGANIZED HEALTH CARE EDUCATION/TRAINING PROGRAM

## 2019-05-15 PROCEDURE — 97530 THERAPEUTIC ACTIVITIES: CPT

## 2019-05-15 PROCEDURE — 85027 COMPLETE CBC AUTOMATED: CPT

## 2019-05-15 PROCEDURE — 1280000000 HC REHAB R&B

## 2019-05-15 PROCEDURE — 6370000000 HC RX 637 (ALT 250 FOR IP): Performed by: PHYSICAL MEDICINE & REHABILITATION

## 2019-05-15 RX ORDER — FUROSEMIDE 20 MG/1
20 TABLET ORAL DAILY
Status: DISCONTINUED | OUTPATIENT
Start: 2019-05-15 | End: 2019-05-16 | Stop reason: HOSPADM

## 2019-05-15 RX ADMIN — TAMSULOSIN HYDROCHLORIDE 0.4 MG: 0.4 CAPSULE ORAL at 17:54

## 2019-05-15 RX ADMIN — MUPIROCIN: 20 OINTMENT TOPICAL at 08:50

## 2019-05-15 RX ADMIN — DOCUSATE SODIUM 100 MG: 100 CAPSULE, LIQUID FILLED ORAL at 21:05

## 2019-05-15 RX ADMIN — DOCUSATE SODIUM 100 MG: 100 CAPSULE, LIQUID FILLED ORAL at 08:49

## 2019-05-15 RX ADMIN — FUROSEMIDE 20 MG: 20 TABLET ORAL at 11:48

## 2019-05-15 RX ADMIN — CARBAMAZEPINE 200 MG: 200 TABLET ORAL at 06:22

## 2019-05-15 RX ADMIN — ENOXAPARIN SODIUM 40 MG: 40 INJECTION SUBCUTANEOUS at 08:49

## 2019-05-15 RX ADMIN — FINASTERIDE 5 MG: 5 TABLET, FILM COATED ORAL at 06:23

## 2019-05-15 RX ADMIN — METOPROLOL SUCCINATE 12.5 MG: 25 TABLET, FILM COATED, EXTENDED RELEASE ORAL at 06:22

## 2019-05-15 ASSESSMENT — PAIN SCALES - GENERAL
PAINLEVEL_OUTOF10: 0
PAINLEVEL_OUTOF10: 0

## 2019-05-15 NOTE — PROGRESS NOTES
Aaliyah Jose is a 80 y.o. male patient. Current Facility-Administered Medications   Medication Dose Route Frequency Provider Last Rate Last Dose    furosemide (LASIX) tablet 20 mg  20 mg Oral Daily Hill Newman MD        mupirocin (BACTROBAN) 2 % ointment   Topical Daily Hill Newman MD        enoxaparin (LOVENOX) injection 40 mg  40 mg Subcutaneous Daily Hill Newman MD   40 mg at 05/14/19 0800    methocarbamol (ROBAXIN) tablet 500 mg  500 mg Oral 4x Daily PRN Nicole Genao MD        docusate sodium (COLACE) capsule 100 mg  100 mg Oral BID Harman B Capal, DO   100 mg at 05/14/19 2104    carBAMazepine (TEGRETOL) tablet 200 mg  200 mg Oral QAM AC Harman B Capal, DO   200 mg at 05/15/19 0622    finasteride (PROSCAR) tablet 5 mg  5 mg Oral QAM AC Harman B Capal, DO   5 mg at 05/15/19 6189    metoprolol succinate (TOPROL XL) extended release tablet 12.5 mg  12.5 mg Oral QAM AC Harman B Capal, DO   12.5 mg at 05/15/19 0622    tamsulosin (FLOMAX) capsule 0.4 mg  0.4 mg Oral Dinner Harman B Capal, DO   0.4 mg at 05/14/19 1736    acetaminophen (TYLENOL) tablet 650 mg  650 mg Oral Q4H PRN Nicole Genao MD   650 mg at 05/11/19 2112    magnesium hydroxide (MILK OF MAGNESIA) 400 MG/5ML suspension 30 mL  30 mL Oral Daily PRN Nicole Genao MD         No Known Allergies  Active Problems:    Multiple fractures  Resolved Problems:    * No resolved hospital problems. *    Blood pressure (!) 152/72, pulse 65, temperature 97.5 °F (36.4 °C), resp. rate 18, height 6' (1.829 m), weight 156 lb 11.2 oz (71.1 kg), SpO2 94 %. Subjective:  Symptoms:  Stable. He reports weakness. Diet:  Adequate intake. Activity level: Impaired due to weakness. Pain:  He complains of pain that is mild. Objective:  General Appearance: In no acute distress. Vital signs: (most recent): Blood pressure (!) 152/72, pulse 65, temperature 97.5 °F (36.4 °C), resp.  rate 18, height 6' (1.829 m), weight 156 lb 11.2 oz (71.1 kg), SpO2 94 %. Vital signs are normal.    Output: Producing urine and producing stool. Lungs:  Normal effort and normal respiratory rate. Breath sounds clear to auscultation. Heart: Normal rate. Regular rhythm. S1 normal and S2 normal.    Abdomen: Abdomen is soft. Bowel sounds are normal.   There is no abdominal tenderness. Extremities: Decreased range of motion. Neurological: Patient is alert. Functional Assessment:      Date   Status   AE    Comments     Feeding   5/10/19   Sup             Grooming   5/13/19   Mod I       Seated at sink pt brushed teeth and washed face. Bathing   5/11/19   SBA       Bathing refused this date. UB Dressing   5/13/19   Supervision       Don/doff button up shirts. S for stand balance. LB Dressing   5/13/19    SBA        Don/doff brief, pants, socks and backless slippers. VCs for technique for increased independence. Homemaking   5/9/19   SBA                  Functional Transfers / Balance:      Date Status DME  Comments   Sit Balance 5/11/19   Supervision       Stand Balance 5/14/19   SBA-CGA   Level of assist varies during activities. [] Tub  [x] Shower   Transfer 5/14/19   SBA SPC, extended tub bench     Commode   Transfer 5/14/19    SBA SPC     Functional   Mobility 5/14/19   SBA SPC     Other:  Bed, kitchen chair without arms, couch, recliner    5/14/19    SBA    SPC              Assessment:    Condition: In stable condition. Improving.   (Mult trauma). Plan:   Encourage ambulation. ([place] therapy well  Planning d.c tomorrow  Chest x-ray just showed a small effusion  Some evidence of congestive failure  Will start on Lasix).        Steffanie Diaz MD  5/15/2019

## 2019-05-15 NOTE — PROGRESS NOTES
Physical Therapy    Facility/Department: [facility]  Daily Treatment Note    NAME: Chicho Levy  : 1922  MRN: [id number]    Date of Service: 5/15/2019  Evaluating Therapist: Tesha Lott DPT    ROOM: [address] [place] [address]  DIAGNOSIS: Multiple Fractures  PMH: S/p fall on 19 on left side. Xrays of left shoulder demonstrating a displaced scapular body fracture that extends into the glenoid. There is also previous fracture of the proximal humerus at the surgical neck with valgus impaction (left scapular body fracture, left glenoid fracture). Also found to have a left sided pneumothorax with left sided 7th rib fxand had a chest tube placed (removed 19). PMH includes HTN, R hip ORIF     PRECAUTIONS: NWBing L UE, sling when OOB, falls,     Social:  Pt lives alone in a 1 story condo with one step to enter and no HR. Prior to admission pt walked with a Hurrycane. Reported independent with ADLs/IADLs. Actively driving. Son checks in on him readily. Pt. Has a /cook 3days/wk       Initial Evaluation  19  AM   PM Short Term Goals Long Term Goals    Was pt agreeable to Eval/treatment? yes yes yes     Does pt have pain?  No pain No c/o pain  No pain      Bed Mobility  Rolling: sba  Supine to sit: sba  Sit to supine: sba  Scooting: sba Rolling: mod I  Supine to sit: mod I  Sit to supine: NT  Scooting: mod I Rolling: mod I  Supine to sit: mod I  Sit to supine: mod I  Scooting: mod I sup Mod I   Transfers Sit to stand: cg/sba  Stand to sit: cg/sba  Stand pivot: cgA with spc Sit to stand: mod I  Stand to sit: Sup  Stand pivot: SBA with SPC Sit to stand: mod I  Stand to sit: Sup  Stand pivot: SBA with SPC Sup with AAD Mod I with AAD   Ambulation    150 feet with cgA/sba with spc 200' x2 reps with spc with ' with spc sba  100' x2 with spc sba 250 feet with AAD with sup >500 feet with AAD with mod I   Walking 10 feet on uneven surface 10 feet with spc with cg/sba 10 feet with spc with SBA      Wheel Chair Mobility n/a       Car Transfers CgA/sba with spc NT sup sup Mod I   Stair negotiation: ascended and descended  8 steps with right HR with cg/sba 12 steps with R HR sup 12 steps with R sided HR sup 12 steps with one rail with sup >12 steps with one rail with mod I   Curb Step:   ascended and descended 4 inch step with spc and min/modA (LOB to right when descending) 4 in curb step with spc SBA 4 in curb step with spc sba 4 inch step with AAD and sba 4 inch step with AAD and mod I   Picking up object off the floor Picked up 2# with cgA assist       BLE ROM WFL       BLE Strength 4/5 bilateral LE       Balance  Sitting: Independent   Standing: cgA/sba with spc       Date Family Teach Completed TBA FT with son 5/14/19          Is additional Family Teaching Needed? Y or N Y       Hindering Progress Balance, NWB left UE       PT recommended ELOS 7-10 days       Team's Discharge Plan        Therapist at Team Meeting          Therapeutic Exercise:   AM:   Ambulation on uneven terrain x3 reps with spc with SBA  4 inch curb step x3 reps with spc with SBA  Weaving QCs x4 reps with spc with SBA  4\" step ups alternating leading LE with no UE support x10 reps with SBA    PM:  STS 5x with purple tband with hip abductor activation during functional mobility x2 rep    Patient education  Pt educated on RUE placement during stand to sit transfer    Patient response to education:   Pt verbalized understanding Pt demonstrated skill Pt requires further education in this area   yes Yes with cues Reinforcement      Additional Comments: Pt had one LOB requiring min A to correct while ambulating on uneven terrain. Pt required cues for RUE placement/usage during stand to sit transfer with fair carryover during tx. Reviewed all mobility as per above in PM. Continued to emphasize safety and compensatory strategies during mobility. Occasional cues for hand placement when sitting. Planning to d/c home tomorrow.  Discussed not driving upon d/c with patient hesitant to respond that he is not planning on driving.  Education provided on recommendation to not drive and have son assist.     AM  Time in: 1000  Time out: 1045    PM  Time In: 52 [facility]  Time Out: 87 Carolynn Downey DPT  [id number]  (PM)

## 2019-05-15 NOTE — PROGRESS NOTES
Occupational Therapy  OCCUPATIONAL THERAPY DAILY NOTE    Date:5/15/2019  Patient Name: Shiv Otto  MRN: [id number]  : 1922  Room: [address] [place] [address]     Diagnosis: s/p fall- rib fx L, L pneumothorax, L scapula fx  Precautions: Fall Risk, Seizure, NWB LUE, sling LUE    Functional Assessment:   Date Status AE  Comments   Feeding 5/10/19 Sup     Grooming 19 Mod I     Bathing 19 SBA     UB Dressing 19 Supervision     LB Dressing 19  SBA      Homemaking 19 SBA        Functional Transfers / Balance:   Date Status DME  Comments   Sit Balance 19 Supervision     Stand Balance 19 SBA-CGA     [] Tub  [x] Shower   Transfer 19 SBA SPC, extended tub bench    Commode   Transfer 19  SBA SPC    Functional   Mobility 19 SBA SPC    Other:  Bed, kitchen chair without arms, couch, recliner   19   SBA   SPC      Functional Exercises / Activity:  Ergometer 2 X 6 Mins forward/backward with X 3 Min rest break to increase BUE strength/ROM and endurance for independence with functional tasks and transfers. 2# free weight exercise to RUE 3 X 15 reps to increase strength and endurance for independence with functional tasks. Dynamic stand balance activity completed to increase stand balance, safety awareness and endurance. Pt stood and threw bean bags into bucket and then retrieved bean bags from the floor using reacher. Pt completed with SBA/CGA for safety and Min VCs for safe technique. X 2 rest breaks required during activity. Sensory / Neuromuscular Re-Education:      Cognitive Skills:   Status Comments   Problem   Solving fair     Memory fair     Sequencing fair     Safety fair       Visual Perception:    Education:  Pt educated on extended tub bench purpose and for safety. [x] Family teach completed on: 19  Pt's son present for family teach this date and observed transfers throughout functional living environment at [place] [address] for safety.  Pt's son provided VCs and

## 2019-05-16 VITALS
OXYGEN SATURATION: 93 % | HEIGHT: 72 IN | SYSTOLIC BLOOD PRESSURE: 135 MMHG | DIASTOLIC BLOOD PRESSURE: 85 MMHG | RESPIRATION RATE: 15 BRPM | HEART RATE: 69 BPM | BODY MASS INDEX: 21.22 KG/M2 | TEMPERATURE: 97.6 F | WEIGHT: 156.7 LBS

## 2019-05-16 PROCEDURE — 97535 SELF CARE MNGMENT TRAINING: CPT

## 2019-05-16 PROCEDURE — 6360000002 HC RX W HCPCS: Performed by: PHYSICAL MEDICINE & REHABILITATION

## 2019-05-16 PROCEDURE — 6370000000 HC RX 637 (ALT 250 FOR IP): Performed by: STUDENT IN AN ORGANIZED HEALTH CARE EDUCATION/TRAINING PROGRAM

## 2019-05-16 PROCEDURE — 6370000000 HC RX 637 (ALT 250 FOR IP): Performed by: PHYSICAL MEDICINE & REHABILITATION

## 2019-05-16 PROCEDURE — 97530 THERAPEUTIC ACTIVITIES: CPT

## 2019-05-16 RX ORDER — FUROSEMIDE 20 MG/1
20 TABLET ORAL DAILY
Qty: 7 TABLET | Refills: 0 | Status: ON HOLD | OUTPATIENT
Start: 2019-05-17 | End: 2021-12-11 | Stop reason: HOSPADM

## 2019-05-16 RX ORDER — METHOCARBAMOL 500 MG/1
500 TABLET, FILM COATED ORAL 4 TIMES DAILY PRN
Qty: 60 TABLET | Refills: 0 | Status: SHIPPED | OUTPATIENT
Start: 2019-05-16 | End: 2019-05-26

## 2019-05-16 RX ADMIN — DOCUSATE SODIUM 100 MG: 100 CAPSULE, LIQUID FILLED ORAL at 08:30

## 2019-05-16 RX ADMIN — CARBAMAZEPINE 200 MG: 200 TABLET ORAL at 06:51

## 2019-05-16 RX ADMIN — ENOXAPARIN SODIUM 40 MG: 40 INJECTION SUBCUTANEOUS at 08:30

## 2019-05-16 RX ADMIN — MUPIROCIN: 20 OINTMENT TOPICAL at 08:30

## 2019-05-16 RX ADMIN — METOPROLOL SUCCINATE 12.5 MG: 25 TABLET, FILM COATED, EXTENDED RELEASE ORAL at 06:51

## 2019-05-16 RX ADMIN — FUROSEMIDE 20 MG: 20 TABLET ORAL at 08:30

## 2019-05-16 RX ADMIN — FINASTERIDE 5 MG: 5 TABLET, FILM COATED ORAL at 06:51

## 2019-05-16 ASSESSMENT — PAIN SCALES - GENERAL: PAINLEVEL_OUTOF10: 0

## 2019-05-16 NOTE — DISCHARGE INSTR - ACTIVITY
Activity as tolerated and recommended by PT/OT. Dressing changes daily to Left Lateral Chest Tube Site apply ointment to facilitate healing.

## 2019-05-16 NOTE — PROGRESS NOTES
Physical Therapy    Facility/Department: [address] [facility]  Daily Treatment Note    NAME: Matty Navarro  : 1922  MRN: [id number]    Date of Service: 2019  Evaluating Therapist: Zachary Ayala DPT    ROOM: [facility]  DIAGNOSIS: Multiple Fractures  PMH: S/p fall on 19 on left side. Xrays of left shoulder demonstrating a displaced scapular body fracture that extends into the glenoid. There is also previous fracture of the proximal humerus at the surgical neck with valgus impaction (left scapular body fracture, left glenoid fracture). Also found to have a left sided pneumothorax with left sided 7th rib fxand had a chest tube placed (removed 19). PMH includes HTN, R hip ORIF     PRECAUTIONS: NWBing L UE, sling when OOB, falls,     Social:  Pt lives alone in a 1 story condo with one step to enter and no HR. Prior to admission pt walked with a Hurrycane. Reported independent with ADLs/IADLs. Actively driving. Son checks in on him readily. Pt. Has a /cook 3days/wk       Initial Evaluation  19 AM   PM Short Term Goals Long Term Goals    Was pt agreeable to Eval/treatment? yes yes D/c     Does pt have pain?  No pain No c/o pain       Bed Mobility  Rolling: sba  Supine to sit: sba  Sit to supine: sba  Scooting: sba Rolling: mod I  Supine to sit: mod I  Sit to supine: mod I  Scooting: mod I  sup Mod I   Transfers Sit to stand: cg/sba  Stand to sit: cg/sba  Stand pivot: cgA with spc Sit to stand: mod I  Stand to sit: Sup  Stand pivot: SBA with SPC  Sup with AAD Mod I with AAD   Ambulation    150 feet with cgA/sba with spc 200' x2 reps with spc with SBA  250 feet with AAD with sup >500 feet with AAD with mod I   Walking 10 feet on uneven surface 10 feet with spc with cg/sba 10 feet with spc with SBA      Wheel Chair Mobility n/a       Car Transfers CgA/sba with spc Sup with spc  sup Mod I   Stair negotiation: ascended and descended  8 steps with right HR with cg/sba 12 steps with R HR sup  12 steps with one rail with sup >12 steps with one rail with mod I   Curb Step:   ascended and descended 4 inch step with spc and min/modA (LOB to right when descending) 4 in curb step with spc SBA  4 inch step with AAD and sba 4 inch step with AAD and mod I   Picking up object off the floor Picked up 2# with cgA assist Picked up 2# sba      BLE ROM [facility]      BLE Strength 4/5 bilateral LE 4/5 Bilateral LE      Balance  Sitting: Independent   Standing: cgA/sba with spc Sitting: Independent   Standing: sba with spc       Date Family Teach Completed TBA FT with son 5/14/19          Is additional Family Teaching Needed? Y or N Y       Hindering Progress Balance, NWB left UE       PT recommended ELOS 7-10 days       Team's Discharge Plan        Therapist at Team Meeting          Therapeutic Exercise:   AM: 5x STS transfer with no verbal cues for hand placement     PM:    Patient education  Pt educated on safety with mobility, sequencing with spc during all aspects of mobility, compensating for R trendelenberg during gait to prevent LOB     Patient response to education:   Pt verbalized understanding Pt demonstrated skill Pt requires further education in this area   yes Yes with verbal cues yes     Additional Comments: Reviewed all aspects of mobility to prepare for d/c. Continues to require occasional cues for safety with all aspects of mobility, including hand placement when sitting/wider placement of spc and weight shifting to right side to prevent LOB/trendelenberg pattern. Occasional stepping outside MADDI noted with mobility. Pt continues to decline assistance or supervision at d/c and planning to d/c home this date despite recommendation. AM  Time in: 1000  Time out: 1045    PM  Time in: d/c  Time out: d/c    Pt is making fair progress toward established Physical Therapy goals. Continue with physical therapy current plan of care.     Vamshi Hood, MADHUT  [id number]

## 2019-05-16 NOTE — PROGRESS NOTES
Physical Therapy    [facility]  Discharge Summary     NAME: Roseanna Mcpherson  : 1922  MRN: [id number]    Date of Service: 2019  Evaluating Therapist: Lillian Baxter DPT    ROOM: [address] [place] [address]  DIAGNOSIS: Multiple Fractures  PMH: S/p fall on 19 on left side. Xrays of left shoulder demonstrating a displaced scapular body fracture that extends into the glenoid. There is also previous fracture of the proximal humerus at the surgical neck with valgus impaction (left scapular body fracture, left glenoid fracture). Also found to have a left sided pneumothorax with left sided 7th rib fxand had a chest tube placed (removed 19). PMH includes HTN, R hip ORIF     PRECAUTIONS: NWBing L UE, sling when OOB, falls,     Social:  Pt lives alone in a 1 story condo with one step to enter and no HR. Prior to admission pt walked with a Hurrycane. Reported independent with ADLs/IADLs. Actively driving. Son checks in on him readily.  Pt. Has a /cook 3days/wk       Initial Evaluation  19 Discharge  19   Short Term Goals Long Term Goals    Bed Mobility  Rolling: sba  Supine to sit: sba  Sit to supine: sba  Scooting: sba Rolling: mod I  Supine to sit: mod I  Sit to supine: mod I  Scooting: mod I sup Mod I   Transfers Sit to stand: cg/sba  Stand to sit: cg/sba  Stand pivot: cgA with spc Sit to stand: mod I  Stand to sit: sup  Stand pivot: sbA with SPC Sup with AAD Mod I with AAD   Ambulation    150 feet with cgA/sba with spc 200-250' with spc sba 250 feet with AAD with sup >500 feet with AAD with mod I   Walking 10 feet on uneven surface 10 feet with spc with cg/sba 10' with spc sba     Wheel Chair Mobility n/a n/a     Car Transfers CgA/sba with spc sup with spc sup Mod I   Stair negotiation: ascended and descended  8 steps with right HR with cg/sba 12 steps with R HR sup 12 steps with one rail with sup >12 steps with one rail with mod I   Curb Step:   ascended and descended 4 inch step with spc and min/modA (LOB to right when descending) 4 in curb step with spc sba 4 inch step with AAD and sba 4 inch step with AAD and mod I   Picking up object off the floor Picked up 2# with cgA assist Picked up 2# sba     BLE ROM [facility]     BLE Strength 4/5 bilateral LE 4/5 bilateral LE     Balance  Sitting: Independent   Standing: cgA/sba with spc Sitting: Independent   Standing: sba with spc     Date Family Teach Completed TBA FT with son 5/14/19         Is additional Family Teaching Needed? Y or N Y      Hindering Progress Balance, NWB left UE      PT recommended ELOS 7-10 days      Team's Discharge Plan       Therapist at Team Meeting         Pt made steady, however limited progress during POC while on acute rehab unit. Pt limited by safety deficits with mobility, as well as balance deficit with noted R trendelenberg gait pattern leading to LOB to left side. Pt and son educated on recommendation for 24 hour supervision due to balance/safety deficits however pt planning to return home alone with assist from son PRN. Rec spc as safest device at this time with assistance during mobility. Rec HHPT for continued rehabilitation and safe transition home. Son present for FT and educated on all safety deficits and proper guarding techniques with mobility.      Gordon Polk DPT  [id number]

## 2019-05-16 NOTE — PROGRESS NOTES
ADL, Functional Mobility Training, Equipment Evaluation, Education, & procurement, Home Management Training, Endurance Training       [x] Continue with current OT Plan of care. [x] Prepare for Discharge     DISCHARGE RECOMMENDATIONS  Recommended DME:  Sc, tub seat    Post Discharge Care:   []Home Independently  [x]Home with 24hr Care / Supervision []Home with Partial Supervision [x]Home with Home Health OT []Home with Out Pt OT []Other: ___   Comments:    OCCUPATIONAL THERAPY DISCHARGE SUMMARY    Discontinue Occupational Therapy intervention. Pt has:   [] met all set goals. [x] made good progress toward set goals. [] has made slow progress toward goals and would benefit from rehab setting change.  [] had a medical decline and therefore was transferred off the Rehab unit.     Long term goals  Time Frame for Long term goals : 2 weeks to address abov eproblem areas  Long term goal 1: Pt demo independent to eat all meals  Long term goal 2: Pt demo Mod I grooming seated or standing  Long term goal 3: Pt demo Mod I UE/LE dress with AE as needed  Long term goal 4: Pt demo Sup to bathe @ shower level both seated & standing  Long term goal 5: Pt demo Mod I commode trf with appropriate DME to ensure pt safety  Long term goals 6: Pt demo Sup tub trf with appropriate DME to ensure pt safety  Long term goal 7: Pt demo Sup light hoemmaking activity & demo G- safety & insight  Long term goal 8: Pt demo G- endurance for a 20 minute functional activity    The Patient has received education on:  [x]Transfer Safety [x]Compensatory tech for ADLs []AE training [x]DME training []Energy Conservation [x]Home / Kitchen Safety  [x]Fall Prevention  []Other:    Family training was completed: yes    Recommendations: Continued OT services to address above deficits & ensure pt safety  Time in 10:45  Time Out 11:05  Total Time: 20 Mins  Rosendo Maldonado OTR/L [address] [place]/L [address]    I have read & agree with the above

## 2019-05-16 NOTE — PROGRESS NOTES
Provided discharge instructions to patient and son at this time. Answered all questions. Provided copies of instructions to patient at this time.

## 2019-05-17 NOTE — DISCHARGE SUMMARY
[address]                  [address] [facility],  [address]                               DISCHARGE SUMMARY    PATIENT NAME: Benoit Burk                      :        1922  MED REC NO:   [id number]                            ROOM:       5516  ACCOUNT NO:   [de-identified]                           ADMIT DATE: 2019  PROVIDER:     Uriel Cunningham MD                  DISCHARGE DATE:  2019    INTRODUCTION:  The patient was admitted to [facility] on 2019  following a fall. He had multiple rib fractures with a hemopneumothorax  and a fractured left scapula. He required a chest tube. He was still  very unsteady and was transferred to acute rehab on 2019. HOSPITAL COURSE:  On admission, the patient was felt to be a good rehab  candidate. He showed good progress throughout his stay. He improved to  the point that he was at a standby to supervisory level with all of his  functioning with a cane and was ready for discharge home on 2019. LABORATORY DATA:  CBC and metabolic profile were unremarkable. MEDICATIONS:  Tegretol, Colace, Proscar, Lasix, Toprol, and Flomax. DIAGNOSES:  1. Fall with multiple fractures. 2.  Multiple rib fractures. 3.  Hemopneumothorax. 4.  Fractured scapula. 5.  Previous fractured femur. 6.  Previous fractured humeral neck. 7.  Seizure disorder. 8.  Hypertension.         Meghan Graham MD    D: 2019 9:39:05       T: 2019 9:49:26     [email or web address]  Job#: [id number]     Doc#: [id number]    CC:

## 2020-02-26 ENCOUNTER — OFFICE VISIT (OUTPATIENT)
Dept: PODIATRY | Age: 85
End: 2020-02-26
Payer: MEDICARE

## 2020-02-26 PROBLEM — B35.3 TINEA PEDIS OF BOTH FEET: Status: ACTIVE | Noted: 2020-02-26

## 2020-02-26 PROBLEM — I73.9 PVD (PERIPHERAL VASCULAR DISEASE) (HCC): Status: ACTIVE | Noted: 2020-02-26

## 2020-02-26 PROBLEM — B35.1 ONYCHOMYCOSIS: Status: ACTIVE | Noted: 2020-02-26

## 2020-02-26 PROCEDURE — G8484 FLU IMMUNIZE NO ADMIN: HCPCS | Performed by: PODIATRIST

## 2020-02-26 PROCEDURE — 4040F PNEUMOC VAC/ADMIN/RCVD: CPT | Performed by: PODIATRIST

## 2020-02-26 PROCEDURE — 11721 DEBRIDE NAIL 6 OR MORE: CPT | Performed by: PODIATRIST

## 2020-02-26 PROCEDURE — 99203 OFFICE O/P NEW LOW 30 MIN: CPT | Performed by: PODIATRIST

## 2020-02-26 PROCEDURE — 1123F ACP DISCUSS/DSCN MKR DOCD: CPT | Performed by: PODIATRIST

## 2020-02-26 PROCEDURE — 1036F TOBACCO NON-USER: CPT | Performed by: PODIATRIST

## 2020-02-26 PROCEDURE — G8427 DOCREV CUR MEDS BY ELIG CLIN: HCPCS | Performed by: PODIATRIST

## 2020-02-26 PROCEDURE — G8420 CALC BMI NORM PARAMETERS: HCPCS | Performed by: PODIATRIST

## 2020-02-26 RX ORDER — CLOTRIMAZOLE AND BETAMETHASONE DIPROPIONATE 10; .64 MG/G; MG/G
CREAM TOPICAL
Qty: 45 G | Refills: 3 | Status: SHIPPED | OUTPATIENT
Start: 2020-02-26

## 2020-02-27 NOTE — PROGRESS NOTES
Patient in today for evaluation of burning sensation to the toes of the right foot. The problem has been present for approximately 1-2 years. He states the problem only occurs at night.
patient tolerance. Prescription medication Lotrizone given with exact instructions on usage. I discussed the potential side effects that the patient may experience with the medication and the need to call if they experience any. It was discussed in detail with the patient proper caring for the vascular compromised foot. The fact that they have compromised blood flow put the patient at risk for infection/gangrene/amputation. The patient should not walk barefoot. Shoe gear should fit properly and socks should be worn with shoes. Exercise is very important to prevent worsening of the disease process but before performing an exercise program should check with their family physician first.  If any skin lesions are noted, they are instructed to contact the office immediately. Patient will be followed up at a later date for continued evaluation and care. Seen By:    Lance Cuadra DPM    Electronically signed by Lance Cuadra DPM on 2/26/2020 at 9:38 PM      This note was created using voice recognition software. The note was reviewed however may contain grammatical errors.

## 2020-03-18 ENCOUNTER — OFFICE VISIT (OUTPATIENT)
Dept: PODIATRY | Age: 85
End: 2020-03-18
Payer: MEDICARE

## 2020-03-18 ENCOUNTER — HOSPITAL ENCOUNTER (OUTPATIENT)
Age: 85
Discharge: HOME | End: 2020-03-20
Payer: MEDICARE

## 2020-03-18 VITALS
WEIGHT: 160 LBS | TEMPERATURE: 97.6 F | OXYGEN SATURATION: 93 % | HEIGHT: 72 IN | HEART RATE: 80 BPM | BODY MASS INDEX: 21.67 KG/M2

## 2020-03-18 PROBLEM — L30.9 DERMATITIS: Status: ACTIVE | Noted: 2020-03-18

## 2020-03-18 PROCEDURE — G8420 CALC BMI NORM PARAMETERS: HCPCS | Performed by: PODIATRIST

## 2020-03-18 PROCEDURE — 88305 TISSUE EXAM BY PATHOLOGIST: CPT

## 2020-03-18 PROCEDURE — 99213 OFFICE O/P EST LOW 20 MIN: CPT | Performed by: PODIATRIST

## 2020-03-18 PROCEDURE — 1123F ACP DISCUSS/DSCN MKR DOCD: CPT | Performed by: PODIATRIST

## 2020-03-18 PROCEDURE — 88312 SPECIAL STAINS GROUP 1: CPT

## 2020-03-18 PROCEDURE — G8484 FLU IMMUNIZE NO ADMIN: HCPCS | Performed by: PODIATRIST

## 2020-03-18 PROCEDURE — 1036F TOBACCO NON-USER: CPT | Performed by: PODIATRIST

## 2020-03-18 PROCEDURE — 4040F PNEUMOC VAC/ADMIN/RCVD: CPT | Performed by: PODIATRIST

## 2020-03-18 PROCEDURE — G8427 DOCREV CUR MEDS BY ELIG CLIN: HCPCS | Performed by: PODIATRIST

## 2020-03-18 NOTE — PROGRESS NOTES
3/18/20     Sarah Cline    : 1922   Sex: male    Age: 80 y.o. Patient's PCP/Provider is:  Grace Genao MD    Subjective:  Patient is seen today for follow-up regarding continued treatment chronic tinea pedis/dermatitis issues right lower extremity. Patient has been utilizing the topical cream as instructed with minimal improvement noted. He wanted to discuss other potential treatment options available. No new abnormalities noted at this time. Chief Complaint   Patient presents with    Foot Pain     right foot       ROS:  Const: Positives and pertinent negatives as per HPI. Musculo: Denies symptoms other than stated above. Neuro: Denies symptoms other than stated above. Skin: Denies symptoms other than stated above. Current Medications:    Current Outpatient Medications:     clotrimazole-betamethasone (LOTRISONE) 1-0.05 % cream, Apply topically 2 times daily. , Disp: 45 g, Rfl: 3    furosemide (LASIX) 20 MG tablet, Take 1 tablet by mouth daily, Disp: 7 tablet, Rfl: 0    finasteride (PROSCAR) 5 MG tablet, Take 5 mg by mouth every morning (before breakfast), Disp: , Rfl:     carBAMazepine (TEGRETOL) 200 MG tablet, Take 200 mg by mouth every morning (before breakfast), Disp: , Rfl:     metoprolol succinate (TOPROL XL) 25 MG extended release tablet, Take 12.5 mg by mouth every morning (before breakfast), Disp: , Rfl:     Multiple Vitamins-Minerals (THERAPEUTIC MULTIVITAMIN-MINERALS) tablet, Take 1 tablet by mouth every morning (before breakfast), Disp: , Rfl:     aspirin 81 MG tablet, Take 81 mg by mouth every morning (before breakfast), Disp: , Rfl:     tamsulosin (FLOMAX) 0.4 MG capsule, Take 0.4 mg by mouth Daily with supper, Disp: , Rfl:     Allergies:  No Known Allergies    Vitals:    20 1026   Pulse: 80   Temp: 97.6 °F (36.4 °C)   SpO2: 93%   Weight: 160 lb (72.6 kg)   Height: 6' (1.829 m)       Exam:  Neurovascular status unchanged.   Dry, scaly skin noted plantar aspect

## 2020-03-25 RX ORDER — CLOTRIMAZOLE 1 %
CREAM (GRAM) TOPICAL
Qty: 60 G | Refills: 2 | Status: SHIPPED | OUTPATIENT
Start: 2020-03-25 | End: 2020-04-01

## 2020-03-26 ENCOUNTER — TELEPHONE (OUTPATIENT)
Dept: PODIATRY | Age: 85
End: 2020-03-26

## 2020-06-01 ENCOUNTER — OFFICE VISIT (OUTPATIENT)
Dept: PODIATRY | Age: 85
End: 2020-06-01
Payer: MEDICARE

## 2020-06-01 VITALS — TEMPERATURE: 97 F | OXYGEN SATURATION: 96 % | HEART RATE: 60 BPM

## 2020-06-01 PROCEDURE — G8427 DOCREV CUR MEDS BY ELIG CLIN: HCPCS | Performed by: PODIATRIST

## 2020-06-01 PROCEDURE — 1123F ACP DISCUSS/DSCN MKR DOCD: CPT | Performed by: PODIATRIST

## 2020-06-01 PROCEDURE — G8420 CALC BMI NORM PARAMETERS: HCPCS | Performed by: PODIATRIST

## 2020-06-01 PROCEDURE — 4040F PNEUMOC VAC/ADMIN/RCVD: CPT | Performed by: PODIATRIST

## 2020-06-01 PROCEDURE — 1036F TOBACCO NON-USER: CPT | Performed by: PODIATRIST

## 2020-06-01 PROCEDURE — 99213 OFFICE O/P EST LOW 20 MIN: CPT | Performed by: PODIATRIST

## 2020-06-01 RX ORDER — NAFTIFINE HYDROCHLORIDE 20 MG/G
1 CREAM TOPICAL DAILY
Qty: 60 G | Refills: 2 | Status: SHIPPED | OUTPATIENT
Start: 2020-06-01

## 2020-06-02 NOTE — PROGRESS NOTES
20     Nicki Barry    : 1922   Sex: male    Age: 80 y.o. Patient's PCP/Provider is:  Faustino Phillips MD    Subjective:  Patient is seen today for follow-up regarding continued treatment chronic tinea pedis issues to the right lower extremity. Patient stated that the previous cream he was applying did not alleviate his current issues. He wanted to discuss other potential treatment options available at this time. No other additional issues at this time. Chief Complaint   Patient presents with    Tinea Pedis     both feet       ROS:  Const: Positives and pertinent negatives as per HPI. Musculo: Denies symptoms other than stated above. Neuro: Denies symptoms other than stated above. Skin: Denies symptoms other than stated above. Current Medications:    Current Outpatient Medications:     Naftifine HCl 2 % CREA, Apply 1 g topically daily, Disp: 60 g, Rfl: 2    clotrimazole-betamethasone (LOTRISONE) 1-0.05 % cream, Apply topically 2 times daily. , Disp: 45 g, Rfl: 3    furosemide (LASIX) 20 MG tablet, Take 1 tablet by mouth daily, Disp: 7 tablet, Rfl: 0    finasteride (PROSCAR) 5 MG tablet, Take 5 mg by mouth every morning (before breakfast), Disp: , Rfl:     carBAMazepine (TEGRETOL) 200 MG tablet, Take 200 mg by mouth every morning (before breakfast), Disp: , Rfl:     metoprolol succinate (TOPROL XL) 25 MG extended release tablet, Take 12.5 mg by mouth every morning (before breakfast), Disp: , Rfl:     Multiple Vitamins-Minerals (THERAPEUTIC MULTIVITAMIN-MINERALS) tablet, Take 1 tablet by mouth every morning (before breakfast), Disp: , Rfl:     aspirin 81 MG tablet, Take 81 mg by mouth every morning (before breakfast), Disp: , Rfl:     tamsulosin (FLOMAX) 0.4 MG capsule, Take 0.4 mg by mouth Daily with supper, Disp: , Rfl:     Allergies:  No Known Allergies    Vitals:    20 1108   Pulse: 60   Temp: 97 °F (36.1 °C)   SpO2: 96%       Exam:  Neurovascular status unchanged.   Dry,

## 2020-07-01 ENCOUNTER — OFFICE VISIT (OUTPATIENT)
Dept: PODIATRY | Age: 85
End: 2020-07-01
Payer: MEDICARE

## 2020-07-01 PROCEDURE — G8427 DOCREV CUR MEDS BY ELIG CLIN: HCPCS | Performed by: PODIATRIST

## 2020-07-01 PROCEDURE — G8420 CALC BMI NORM PARAMETERS: HCPCS | Performed by: PODIATRIST

## 2020-07-01 PROCEDURE — 1123F ACP DISCUSS/DSCN MKR DOCD: CPT | Performed by: PODIATRIST

## 2020-07-01 PROCEDURE — 4040F PNEUMOC VAC/ADMIN/RCVD: CPT | Performed by: PODIATRIST

## 2020-07-01 PROCEDURE — 99213 OFFICE O/P EST LOW 20 MIN: CPT | Performed by: PODIATRIST

## 2020-07-01 PROCEDURE — 1036F TOBACCO NON-USER: CPT | Performed by: PODIATRIST

## 2020-07-01 NOTE — PROGRESS NOTES
20     Brendan Henderson    : 1922   Sex: male    Age: 80 y.o. Patient's PCP/Provider is:  Olive Jordan MD    Subjective:  Patient is seen today for follow-up regarding continued care chronic tinea pedis issues bilateral foot. Patient has been applying the new topical antifungal with noted improvement. He denies any additional issues at this time. Chief Complaint   Patient presents with    Tinea Pedis       ROS:  Const: Positives and pertinent negatives as per HPI. Musculo: Denies symptoms other than stated above. Neuro: Denies symptoms other than stated above. Skin: Denies symptoms other than stated above. Current Medications:    Current Outpatient Medications:     Naftifine HCl 2 % CREA, Apply 1 g topically daily, Disp: 60 g, Rfl: 2    clotrimazole-betamethasone (LOTRISONE) 1-0.05 % cream, Apply topically 2 times daily. , Disp: 45 g, Rfl: 3    furosemide (LASIX) 20 MG tablet, Take 1 tablet by mouth daily, Disp: 7 tablet, Rfl: 0    finasteride (PROSCAR) 5 MG tablet, Take 5 mg by mouth every morning (before breakfast), Disp: , Rfl:     carBAMazepine (TEGRETOL) 200 MG tablet, Take 200 mg by mouth every morning (before breakfast), Disp: , Rfl:     metoprolol succinate (TOPROL XL) 25 MG extended release tablet, Take 12.5 mg by mouth every morning (before breakfast), Disp: , Rfl:     Multiple Vitamins-Minerals (THERAPEUTIC MULTIVITAMIN-MINERALS) tablet, Take 1 tablet by mouth every morning (before breakfast), Disp: , Rfl:     aspirin 81 MG tablet, Take 81 mg by mouth every morning (before breakfast), Disp: , Rfl:     tamsulosin (FLOMAX) 0.4 MG capsule, Take 0.4 mg by mouth Daily with supper, Disp: , Rfl:     Allergies:  No Known Allergies    There were no vitals filed for this visit. Exam:  Neurovascular status unchanged. Tinea pedis issues to both feet are improved plantar webspaces and ball of the foot regions right.   No skin fissuring or any signs of infection noted.    Diagnostic Studies:     No results found. Procedures:    None    Plan Per Assessment  Jessica Grover was seen today for tinea pedis. Diagnoses and all orders for this visit:    Tinea pedis of both feet    [facility]      1. Evaluation and management  2. We did recommend continued use of the topical antifungal cream daily as recommended, for an additional 2-week timeframe. It was discussed in detail with the patient proper caring for the vascular compromised foot. The fact that they have compromised blood flow put the patient at risk for infection/gangrene/amputation. The patient should not walk barefoot. Shoe gear should fit properly and socks should be worn with shoes. Exercise is very important to prevent worsening of the disease process but before performing an exercise program should check with their family physician first.  If any skin lesions are noted, they are instructed to contact the office immediately. Patient will be followed up in 1 month's time or sooner if needed for reevaluation. Seen By:    Gina Sanchez DPM    Electronically signed by Gina Sanchez DPM on 7/1/2020 at 10:49 AM    This note was created using voice recognition software. The note was reviewed however may contain grammatical errors.

## 2020-08-03 ENCOUNTER — HOSPITAL ENCOUNTER (EMERGENCY)
Age: 85
Discharge: HOME | End: 2020-08-03
Attending: EMERGENCY MEDICINE
Payer: MEDICARE

## 2020-08-03 ENCOUNTER — APPOINTMENT (OUTPATIENT)
Dept: CT IMAGING | Age: 85
End: 2020-08-03
Payer: MEDICARE

## 2020-08-03 ENCOUNTER — APPOINTMENT (OUTPATIENT)
Dept: GENERAL RADIOLOGY | Age: 85
End: 2020-08-03
Payer: MEDICARE

## 2020-08-03 VITALS
OXYGEN SATURATION: 97 % | HEIGHT: 72 IN | RESPIRATION RATE: 16 BRPM | TEMPERATURE: 97.7 F | SYSTOLIC BLOOD PRESSURE: 145 MMHG | WEIGHT: 160 LBS | DIASTOLIC BLOOD PRESSURE: 72 MMHG | BODY MASS INDEX: 21.67 KG/M2 | HEART RATE: 67 BPM

## 2020-08-03 LAB
ALBUMIN SERPL-MCNC: 4 G/DL (ref 3.5–5.2)
ALP BLD-CCNC: 97 U/L (ref 40–129)
ALT SERPL-CCNC: 21 U/L (ref 0–40)
ANION GAP SERPL CALCULATED.3IONS-SCNC: 7 MMOL/L (ref 7–16)
AST SERPL-CCNC: 20 U/L (ref 0–39)
BASOPHILS ABSOLUTE: 0.02 E9/L (ref 0–0.2)
BASOPHILS RELATIVE PERCENT: 0.3 % (ref 0–2)
BILIRUB SERPL-MCNC: 0.5 MG/DL (ref 0–1.2)
BILIRUBIN DIRECT: <0.2 MG/DL (ref 0–0.3)
BILIRUBIN URINE: NEGATIVE
BILIRUBIN, INDIRECT: NORMAL MG/DL (ref 0–1)
BLOOD, URINE: NEGATIVE
BUN BLDV-MCNC: 21 MG/DL (ref 8–23)
CALCIUM SERPL-MCNC: 9.4 MG/DL (ref 8.6–10.2)
CHLORIDE BLD-SCNC: 105 MMOL/L (ref 98–107)
CLARITY: CLEAR
CO2: 27 MMOL/L (ref 22–29)
COLOR: YELLOW
CREAT SERPL-MCNC: 0.9 MG/DL (ref 0.7–1.2)
EOSINOPHILS ABSOLUTE: 0.04 E9/L (ref 0.05–0.5)
EOSINOPHILS RELATIVE PERCENT: 0.6 % (ref 0–6)
GFR AFRICAN AMERICAN: >60
GFR NON-AFRICAN AMERICAN: >60 ML/MIN/1.73
GLUCOSE BLD-MCNC: 93 MG/DL (ref 74–99)
GLUCOSE URINE: NEGATIVE MG/DL
HCT VFR BLD CALC: 42.7 % (ref 37–54)
HEMOGLOBIN: 14.4 G/DL (ref 12.5–16.5)
IMMATURE GRANULOCYTES #: 0.03 E9/L
IMMATURE GRANULOCYTES %: 0.5 % (ref 0–5)
KETONES, URINE: ABNORMAL MG/DL
LACTIC ACID: 1 MMOL/L (ref 0.5–2.2)
LEUKOCYTE ESTERASE, URINE: NEGATIVE
LIPASE: 28 U/L (ref 13–60)
LYMPHOCYTES ABSOLUTE: 1.5 E9/L (ref 1.5–4)
LYMPHOCYTES RELATIVE PERCENT: 23.3 % (ref 20–42)
MCH RBC QN AUTO: 32.3 PG (ref 26–35)
MCHC RBC AUTO-ENTMCNC: 33.7 % (ref 32–34.5)
MCV RBC AUTO: 95.7 FL (ref 80–99.9)
MONOCYTES ABSOLUTE: 0.51 E9/L (ref 0.1–0.95)
MONOCYTES RELATIVE PERCENT: 7.9 % (ref 2–12)
NEUTROPHILS ABSOLUTE: 4.33 E9/L (ref 1.8–7.3)
NEUTROPHILS RELATIVE PERCENT: 67.4 % (ref 43–80)
NITRITE, URINE: NEGATIVE
PDW BLD-RTO: 13.4 FL (ref 11.5–15)
PH UA: 6 (ref 5–9)
PLATELET # BLD: 190 E9/L (ref 130–450)
PMV BLD AUTO: 8.8 FL (ref 7–12)
POTASSIUM REFLEX MAGNESIUM: 4.6 MMOL/L (ref 3.5–5)
PROTEIN UA: NEGATIVE MG/DL
RBC # BLD: 4.46 E12/L (ref 3.8–5.8)
SODIUM BLD-SCNC: 139 MMOL/L (ref 132–146)
SPECIFIC GRAVITY UA: 1.01 (ref 1–1.03)
TOTAL PROTEIN: 6.4 G/DL (ref 6.4–8.3)
TROPONIN: <0.01 NG/ML (ref 0–0.03)
UROBILINOGEN, URINE: 0.2 E.U./DL
WBC # BLD: 6.4 E9/L (ref 4.5–11.5)

## 2020-08-03 PROCEDURE — 96374 THER/PROPH/DIAG INJ IV PUSH: CPT

## 2020-08-03 PROCEDURE — 93005 ELECTROCARDIOGRAM TRACING: CPT | Performed by: STUDENT IN AN ORGANIZED HEALTH CARE EDUCATION/TRAINING PROGRAM

## 2020-08-03 PROCEDURE — 83690 ASSAY OF LIPASE: CPT

## 2020-08-03 PROCEDURE — 6360000004 HC RX CONTRAST MEDICATION: Performed by: RADIOLOGY

## 2020-08-03 PROCEDURE — 81003 URINALYSIS AUTO W/O SCOPE: CPT

## 2020-08-03 PROCEDURE — 74177 CT ABD & PELVIS W/CONTRAST: CPT

## 2020-08-03 PROCEDURE — 6360000002 HC RX W HCPCS: Performed by: STUDENT IN AN ORGANIZED HEALTH CARE EDUCATION/TRAINING PROGRAM

## 2020-08-03 PROCEDURE — 80076 HEPATIC FUNCTION PANEL: CPT

## 2020-08-03 PROCEDURE — 71045 X-RAY EXAM CHEST 1 VIEW: CPT

## 2020-08-03 PROCEDURE — 99284 EMERGENCY DEPT VISIT MOD MDM: CPT

## 2020-08-03 PROCEDURE — 85025 COMPLETE CBC W/AUTO DIFF WBC: CPT

## 2020-08-03 PROCEDURE — 83605 ASSAY OF LACTIC ACID: CPT

## 2020-08-03 PROCEDURE — 2580000003 HC RX 258: Performed by: RADIOLOGY

## 2020-08-03 PROCEDURE — 80048 BASIC METABOLIC PNL TOTAL CA: CPT

## 2020-08-03 PROCEDURE — 84484 ASSAY OF TROPONIN QUANT: CPT

## 2020-08-03 RX ORDER — SODIUM CHLORIDE 0.9 % (FLUSH) 0.9 %
10 SYRINGE (ML) INJECTION PRN
Status: COMPLETED | OUTPATIENT
Start: 2020-08-03 | End: 2020-08-03

## 2020-08-03 RX ORDER — HYDRALAZINE HYDROCHLORIDE 20 MG/ML
10 INJECTION INTRAMUSCULAR; INTRAVENOUS ONCE
Status: COMPLETED | OUTPATIENT
Start: 2020-08-03 | End: 2020-08-03

## 2020-08-03 RX ADMIN — IOPAMIDOL 110 ML: 755 INJECTION, SOLUTION INTRAVENOUS at 14:01

## 2020-08-03 RX ADMIN — HYDRALAZINE HYDROCHLORIDE 10 MG: 20 INJECTION INTRAMUSCULAR; INTRAVENOUS at 16:11

## 2020-08-03 RX ADMIN — Medication 10 ML: at 13:59

## 2020-08-03 ASSESSMENT — ENCOUNTER SYMPTOMS
SORE THROAT: 0
COUGH: 0
EYE DISCHARGE: 0
SINUS PRESSURE: 0
EYE REDNESS: 0
VOMITING: 0
DIARRHEA: 0
BACK PAIN: 0
ABDOMINAL PAIN: 1
EYE PAIN: 0
SHORTNESS OF BREATH: 0
CONSTIPATION: 1
NAUSEA: 0
WHEEZING: 0

## 2020-08-03 NOTE — ED PROVIDER NOTES
HPI   79-year-old male patient with past history of hernia repair presents to emergency department with approximately 4 days of abdominal pain. He says for the last 3 days he has not had a bowel movement. The pain is worse with movement and the pain was especially bad on the drive to the emergency department. It also worsens when he goes from a seated to a laying down position in the bed. Denies nausea, vomiting, diarrhea, no fever chills, no dysuria or hematuria. No hematochezia. Patient says that the abdominal pain is in the right side of his abdomen. He saw his physician on Friday who gave him 600 mg ibuprofen. Patient took ibuprofen just prior to arrival at 11 AM.  When he is not moving he has no pain. .  Review of Systems   Constitutional: Negative for chills and fever. HENT: Negative for ear pain, sinus pressure and sore throat. Eyes: Negative for pain, discharge and redness. Respiratory: Negative for cough, shortness of breath and wheezing. Cardiovascular: Negative for chest pain. Gastrointestinal: Positive for abdominal pain (With movement, right side) and constipation (Patient has not had a bowel movement 3 days). Negative for diarrhea, nausea and vomiting. Genitourinary: Negative for dysuria and frequency. Musculoskeletal: Negative for arthralgias and back pain. Skin: Negative for rash and wound. Neurological: Negative for weakness and headaches. Hematological: Negative for adenopathy. All other systems reviewed and are negative. Physical Exam  Vitals signs and nursing note reviewed. Constitutional:       General: He is not in acute distress. Appearance: Normal appearance. HENT:      Head: Normocephalic. Mouth/Throat:      Mouth: Mucous membranes are moist.      Pharynx: Oropharynx is clear. Eyes:      Conjunctiva/sclera: Conjunctivae normal.   Cardiovascular:      Rate and Rhythm: Normal rate and regular rhythm. Pulses: Normal pulses. not have any abdominal pain whatsoever. He still has elevated blood pressure at 223/95 will order 10 mg of hydralazine. [FG]      ED Course User Index  [FG] Luis Alonzos, DO       --------------------------------------------- PAST HISTORY ---------------------------------------------  Past Medical History:  has a past medical history of Hypertension. Past Surgical History:  has a past surgical history that includes hernia repair and hip surgery (Right, 12/07/2017). Social History:  reports that he quit smoking about 75 years ago. He quit after 2.00 years of use. He has never used smokeless tobacco. He reports current alcohol use. He reports that he does not use drugs. Family History: family history is not on file. The patients home medications have been reviewed. Allergies: Patient has no known allergies.     -------------------------------------------------- RESULTS -------------------------------------------------  Labs:  Results for orders placed or performed during the hospital encounter of 08/03/20   CBC Auto Differential   Result Value Ref Range    WBC 6.4 4.5 - 11.5 E9/L    RBC 4.46 3.80 - 5.80 E12/L    Hemoglobin 14.4 12.5 - 16.5 g/dL    Hematocrit 42.7 37.0 - 54.0 %    MCV 95.7 80.0 - 99.9 fL    MCH 32.3 26.0 - 35.0 pg    MCHC 33.7 32.0 - 34.5 %    RDW 13.4 11.5 - 15.0 fL    Platelets 801 517 - 485 E9/L    MPV 8.8 7.0 - 12.0 fL    Neutrophils % 67.4 43.0 - 80.0 %    Immature Granulocytes % 0.5 0.0 - 5.0 %    Lymphocytes % 23.3 20.0 - 42.0 %    Monocytes % 7.9 2.0 - 12.0 %    Eosinophils % 0.6 0.0 - 6.0 %    Basophils % 0.3 0.0 - 2.0 %    Neutrophils Absolute 4.33 1.80 - 7.30 E9/L    Immature Granulocytes # 0.03 E9/L    Lymphocytes Absolute 1.50 1.50 - 4.00 E9/L    Monocytes Absolute 0.51 0.10 - 0.95 E9/L    Eosinophils Absolute 0.04 (L) 0.05 - 0.50 E9/L    Basophils Absolute 0.02 0.00 - 0.20 L3/S   Basic Metabolic Panel w/ Reflex to MG   Result Value Ref Range    Sodium 139 132 - 146 CHEST PORTABLE   Final Result      1. New mild left basilar infiltrate with mild atelectasis. 2. Cardiomegaly. ------------------------- NURSING NOTES AND VITALS REVIEWED ---------------------------  Date / Time Roomed:  8/3/2020 11:44 AM  ED Bed Assignment:  13/13    The nursing notes within the ED encounter and vital signs as below have been reviewed. BP (!) 145/72   Pulse 67   Temp 97.7 °F (36.5 °C)   Resp 16   Ht 6' (1.829 m)   Wt 160 lb (72.6 kg)   SpO2 97%   BMI 21.70 kg/m²   Oxygen Saturation Interpretation: Normal      ------------------------------------------ PROGRESS NOTES ------------------------------------------  5:29 PM EDT  I have spoken with the patient and discussed todays results, in addition to providing specific details for the plan of care and counseling regarding the diagnosis and prognosis. Their questions are answered at this time and they are agreeable with the plan. I discussed at length with them reasons for immediate return here for re evaluation. They will followup with their primary care physician by calling their office tomorrow. --------------------------------- ADDITIONAL PROVIDER NOTES ---------------------------------  At this time the patient is without objective evidence of an acute process requiring hospitalization or inpatient management. They have remained hemodynamically stable throughout their entire ED visit and are stable for discharge with outpatient follow-up. The plan has been discussed in detail and they are aware of the specific conditions for emergent return, as well as the importance of follow-up. Discharge Medication List as of 8/3/2020  5:04 PM          Diagnosis:  1. Right lower quadrant abdominal pain Improving       Disposition:  Patient's disposition: Discharge to home  Patient's condition is stable. ED Course as of Aug 03 1715   Mon Aug 03, 2020   1544 Repeat abdominal exam was negative.   Patient is

## 2020-08-04 LAB
EKG ATRIAL RATE: 57 BPM
EKG P AXIS: 34 DEGREES
EKG P-R INTERVAL: 226 MS
EKG Q-T INTERVAL: 440 MS
EKG QRS DURATION: 94 MS
EKG QTC CALCULATION (BAZETT): 428 MS
EKG R AXIS: -33 DEGREES
EKG T AXIS: 46 DEGREES
EKG VENTRICULAR RATE: 57 BPM

## 2020-08-04 PROCEDURE — 93010 ELECTROCARDIOGRAM REPORT: CPT | Performed by: INTERNAL MEDICINE

## 2020-08-05 ENCOUNTER — OFFICE VISIT (OUTPATIENT)
Dept: PODIATRY | Age: 85
End: 2020-08-05
Payer: MEDICARE

## 2020-08-05 VITALS — TEMPERATURE: 98 F

## 2020-08-05 PROCEDURE — 1036F TOBACCO NON-USER: CPT | Performed by: PODIATRIST

## 2020-08-05 PROCEDURE — 1123F ACP DISCUSS/DSCN MKR DOCD: CPT | Performed by: PODIATRIST

## 2020-08-05 PROCEDURE — 4040F PNEUMOC VAC/ADMIN/RCVD: CPT | Performed by: PODIATRIST

## 2020-08-05 PROCEDURE — G8420 CALC BMI NORM PARAMETERS: HCPCS | Performed by: PODIATRIST

## 2020-08-05 PROCEDURE — 99213 OFFICE O/P EST LOW 20 MIN: CPT | Performed by: PODIATRIST

## 2020-08-05 PROCEDURE — G8427 DOCREV CUR MEDS BY ELIG CLIN: HCPCS | Performed by: PODIATRIST

## 2020-08-05 NOTE — PROGRESS NOTES
20     Hugo Osei    : 1922   Sex: male    Age: 80 y.o. Patient's PCP/Provider is:  Charla Morales MD    Subjective:  Patient is seen today for follow-up regarding continued care chronic tinea pedis issues bilateral foot. Overall patient is stable at this time and has been utilizing the Naftin cream as instructed. He denies any additional issues at this time. Chief Complaint   Patient presents with    Tinea Pedis       ROS:  Const: Positives and pertinent negatives as per HPI. Musculo: Denies symptoms other than stated above. Neuro: Denies symptoms other than stated above. Skin: Denies symptoms other than stated above. Current Medications:    Current Outpatient Medications:     Naftifine HCl 2 % CREA, Apply 1 g topically daily, Disp: 60 g, Rfl: 2    clotrimazole-betamethasone (LOTRISONE) 1-0.05 % cream, Apply topically 2 times daily. , Disp: 45 g, Rfl: 3    furosemide (LASIX) 20 MG tablet, Take 1 tablet by mouth daily, Disp: 7 tablet, Rfl: 0    finasteride (PROSCAR) 5 MG tablet, Take 5 mg by mouth every morning (before breakfast), Disp: , Rfl:     carBAMazepine (TEGRETOL) 200 MG tablet, Take 200 mg by mouth every morning (before breakfast), Disp: , Rfl:     metoprolol succinate (TOPROL XL) 25 MG extended release tablet, Take 12.5 mg by mouth every morning (before breakfast), Disp: , Rfl:     Multiple Vitamins-Minerals (THERAPEUTIC MULTIVITAMIN-MINERALS) tablet, Take 1 tablet by mouth every morning (before breakfast), Disp: , Rfl:     aspirin 81 MG tablet, Take 81 mg by mouth every morning (before breakfast), Disp: , Rfl:     tamsulosin (FLOMAX) 0.4 MG capsule, Take 0.4 mg by mouth Daily with supper, Disp: , Rfl:     Allergies:  No Known Allergies    Vitals:    20 1101   Temp: 98 °F (36.7 °C)       Exam:  Neurovascular status unchanged. Mild maceration noted with scaliness plantar MTPJ regions right foot third through fifth toes.   No deep fissuring or ulcerations noted right foot. No signs of bacterial infection noted right foot. Generalized edematous issues to both lower extremities with stasis skin changes present bilaterally. Diagnostic Studies:     Ct Abdomen Pelvis W Iv Contrast Additional Contrast? None    Result Date: 8/3/2020  Patient MRN:  [id number] : 1922 Age: 80 years Gender: Male Order Date:  8/3/2020 1:50 PM EXAM: CT ABDOMEN PELVIS W IV CONTRAST INDICATION:  Right sided abdominal pain Right sided abdominal pain  COMPARISON: CT abdomen and pelvis, 2017 Technique: Low-dose CT  acquisition technique included one of following options; 1 . Automated exposure control, 2. Adjustment of MA and or KV according to patient's size or 3. Use of iterative reconstruction. Multiple computerized tomography sections of the abdomen and pelvis with sagittal and coronal MPR reconstructions were obtained from the top of the diaphragm to the pelvis. FINDINGS: LOWER THORAX: The heart is enlarged. Pulmonary edema or pleural effusion seen. Minimal atelectasis or scarring seen at the lung bases. LIVER: Multiple tiny hypodensities are too small to characterize. The larger grossly stable as of 2017 and likely cyst or hemangioma. GALLBLADDER: Cholelithiasis. No pericholecystic edema. SPLEEN:Unremarkable. ADRENALS:Unremarkable. KIDNEYS: Mild scarring along the midpole of the left kidney. Otherwise, grossly PANCREAS:Unremarkable. BOWEL: Severe sigmoid diverticulosis without diverticulitis. No bowel wall thickening or obstruction. APPENDIX:Unremarkable. BLADDER:Unremarkable. REPRODUCTIVE ORGANS: The prostate gland is massively enlarged, proximally measuring 8.1 x 6.5 x 1.6 cm. VASCULATURE: Prominent calcified atherosclerosis seen in the abdominal aorta. No aneurysm. LYMPH NODES:Unremarkable. BONES: There is a chronic compression fracture deformity of the L2 vertebral body with approximately 45% loss of height. No acute fracture is seen.  Severe disc degenerative changes seen in the lumbar spine. Surgical screws are seen in the proximal right femur transfixing an old healed fractures. MISCELLANEOUS:No additional finding. 1. No acute abnormality seen in the abdomen or the pelvis. 2. Cholelithiasis. 3. Severe sigmoid diverticulosis without diverticulitis. 4. Normal appendix. 5. Massively enlarged prostate. Xr Chest Portable    Result Date: 8/3/2020  Patient MRN:  [id number] : 1922 Age: 80 years Gender: Male Order Date:  8/3/2020 12:48 PM EXAM: XR CHEST PORTABLE INDICATION:  Geriatric Abdominal pain. COMPARISON: Chest x-ray 2019. FINDINGS:  There are horizontal and vertical band of atelectasis in the left lower lobe. Mild peripheral interstitial thickening. Right lung is grossly clear. Mild emphysematous changes in the upper lobes. Mild cardiomegaly without evidence of CHF. Aorta is mildly calcified and not dilated. Trachea is not obviously narrowed. No hiatal hernia or free air. Monitor leads. Old fracture deformity of the left humeral head and neck. Old fracture deformity of the lateral left scapula. 1. New mild left basilar infiltrate with mild atelectasis. 2. Cardiomegaly. Procedures:    None    Plan Per Assessment  Brigida Montoya was seen today for tinea pedis. Diagnoses and all orders for this visit:    Tinea pedis of both feet    PVD (peripheral vascular disease) ([address]      1. Evaluation and management  2. Did recommend continued use of the topical cream as previously prescribed and recommended. 3. Patient was advised and educated on appropriate foot health care and techniques to allow for continued improvement. Shoe recommendations were discussed with the patient in detail today as well. 4. Patient will be followed up in 6 weeks time or sooner if needed for reevaluation. Seen By:    Liset Ordonez DPM    Electronically signed by Liset Ordonez DPM on 2020 at 11:15 AM    This note was created using voice recognition software.   The note was reviewed however may contain grammatical errors.

## 2020-08-05 NOTE — PROGRESS NOTES
Patient is here today for follow up evaluation of tinea pedis of both feet. He states he does not notice a difference since his last visit.

## 2021-03-06 ENCOUNTER — APPOINTMENT (OUTPATIENT)
Dept: GENERAL RADIOLOGY | Age: 86
End: 2021-03-06
Payer: MEDICARE

## 2021-03-06 ENCOUNTER — HOSPITAL ENCOUNTER (EMERGENCY)
Age: 86
Discharge: HOME | End: 2021-03-06
Payer: MEDICARE

## 2021-03-06 ENCOUNTER — APPOINTMENT (OUTPATIENT)
Dept: CT IMAGING | Age: 86
End: 2021-03-06
Payer: MEDICARE

## 2021-03-06 VITALS
WEIGHT: 166 LBS | SYSTOLIC BLOOD PRESSURE: 167 MMHG | BODY MASS INDEX: 22.48 KG/M2 | HEART RATE: 61 BPM | HEIGHT: 72 IN | OXYGEN SATURATION: 98 % | RESPIRATION RATE: 18 BRPM | DIASTOLIC BLOOD PRESSURE: 74 MMHG | TEMPERATURE: 97.3 F

## 2021-03-06 DIAGNOSIS — S09.90XA INJURY OF HEAD, INITIAL ENCOUNTER: Primary | ICD-10-CM
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
## 2021-03-06 DIAGNOSIS — Z23 NEED FOR TDAP VACCINATION: ICD-10-CM

## 2021-03-06 DIAGNOSIS — S01.81XA FACIAL LACERATION, INITIAL ENCOUNTER: ICD-10-CM

## 2021-03-06 DIAGNOSIS — S00.03XA CONTUSION OF SCALP, INITIAL ENCOUNTER: ICD-10-CM

## 2021-03-06 LAB
ANION GAP SERPL CALCULATED.3IONS-SCNC: 3 MMOL/L (ref 7–16)
APTT: 27.6 SEC (ref 24.5–35.1)
BASOPHILS ABSOLUTE: 0.01 E9/L (ref 0–0.2)
BASOPHILS RELATIVE PERCENT: 0.1 % (ref 0–2)
BUN BLDV-MCNC: 24 MG/DL (ref 8–23)
CALCIUM SERPL-MCNC: 9.2 MG/DL (ref 8.6–10.2)
CHLORIDE BLD-SCNC: 107 MMOL/L (ref 98–107)
CO2: 28 MMOL/L (ref 22–29)
CREAT SERPL-MCNC: 0.8 MG/DL (ref 0.7–1.2)
EOSINOPHILS ABSOLUTE: 0.08 E9/L (ref 0.05–0.5)
EOSINOPHILS RELATIVE PERCENT: 1 % (ref 0–6)
GFR AFRICAN AMERICAN: >60
GFR NON-AFRICAN AMERICAN: >60 ML/MIN/1.73
GLUCOSE BLD-MCNC: 81 MG/DL (ref 74–99)
HCT VFR BLD CALC: 44.4 % (ref 37–54)
HEMOGLOBIN: 14.5 G/DL (ref 12.5–16.5)
IMMATURE GRANULOCYTES #: 0.03 E9/L
IMMATURE GRANULOCYTES %: 0.4 % (ref 0–5)
INR BLD: 1.1
LACTIC ACID: 1.3 MMOL/L (ref 0.5–2.2)
LYMPHOCYTES ABSOLUTE: 1.56 E9/L (ref 1.5–4)
LYMPHOCYTES RELATIVE PERCENT: 19 % (ref 20–42)
MCH RBC QN AUTO: 32.1 PG (ref 26–35)
MCHC RBC AUTO-ENTMCNC: 32.7 % (ref 32–34.5)
MCV RBC AUTO: 98.2 FL (ref 80–99.9)
MONOCYTES ABSOLUTE: 0.66 E9/L (ref 0.1–0.95)
MONOCYTES RELATIVE PERCENT: 8.1 % (ref 2–12)
NEUTROPHILS ABSOLUTE: 5.85 E9/L (ref 1.8–7.3)
NEUTROPHILS RELATIVE PERCENT: 71.4 % (ref 43–80)
PDW BLD-RTO: 13.8 FL (ref 11.5–15)
PLATELET # BLD: 187 E9/L (ref 130–450)
PMV BLD AUTO: 9.3 FL (ref 7–12)
POTASSIUM REFLEX MAGNESIUM: 4.8 MMOL/L (ref 3.5–5)
PROTHROMBIN TIME: 11.9 SEC (ref 9.3–12.4)
RBC # BLD: 4.52 E12/L (ref 3.8–5.8)
SODIUM BLD-SCNC: 138 MMOL/L (ref 132–146)
TROPONIN: <0.01 NG/ML (ref 0–0.03)
WBC # BLD: 8.2 E9/L (ref 4.5–11.5)

## 2021-03-06 PROCEDURE — 80048 BASIC METABOLIC PNL TOTAL CA: CPT

## 2021-03-06 PROCEDURE — 90715 TDAP VACCINE 7 YRS/> IM: CPT | Performed by: NURSE PRACTITIONER

## 2021-03-06 PROCEDURE — 85730 THROMBOPLASTIN TIME PARTIAL: CPT

## 2021-03-06 PROCEDURE — 99283 EMERGENCY DEPT VISIT LOW MDM: CPT

## 2021-03-06 PROCEDURE — 70486 CT MAXILLOFACIAL W/O DYE: CPT

## 2021-03-06 PROCEDURE — 90471 IMMUNIZATION ADMIN: CPT | Performed by: NURSE PRACTITIONER

## 2021-03-06 PROCEDURE — 70450 CT HEAD/BRAIN W/O DYE: CPT

## 2021-03-06 PROCEDURE — 84484 ASSAY OF TROPONIN QUANT: CPT

## 2021-03-06 PROCEDURE — 83605 ASSAY OF LACTIC ACID: CPT

## 2021-03-06 PROCEDURE — 2500000003 HC RX 250 WO HCPCS: Performed by: NURSE PRACTITIONER

## 2021-03-06 PROCEDURE — 85025 COMPLETE CBC W/AUTO DIFF WBC: CPT

## 2021-03-06 PROCEDURE — 6370000000 HC RX 637 (ALT 250 FOR IP): Performed by: NURSE PRACTITIONER

## 2021-03-06 PROCEDURE — 6360000002 HC RX W HCPCS: Performed by: NURSE PRACTITIONER

## 2021-03-06 PROCEDURE — 72125 CT NECK SPINE W/O DYE: CPT

## 2021-03-06 PROCEDURE — 85610 PROTHROMBIN TIME: CPT

## 2021-03-06 PROCEDURE — 71046 X-RAY EXAM CHEST 2 VIEWS: CPT

## 2021-03-06 PROCEDURE — 12011 RPR F/E/E/N/L/M 2.5 CM/<: CPT

## 2021-03-06 RX ORDER — LIDOCAINE HYDROCHLORIDE 10 MG/ML
5 INJECTION, SOLUTION INFILTRATION; PERINEURAL ONCE
Status: COMPLETED | OUTPATIENT
Start: 2021-03-06 | End: 2021-03-06

## 2021-03-06 RX ORDER — GINSENG 100 MG
CAPSULE ORAL ONCE
Status: COMPLETED | OUTPATIENT
Start: 2021-03-06 | End: 2021-03-06

## 2021-03-06 RX ADMIN — Medication: at 13:14

## 2021-03-06 RX ADMIN — LIDOCAINE HYDROCHLORIDE 5 ML: 10 INJECTION, SOLUTION INFILTRATION; PERINEURAL at 13:15

## 2021-03-06 RX ADMIN — TETANUS TOXOID, REDUCED DIPHTHERIA TOXOID AND ACELLULAR PERTUSSIS VACCINE, ADSORBED 0.5 ML: 5; 2.5; 8; 8; 2.5 SUSPENSION INTRAMUSCULAR at 13:18

## 2021-03-06 ASSESSMENT — PAIN SCALES - GENERAL: PAINLEVEL_OUTOF10: 0

## 2021-03-06 NOTE — ED PROVIDER NOTES
[facility]  Department of Emergency Medicine   ED  Encounter Note  Admit Date/RoomTime: 3/6/2021 11:03 AM  ED Room:     NAME: Loretta Valdovinos  : 1922  MRN: [id number]     Chief Complaint:  Fall (pt tripped and fell today hitting his forhead. did have LOC. no thinners. ) and Head Injury    History of Present Illness       Loretta Valdovinos is a 80 y.o. old male who presents to the emergency department by private vehicle, for a mechanical fall which occured 2 hour(s) prior to arrival. He reportedly tripped in his kitchen over his feet due to his shoes while at home prior to incident with complaints of left above eye laceration. The patients tetanus status is unknown. Since onset the symptoms have been remaining constant. His pain is aggraveated by pressure on or palpation of and relieved by nothing, as no treatment has been provided prior to this visit. He denies any headache, confusion, dizziness, neck pain, chest pain, abdominal pain, back pain, extremity injury, numbness, weakness, blurred vision, nausea or vomiting. He takes no blood thinning agents. .  ROS   Pertinent positives and negatives are stated within HPI, all other systems reviewed and are negative. Past Medical History:  has a past medical history of Hypertension. Surgical History:  has a past surgical history that includes hernia repair and hip surgery (Right, 2017). Social History:  reports that he quit smoking about 76 years ago. He quit after 2.00 years of use. He has never used smokeless tobacco. He reports current alcohol use. He reports that he does not use drugs. Family History: family history is not on file. Allergies: Patient has no known allergies.     Physical Exam   Oxygen Saturation Interpretation: Normal.        ED Triage Vitals   BP Temp Temp Source Pulse Resp SpO2 Height Weight   21 1104 21 1104 21 1104 21 1105 21 1104 21 E9/L    Monocytes Absolute 0.66 0.10 - 0.95 E9/L    Eosinophils Absolute 0.08 0.05 - 0.50 E9/L    Basophils Absolute 0.01 0.00 - 0.20 D9/T   Basic Metabolic Panel w/ Reflex to MG   Result Value Ref Range    Sodium 138 132 - 146 mmol/L    Potassium reflex Magnesium 4.8 3.5 - 5.0 mmol/L    Chloride 107 98 - 107 mmol/L    CO2 28 22 - 29 mmol/L    Anion Gap 3 (L) 7 - 16 mmol/L    Glucose 81 74 - 99 mg/dL    BUN 24 (H) 8 - 23 mg/dL    CREATININE 0.8 0.7 - 1.2 mg/dL    GFR Non-African American >60 >=60 mL/min/1.73    GFR African American >60     Calcium 9.2 8.6 - 10.2 mg/dL   Troponin   Result Value Ref Range    Troponin <0.01 0.00 - 0.03 ng/mL   Lactic Acid, Plasma   Result Value Ref Range    Lactic Acid 1.3 0.5 - 2.2 mmol/L   Protime-INR   Result Value Ref Range    Protime 11.9 9.3 - 12.4 sec    INR 1.1    APTT   Result Value Ref Range    aPTT 27.6 24.5 - 35.1 sec       Imaging: All Radiology results interpreted by Radiologist unless otherwise noted. CT Head WO Contrast   Final Result   No acute intracranial abnormality. Contusion of the scalp in the frontal region towards the left side above the   left orbital area. CT Cervical Spine WO Contrast   Final Result   Multilevel degenerative changes as described above      No acute traumatic abnormality identified         CT FACIAL BONES WO CONTRAST   Final Result   No acute traumatic injury of the facial bones. XR CHEST (2 VW)   Final Result   1. Small right pleural effusion. Mild bibasilar atelectasis, greater on the   right. 2. Mild cardiomegaly. 3. No pneumothorax. Probable COPD. 4. Mild compression deformity of T10, of indeterminate age but new compared   to May 2019. If clinically indicated, CT of the thoracic spine could be done   for further evaluation.            ED Course / Medical Decision Making     Medications   lidocaine 1 % injection 5 mL (5 mLs Intradermal Given by Other 3/6/21 0089)   bacitracin ointment ( Topical Given by He denies any blurred or double vision. CT of the cervical spine reveals multilevel degenerative changes. No traumatic abnormalities. Facial bones revealed no acute traumatic injury of the facial bones. Chest x-ray revealed small right pleural effusion and mild bilateral atelectasis. Patient has no cough, shortness breath or fevers or chills. Is mild cardiomegaly. No pneumothorax probable COPD. There was a mild compression deformity at T10. Patient had no midline thoracic tenderness or back pain. He states this is from an old injury. Patient refused EKG and states he did not need it. Wound care was completed Tdap was updated. Sutures were placed for laceration repair of the left eyebrow. Discussed appropriate wound care and signs symptoms of infection. Patient continues to be non-toxic on re-evaluation. Findings were discussed with the patient and reasons to immediately return to the ED were articulated to them. They will follow-up with their PMD.      Plan of Care/Counseling:  I reviewed today's visit with the patient and family member son in addition to providing specific details for the plan of care and counseling regarding the diagnosis and prognosis. Questions are answered at this time and are agreeable with the plan. Assessment      1. Injury of head, initial encounter    2. Facial laceration, initial encounter    3. Fall, initial encounter    4. Contusion of scalp, initial encounter    5. Need for Tdap vaccination      Plan   Discharge home. Patient condition is stable    New Medications     Discharge Medication List as of 3/6/2021  1:22 PM        Electronically signed by GHADA Best CNP   DD: 3/6/21  **This report was transcribed using voice recognition software. Every effort was made to ensure accuracy; however, inadvertent computerized transcription errors may be present.   END OF ED PROVIDER NOTE       GHADA Best CNP  03/06/21 7332

## 2021-03-06 NOTE — ED NOTES
Jericho Brown LPN attempted to do ekg- pt refused.  Jonah beltran notified     Keyonna Irizarry RN  03/06/21 4824

## 2021-07-25 ENCOUNTER — HOSPITAL ENCOUNTER (EMERGENCY)
Age: 86
Discharge: HOME | End: 2021-07-25
Attending: EMERGENCY MEDICINE
Payer: MEDICARE

## 2021-07-25 ENCOUNTER — APPOINTMENT (OUTPATIENT)
Dept: GENERAL RADIOLOGY | Age: 86
End: 2021-07-25
Payer: MEDICARE

## 2021-07-25 VITALS
HEIGHT: 72 IN | HEART RATE: 86 BPM | WEIGHT: 166 LBS | TEMPERATURE: 98.1 F | RESPIRATION RATE: 18 BRPM | SYSTOLIC BLOOD PRESSURE: 155 MMHG | BODY MASS INDEX: 22.48 KG/M2 | DIASTOLIC BLOOD PRESSURE: 86 MMHG | OXYGEN SATURATION: 97 %

## 2021-07-25 DIAGNOSIS — S51.812A SKIN TEAR OF LEFT FOREARM WITHOUT COMPLICATION, INITIAL ENCOUNTER: ICD-10-CM

## 2021-07-25 DIAGNOSIS — S40.011A CONTUSION OF RIGHT SHOULDER, INITIAL ENCOUNTER: Primary | ICD-10-CM

## 2021-07-25 PROCEDURE — 99284 EMERGENCY DEPT VISIT MOD MDM: CPT

## 2021-07-25 PROCEDURE — 73030 X-RAY EXAM OF SHOULDER: CPT

## 2021-07-25 ASSESSMENT — PAIN SCALES - GENERAL: PAINLEVEL_OUTOF10: 0

## 2021-07-25 NOTE — ED NOTES
Discharge instructions discussed with patient. Follow up care reviewed. Questions and concerns addressed. Patient signs discharge form.       Trey Ferguson RN  07/25/21 3232

## 2021-07-25 NOTE — ED NOTES
Discharge instructions discussed with patient. Follow up care reviewed. Questions and concerns addressed. Patient signs discharge form.       Marek Kelley RN  07/25/21 4901

## 2021-07-25 NOTE — ED PROVIDER NOTES
HPI:  7/25/21,   Time: 1:06 AM EDT         Chula Farrar is a 80 y.o. male presenting to the ED for right shoulder pain after fall earlier this evening and also skin tear to left elbow no loss of consciousness or head injury and denies neck pain, beginning just prior to arrival ago. Moderate pain especially with movement of the shoulder and states he felt a crunch when he hit. ROS:   Pertinent positives and negatives are stated within HPI, all other systems reviewed and are negative.  --------------------------------------------- PAST HISTORY ---------------------------------------------  Past Medical History:  has a past medical history of Hypertension. Past Surgical History:  has a past surgical history that includes hernia repair and hip surgery (Right, 12/07/2017). Social History:  reports that he quit smoking about 76 years ago. He quit after 2.00 years of use. He has never used smokeless tobacco. He reports current alcohol use. He reports that he does not use drugs. Family History: family history is not on file. The patients home medications have been reviewed. Allergies: Patient has no known allergies. -------------------------------------------------- RESULTS -------------------------------------------------  All laboratory and radiology results have been personally reviewed by myself   LABS:  No results found for this visit on 07/25/21. RADIOLOGY:  Interpreted by Radiologist.  XR SHOULDER RIGHT (MIN 2 VIEWS)    (Results Pending)       ------------------------- NURSING NOTES AND VITALS REVIEWED ---------------------------   The nursing notes within the ED encounter and vital signs as below have been reviewed.    BP (!) 164/122   Pulse 78   Temp 97.8 °F (36.6 °C) (Oral)   Resp 16   Ht 6' (1.829 m)   Wt 166 lb (75.3 kg)   SpO2 98%   BMI 22.51 kg/m²   Oxygen Saturation Interpretation: Normal      ---------------------------------------------------PHYSICAL EXAM--------------------------------------      Constitutional/General: Alert and oriented x3, well appearing, non toxic in NAD  Head: NC/AT  Eyes: PERRL, EOMI  Mouth: Oropharynx clear, handling secretions, no trismus  Neck: Supple, full ROM, no meningeal signs  Pulmonary: Lungs clear to auscultation bilaterally, no wheezes, rales, or rhonchi. Not in respiratory distress  Cardiovascular:  Regular rate and rhythm, no murmurs, gallops, or rubs. 2+ distal pulses  Abdomen: Soft, non tender, non distended,   Extremities: Moves all extremities x 4. Warm and well perfused, right shoulder moderate tenderness to palpation over the proximal anterior aspect of the right shoulder, left arm: Skin tear noted but full range of motion throughout left elbow   skin: warm and dry without rash  Neurologic: GCS 15,  Psych: Normal Affect      ------------------------------ ED COURSE/MEDICAL DECISION MAKING----------------------  Medications - No data to display      Medical Decision Making:    Apply wound care to skin tear of left arm and x-ray of right shoulder to rule out fracture, patient states he is able to ambulate on his own with a walker therefore to be discharged home    Counseling: The emergency provider has spoken with the patient and discussed todays results, in addition to providing specific details for the plan of care and counseling regarding the diagnosis and prognosis. Questions are answered at this time and they are agreeable with the plan.      --------------------------------- IMPRESSION AND DISPOSITION ---------------------------------    IMPRESSION  1. Contusion of right shoulder, initial encounter New Problem   2.  Skin tear of left forearm without complication, initial encounter New Problem       DISPOSITION  Disposition: Discharge to home  Patient condition is stable                  Kecia Butterfield MD  07/25/21 0701

## 2021-12-08 ENCOUNTER — APPOINTMENT (OUTPATIENT)
Dept: GENERAL RADIOLOGY | Age: 86
DRG: 280 | End: 2021-12-08
Payer: MEDICARE

## 2021-12-08 ENCOUNTER — HOSPITAL ENCOUNTER (INPATIENT)
Age: 86
LOS: 3 days | Discharge: NURSING, REHAB OR LONG-TERM CARE | DRG: 280 | End: 2021-12-11
Attending: EMERGENCY MEDICINE | Admitting: INTERNAL MEDICINE
Payer: MEDICARE

## 2021-12-08 DIAGNOSIS — I50.43 ACUTE ON CHRONIC COMBINED SYSTOLIC AND DIASTOLIC CONGESTIVE HEART FAILURE (HCC): Primary | ICD-10-CM

## 2021-12-08 PROBLEM — I50.41 ACUTE COMBINED SYSTOLIC AND DIASTOLIC CHF, NYHA CLASS 1 (HCC): Status: ACTIVE | Noted: 2021-12-08

## 2021-12-08 LAB
ALBUMIN SERPL-MCNC: 3.9 G/DL (ref 3.5–5.2)
ALP BLD-CCNC: 78 U/L (ref 40–129)
ALT SERPL-CCNC: 27 U/L (ref 0–40)
ANION GAP SERPL CALCULATED.3IONS-SCNC: 8 MMOL/L (ref 7–16)
AST SERPL-CCNC: 52 U/L (ref 0–39)
BACTERIA: ABNORMAL /HPF
BASOPHILS ABSOLUTE: 0.02 E9/L (ref 0–0.2)
BASOPHILS RELATIVE PERCENT: 0.3 % (ref 0–2)
BILIRUB SERPL-MCNC: 0.4 MG/DL (ref 0–1.2)
BILIRUBIN URINE: NEGATIVE
BLOOD, URINE: ABNORMAL
BUN BLDV-MCNC: 22 MG/DL (ref 6–23)
CALCIUM SERPL-MCNC: 9.2 MG/DL (ref 8.6–10.2)
CHLORIDE BLD-SCNC: 104 MMOL/L (ref 98–107)
CLARITY: CLEAR
CO2: 23 MMOL/L (ref 22–29)
COLOR: YELLOW
CREAT SERPL-MCNC: 0.8 MG/DL (ref 0.7–1.2)
EKG ATRIAL RATE: 67 BPM
EKG P AXIS: 80 DEGREES
EKG P-R INTERVAL: 240 MS
EKG Q-T INTERVAL: 406 MS
EKG QRS DURATION: 94 MS
EKG QTC CALCULATION (BAZETT): 429 MS
EKG R AXIS: -8 DEGREES
EKG T AXIS: 109 DEGREES
EKG VENTRICULAR RATE: 67 BPM
EOSINOPHILS ABSOLUTE: 0 E9/L (ref 0.05–0.5)
EOSINOPHILS RELATIVE PERCENT: 0 % (ref 0–6)
GFR AFRICAN AMERICAN: >60
GFR NON-AFRICAN AMERICAN: >60 ML/MIN/1.73
GLUCOSE BLD-MCNC: 113 MG/DL (ref 74–99)
GLUCOSE URINE: NEGATIVE MG/DL
HCT VFR BLD CALC: 44.5 % (ref 37–54)
HEMOGLOBIN: 14.4 G/DL (ref 12.5–16.5)
IMMATURE GRANULOCYTES #: 0.03 E9/L
IMMATURE GRANULOCYTES %: 0.5 % (ref 0–5)
KETONES, URINE: ABNORMAL MG/DL
LEUKOCYTE ESTERASE, URINE: NEGATIVE
LYMPHOCYTES ABSOLUTE: 1.04 E9/L (ref 1.5–4)
LYMPHOCYTES RELATIVE PERCENT: 15.9 % (ref 20–42)
MCH RBC QN AUTO: 32.7 PG (ref 26–35)
MCHC RBC AUTO-ENTMCNC: 32.4 % (ref 32–34.5)
MCV RBC AUTO: 100.9 FL (ref 80–99.9)
MONOCYTES ABSOLUTE: 0.42 E9/L (ref 0.1–0.95)
MONOCYTES RELATIVE PERCENT: 6.4 % (ref 2–12)
NEUTROPHILS ABSOLUTE: 5.04 E9/L (ref 1.8–7.3)
NEUTROPHILS RELATIVE PERCENT: 76.9 % (ref 43–80)
NITRITE, URINE: NEGATIVE
PDW BLD-RTO: 14.6 FL (ref 11.5–15)
PH UA: 5.5 (ref 5–9)
PLATELET # BLD: 178 E9/L (ref 130–450)
PMV BLD AUTO: 9.5 FL (ref 7–12)
POTASSIUM REFLEX MAGNESIUM: 5.4 MMOL/L (ref 3.5–5)
PRO-BNP: ABNORMAL PG/ML (ref 0–450)
PROTEIN UA: NEGATIVE MG/DL
RBC # BLD: 4.41 E12/L (ref 3.8–5.8)
RBC UA: ABNORMAL /HPF (ref 0–2)
REASON FOR REJECTION: NORMAL
REJECTED TEST: NORMAL
SARS-COV-2, NAAT: NOT DETECTED
SODIUM BLD-SCNC: 135 MMOL/L (ref 132–146)
SPECIFIC GRAVITY UA: >=1.03 (ref 1–1.03)
TOTAL PROTEIN: 6.8 G/DL (ref 6.4–8.3)
TROPONIN, HIGH SENSITIVITY: 77 NG/L (ref 0–11)
TROPONIN, HIGH SENSITIVITY: 79 NG/L (ref 0–11)
TROPONIN, HIGH SENSITIVITY: 83 NG/L (ref 0–11)
UROBILINOGEN, URINE: 0.2 E.U./DL
WBC # BLD: 6.6 E9/L (ref 4.5–11.5)
WBC UA: ABNORMAL /HPF (ref 0–5)

## 2021-12-08 PROCEDURE — 80053 COMPREHEN METABOLIC PANEL: CPT

## 2021-12-08 PROCEDURE — 2060000000 HC ICU INTERMEDIATE R&B

## 2021-12-08 PROCEDURE — 96374 THER/PROPH/DIAG INJ IV PUSH: CPT

## 2021-12-08 PROCEDURE — 71045 X-RAY EXAM CHEST 1 VIEW: CPT

## 2021-12-08 PROCEDURE — 93010 ELECTROCARDIOGRAM REPORT: CPT | Performed by: INTERNAL MEDICINE

## 2021-12-08 PROCEDURE — 93005 ELECTROCARDIOGRAM TRACING: CPT | Performed by: PHYSICIAN ASSISTANT

## 2021-12-08 PROCEDURE — 84484 ASSAY OF TROPONIN QUANT: CPT

## 2021-12-08 PROCEDURE — 99283 EMERGENCY DEPT VISIT LOW MDM: CPT

## 2021-12-08 PROCEDURE — 6360000002 HC RX W HCPCS: Performed by: EMERGENCY MEDICINE

## 2021-12-08 PROCEDURE — 87635 SARS-COV-2 COVID-19 AMP PRB: CPT

## 2021-12-08 PROCEDURE — 83880 ASSAY OF NATRIURETIC PEPTIDE: CPT

## 2021-12-08 PROCEDURE — 85025 COMPLETE CBC W/AUTO DIFF WBC: CPT

## 2021-12-08 PROCEDURE — 81001 URINALYSIS AUTO W/SCOPE: CPT

## 2021-12-08 RX ORDER — FUROSEMIDE 10 MG/ML
20 INJECTION INTRAMUSCULAR; INTRAVENOUS ONCE
Status: COMPLETED | OUTPATIENT
Start: 2021-12-08 | End: 2021-12-08

## 2021-12-08 RX ADMIN — FUROSEMIDE 20 MG: 10 INJECTION, SOLUTION INTRAMUSCULAR; INTRAVENOUS at 17:54

## 2021-12-08 NOTE — ED NOTES
FIRST PROVIDER CONTACT ASSESSMENT NOTE       Department of Emergency Medicine   12/8/21  11:26 AM EST    Chief Complaint: Shortness of Breath    History of Present Illness:   Carmine Pagan is a 80 y.o. male who presents to the ED for shortness of breath and cough that has been worsening over the past couple days. Patient was sent in by nursing home for \"possible pneumonia. \" Patient does not normally require oxygen. He is 89% on room air at triage and put on 3L nasal cannula. Reviewed NH paper work- Patient started on Dennis     Focused Physical Exam:  VS:  BP (!) 164/63   Pulse 72   Temp 97.1 °F (36.2 °C)   Resp 16   Ht 6' (1.829 m)   Wt 166 lb (75.3 kg)   SpO2 90%   BMI 22.51 kg/m²      General: Alert and in no apparent distress   CVS: Regular rate for age, normal rhythm  Resp: Slightly diminished, 89% room air   Patient put on 3L nasal cannula and is now 93%     Medical History:  has a past medical history of Hypertension. Surgical History:  has a past surgical history that includes hernia repair and hip surgery (Right, 12/07/2017). Social History:  reports that he quit smoking about 76 years ago. He quit after 2.00 years of use. He has never used smokeless tobacco. He reports current alcohol use. He reports that he does not use drugs. Family History: family history is not on file. *ALLERGIES*     Patient has no known allergies.      Initial Plan of Care:  Initiate Treatment-Testing, Proceed toTreatment Area When Bed Available for ED Attending/MLP to Continue Care    -----------------END OF FIRST PROVIDER CONTACT ASSESSMENT NOTE--------------  Electronically signed by Monica Cooper PA-C   DD: 12/8/21         Monica Cooper PA-C  12/08/21 1129

## 2021-12-08 NOTE — ED PROVIDER NOTES
HPI:  12/8/21,   Time: 5:48 PM ASH Bennett is a 80 y.o. male presenting to the ED for hypoxia and shortness of breath with dyspnea on exertion and orthopnea, beginning 5 days ago. The complaint has been persistent, severe in severity, and worsened by light exertion, and lying flat. Patient denies a fever he states he has a cough productive sputum that is clear or yellow. He denies any chest pain palpitations melena hematemesis hematochezia. He states that his legs are swollen and that there progressively more swollen since summer. He was started on antibiotics 5 days ago but has gotten progressively more hypoxic. ROS:   Pertinent positives and negatives are stated within HPI, all other systems reviewed and are negative.  --------------------------------------------- PAST HISTORY ---------------------------------------------  Past Medical History:  has a past medical history of Hypertension. Past Surgical History:  has a past surgical history that includes hernia repair and hip surgery (Right, 12/07/2017). Social History:  reports that he quit smoking about 76 years ago. He quit after 2.00 years of use. He has never used smokeless tobacco. He reports current alcohol use. He reports that he does not use drugs. Family History: family history is not on file. The patients home medications have been reviewed. Allergies: Patient has no known allergies.     -------------------------------------------------- RESULTS -------------------------------------------------  All laboratory and radiology results have been personally reviewed by myself   LABS:  Results for orders placed or performed during the hospital encounter of 12/08/21   COVID-19, Rapid    Specimen: Nasopharyngeal Swab   Result Value Ref Range    SARS-CoV-2, NAAT Not Detected Not Detected   CBC Auto Differential   Result Value Ref Range    WBC 6.6 4.5 - 11.5 E9/L    RBC 4.41 3.80 - 5.80 E12/L    Hemoglobin 14.4 12.5 - 16.5 g/dL    Hematocrit 44.5 37.0 - 54.0 %    .9 (H) 80.0 - 99.9 fL    MCH 32.7 26.0 - 35.0 pg    MCHC 32.4 32.0 - 34.5 %    RDW 14.6 11.5 - 15.0 fL    Platelets 575 320 - 933 E9/L    MPV 9.5 7.0 - 12.0 fL    Neutrophils % 76.9 43.0 - 80.0 %    Immature Granulocytes % 0.5 0.0 - 5.0 %    Lymphocytes % 15.9 (L) 20.0 - 42.0 %    Monocytes % 6.4 2.0 - 12.0 %    Eosinophils % 0.0 0.0 - 6.0 %    Basophils % 0.3 0.0 - 2.0 %    Neutrophils Absolute 5.04 1.80 - 7.30 E9/L    Immature Granulocytes # 0.03 E9/L    Lymphocytes Absolute 1.04 (L) 1.50 - 4.00 E9/L    Monocytes Absolute 0.42 0.10 - 0.95 E9/L    Eosinophils Absolute 0.00 (L) 0.05 - 0.50 E9/L    Basophils Absolute 0.02 0.00 - 0.20 E9/L   Comprehensive Metabolic Panel w/ Reflex to MG   Result Value Ref Range    Sodium 135 132 - 146 mmol/L    Potassium reflex Magnesium 5.4 (H) 3.5 - 5.0 mmol/L    Chloride 104 98 - 107 mmol/L    CO2 23 22 - 29 mmol/L    Anion Gap 8 7 - 16 mmol/L    Glucose 113 (H) 74 - 99 mg/dL    BUN 22 6 - 23 mg/dL    CREATININE 0.8 0.7 - 1.2 mg/dL    GFR Non-African American >60 >=60 mL/min/1.73    GFR African American >60     Calcium 9.2 8.6 - 10.2 mg/dL    Total Protein 6.8 6.4 - 8.3 g/dL    Albumin 3.9 3.5 - 5.2 g/dL    Total Bilirubin 0.4 0.0 - 1.2 mg/dL    Alkaline Phosphatase 78 40 - 129 U/L    ALT 27 0 - 40 U/L    AST 52 (H) 0 - 39 U/L   Brain Natriuretic Peptide   Result Value Ref Range    Pro-BNP 15,156 (H) 0 - 450 pg/mL   Urinalysis   Result Value Ref Range    Color, UA Yellow Straw/Yellow    Clarity, UA Clear Clear    Glucose, Ur Negative Negative mg/dL    Bilirubin Urine Negative Negative    Ketones, Urine TRACE (A) Negative mg/dL    Specific Gravity, UA >=1.030 1.005 - 1.030    Blood, Urine TRACE-INTACT Negative    pH, UA 5.5 5.0 - 9.0    Protein, UA Negative Negative mg/dL    Urobilinogen, Urine 0.2 <2.0 E.U./dL    Nitrite, Urine Negative Negative    Leukocyte Esterase, Urine Negative Negative   SPECIMEN REJECTION   Result Value Ref Range    Rejected Test TROP     Reason for Rejection see below    Troponin   Result Value Ref Range    Troponin, High Sensitivity 77 (H) 0 - 11 ng/L   Microscopic Urinalysis   Result Value Ref Range    WBC, UA NONE 0 - 5 /HPF    RBC, UA 0-1 0 - 2 /HPF    Bacteria, UA RARE (A) None Seen /HPF   EKG 12 Lead   Result Value Ref Range    Ventricular Rate 67 BPM    Atrial Rate 67 BPM    P-R Interval 240 ms    QRS Duration 94 ms    Q-T Interval 406 ms    QTc Calculation (Bazett) 429 ms    P Axis 80 degrees    R Axis -8 degrees    T Axis 109 degrees   EKG: This EKG is signed and interpreted by me. Rate: 67  Rhythm: Sinus  Interpretation: Old anterior and old inferior myocardial infarction with minimal ST segment elevation across the precordium and lateral ST segment depression  Comparison: changes compared to previous EKG      RADIOLOGY:  Interpreted by Radiologist.  XR CHEST 1 VIEW   Final Result   Bilateral lower lobe infiltrates      Probable trace bilateral pleural effusions.             ------------------------- NURSING NOTES AND VITALS REVIEWED ---------------------------   The nursing notes within the ED encounter and vital signs as below have been reviewed. BP (!) 183/89   Pulse 74   Temp 97.1 °F (36.2 °C)   Resp 18   Ht 6' (1.829 m)   Wt 166 lb (75.3 kg)   SpO2 96%   BMI 22.51 kg/m²   Oxygen Saturation Interpretation: Abnormal      ---------------------------------------------------PHYSICAL EXAM--------------------------------------      Constitutional/General: Alert and oriented x3, well appearing, non toxic in NAD  Head: NC/AT  Eyes: PERRL, EOMI  Mouth: Oropharynx clear, handling secretions, no trismus  Neck: Supple, full ROM, no meningeal signs positive JVD  Pulmonary: Lungs reveal bilateral crackles with decreased breath sounds in the bases not in respiratory distress  Cardiovascular:  Regular rate and rhythm, no murmurs, gallops, or rubs.  2+ distal pulses  Abdomen: Soft, non tender, non distended, Extremities: Moves all extremities x 4. Warm and well perfused 2+ edema bilateral lower extremities patient  Skin: warm and dry without rash  Neurologic: GCS 15,  Psych: Normal Affect      A bedside  ultrasound reveals the patient has no evidence of pericardial effusion, but the patient is noted to have multiple B-lines bilaterally and is noted to have a dilated inferior vena cava    ------------------------------ ED COURSE/MEDICAL DECISION MAKING----------------------  Medications   furosemide (LASIX) injection 20 mg (20 mg IntraVENous Given 12/8/21 7354)         Medical Decision Making:    Patient was given a small dose of Lasix aspirin and placed on a cardiac monitor and oxygen discussed with his primary care doctor and admitted to the hospital    Counseling: The emergency provider has spoken with the patient and family member patient and son and discussed todays results, in addition to providing specific details for the plan of care and counseling regarding the diagnosis and prognosis. Questions are answered at this time and they are agreeable with the plan.      --------------------------------- IMPRESSION AND DISPOSITION ---------------------------------    IMPRESSION  1.  Acute on chronic combined systolic and diastolic congestive heart failure ([facility] [address]        DISPOSITION  Disposition: Admit to telemetry  Patient condition is serious                  Archana Pardo MD  12/08/21 2009

## 2021-12-09 PROBLEM — I44.0 FIRST DEGREE AV BLOCK: Status: ACTIVE | Noted: 2021-12-09

## 2021-12-09 PROBLEM — I50.9 CHF (CONGESTIVE HEART FAILURE) (HCC): Status: ACTIVE | Noted: 2021-12-09

## 2021-12-09 PROBLEM — I21.4 NON-STEMI (NON-ST ELEVATED MYOCARDIAL INFARCTION) (HCC): Status: ACTIVE | Noted: 2021-12-09

## 2021-12-09 LAB
ANION GAP SERPL CALCULATED.3IONS-SCNC: 7 MMOL/L (ref 7–16)
BUN BLDV-MCNC: 23 MG/DL (ref 6–23)
CALCIUM SERPL-MCNC: 9 MG/DL (ref 8.6–10.2)
CHLORIDE BLD-SCNC: 106 MMOL/L (ref 98–107)
CO2: 25 MMOL/L (ref 22–29)
CREAT SERPL-MCNC: 0.9 MG/DL (ref 0.7–1.2)
GFR AFRICAN AMERICAN: >60
GFR NON-AFRICAN AMERICAN: >60 ML/MIN/1.73
GLUCOSE BLD-MCNC: 123 MG/DL (ref 74–99)
HCT VFR BLD CALC: 41.9 % (ref 37–54)
HEMOGLOBIN: 13.8 G/DL (ref 12.5–16.5)
MCH RBC QN AUTO: 32.5 PG (ref 26–35)
MCHC RBC AUTO-ENTMCNC: 32.9 % (ref 32–34.5)
MCV RBC AUTO: 98.8 FL (ref 80–99.9)
PDW BLD-RTO: 14.6 FL (ref 11.5–15)
PLATELET # BLD: 173 E9/L (ref 130–450)
PMV BLD AUTO: 9.2 FL (ref 7–12)
POTASSIUM SERPL-SCNC: 5.3 MMOL/L (ref 3.5–5)
RBC # BLD: 4.24 E12/L (ref 3.8–5.8)
SODIUM BLD-SCNC: 138 MMOL/L (ref 132–146)
WBC # BLD: 7.1 E9/L (ref 4.5–11.5)

## 2021-12-09 PROCEDURE — 36415 COLL VENOUS BLD VENIPUNCTURE: CPT

## 2021-12-09 PROCEDURE — 6370000000 HC RX 637 (ALT 250 FOR IP): Performed by: INTERNAL MEDICINE

## 2021-12-09 PROCEDURE — 2060000000 HC ICU INTERMEDIATE R&B

## 2021-12-09 PROCEDURE — 85027 COMPLETE CBC AUTOMATED: CPT

## 2021-12-09 PROCEDURE — 6360000002 HC RX W HCPCS: Performed by: INTERNAL MEDICINE

## 2021-12-09 PROCEDURE — 80048 BASIC METABOLIC PNL TOTAL CA: CPT

## 2021-12-09 PROCEDURE — 2700000000 HC OXYGEN THERAPY PER DAY

## 2021-12-09 RX ORDER — CARBAMAZEPINE 200 MG/1
200 TABLET ORAL
Status: DISCONTINUED | OUTPATIENT
Start: 2021-12-09 | End: 2021-12-11 | Stop reason: HOSPADM

## 2021-12-09 RX ORDER — NAFTIFINE HYDROCHLORIDE 20 MG/G
1 CREAM TOPICAL DAILY
Status: DISCONTINUED | OUTPATIENT
Start: 2021-12-09 | End: 2021-12-09

## 2021-12-09 RX ORDER — FINASTERIDE 5 MG/1
5 TABLET, FILM COATED ORAL
Status: DISCONTINUED | OUTPATIENT
Start: 2021-12-09 | End: 2021-12-11 | Stop reason: HOSPADM

## 2021-12-09 RX ORDER — ASPIRIN 81 MG/1
324 TABLET, CHEWABLE ORAL ONCE
Status: COMPLETED | OUTPATIENT
Start: 2021-12-09 | End: 2021-12-09

## 2021-12-09 RX ORDER — PANTOPRAZOLE SODIUM 20 MG/1
20 TABLET, DELAYED RELEASE ORAL
Status: DISCONTINUED | OUTPATIENT
Start: 2021-12-09 | End: 2021-12-11 | Stop reason: HOSPADM

## 2021-12-09 RX ORDER — CLOPIDOGREL BISULFATE 75 MG/1
150 TABLET ORAL ONCE
Status: COMPLETED | OUTPATIENT
Start: 2021-12-09 | End: 2021-12-09

## 2021-12-09 RX ORDER — CLOTRIMAZOLE 1 %
CREAM (GRAM) TOPICAL 2 TIMES DAILY
Status: DISCONTINUED | OUTPATIENT
Start: 2021-12-09 | End: 2021-12-11 | Stop reason: HOSPADM

## 2021-12-09 RX ORDER — METOPROLOL SUCCINATE 25 MG/1
12.5 TABLET, EXTENDED RELEASE ORAL
Status: DISCONTINUED | OUTPATIENT
Start: 2021-12-09 | End: 2021-12-09

## 2021-12-09 RX ORDER — ASPIRIN 81 MG/1
162 TABLET, CHEWABLE ORAL DAILY
Status: DISCONTINUED | OUTPATIENT
Start: 2021-12-09 | End: 2021-12-11 | Stop reason: HOSPADM

## 2021-12-09 RX ORDER — TAMSULOSIN HYDROCHLORIDE 0.4 MG/1
0.4 CAPSULE ORAL
Status: DISCONTINUED | OUTPATIENT
Start: 2021-12-09 | End: 2021-12-11 | Stop reason: HOSPADM

## 2021-12-09 RX ORDER — CLOPIDOGREL BISULFATE 75 MG/1
75 TABLET ORAL DAILY
Status: DISCONTINUED | OUTPATIENT
Start: 2021-12-10 | End: 2021-12-11 | Stop reason: HOSPADM

## 2021-12-09 RX ORDER — FUROSEMIDE 10 MG/ML
20 INJECTION INTRAMUSCULAR; INTRAVENOUS 2 TIMES DAILY
Status: DISPENSED | OUTPATIENT
Start: 2021-12-09 | End: 2021-12-10

## 2021-12-09 RX ORDER — M-VIT,TX,IRON,MINS/CALC/FOLIC 27MG-0.4MG
1 TABLET ORAL
Status: DISCONTINUED | OUTPATIENT
Start: 2021-12-09 | End: 2021-12-11 | Stop reason: HOSPADM

## 2021-12-09 RX ORDER — NITROGLYCERIN 0.4 MG/1
0.4 TABLET SUBLINGUAL EVERY 5 MIN PRN
Status: DISCONTINUED | OUTPATIENT
Start: 2021-12-09 | End: 2021-12-11 | Stop reason: HOSPADM

## 2021-12-09 RX ORDER — FUROSEMIDE 10 MG/ML
20 INJECTION INTRAMUSCULAR; INTRAVENOUS DAILY
Status: DISCONTINUED | OUTPATIENT
Start: 2021-12-09 | End: 2021-12-09

## 2021-12-09 RX ADMIN — PANTOPRAZOLE SODIUM 20 MG: 20 TABLET, DELAYED RELEASE ORAL at 09:33

## 2021-12-09 RX ADMIN — FUROSEMIDE 20 MG: 10 INJECTION, SOLUTION INTRAMUSCULAR; INTRAVENOUS at 17:12

## 2021-12-09 RX ADMIN — CARBAMAZEPINE 200 MG: 200 TABLET ORAL at 06:14

## 2021-12-09 RX ADMIN — ENOXAPARIN SODIUM 80 MG: 100 INJECTION SUBCUTANEOUS at 09:28

## 2021-12-09 RX ADMIN — ASPIRIN 81 MG CHEWABLE TABLET 162 MG: 81 TABLET CHEWABLE at 09:29

## 2021-12-09 RX ADMIN — FINASTERIDE 5 MG: 5 TABLET, FILM COATED ORAL at 06:12

## 2021-12-09 RX ADMIN — ASPIRIN 81 MG CHEWABLE TABLET 324 MG: 81 TABLET CHEWABLE at 06:12

## 2021-12-09 RX ADMIN — TAMSULOSIN HYDROCHLORIDE 0.4 MG: 0.4 CAPSULE ORAL at 06:12

## 2021-12-09 RX ADMIN — TAMSULOSIN HYDROCHLORIDE 0.4 MG: 0.4 CAPSULE ORAL at 17:12

## 2021-12-09 RX ADMIN — METOPROLOL TARTRATE 25 MG: 25 TABLET, FILM COATED ORAL at 21:38

## 2021-12-09 RX ADMIN — CLOTRIMAZOLE: 10 CREAM TOPICAL at 21:38

## 2021-12-09 RX ADMIN — FUROSEMIDE 20 MG: 10 INJECTION, SOLUTION INTRAMUSCULAR; INTRAVENOUS at 09:29

## 2021-12-09 RX ADMIN — METOPROLOL TARTRATE 25 MG: 25 TABLET, FILM COATED ORAL at 06:12

## 2021-12-09 RX ADMIN — CLOPIDOGREL BISULFATE 150 MG: 75 TABLET ORAL at 09:29

## 2021-12-09 RX ADMIN — MULTIPLE VITAMINS W/ MINERALS TAB 1 TABLET: TAB at 06:14

## 2021-12-09 NOTE — PROGRESS NOTES
Physical Therapy    Facility/Department: [address] INTERMEDIATE      NAME: Violette Tracy  : 1922  MRN: [id number]    Date of Service: 2021    Attempted to see pt for PT evaluaiton. Pt states he wants to visit with his son right not. Will attempt at a later time.    [place] PT [id number]

## 2021-12-09 NOTE — CONSULTS
[facility]    Name: Bipin Moore    Age: 80 y.o. Date of Admission: 2021  5:10 PM    Date of Service: 2021    Reason for Consultation: Non-STEMI    Chief Complaint: Dyspnea and shortness of breath x5 days    Referring Physician: Dr. Mei Russell    History of Present Illness: The patient is a 80y.o. year old male who presents from nursing home after 5 days of dyspnea and shortness of breath, of moderate intensity and no PND/orthopnea. Denies chest pain, palpitations, syncope or near syncope. proBNP 15,000 with initial troponin 83. Currently resting comfortably and on 2 L per nasal cannula with SPO2 94%. Past Medical History:   Past Medical History:   Diagnosis Date    Hypertension        Past Surgical History:  Past Surgical History:   Procedure Laterality Date    HERNIA REPAIR      HIP SURGERY Right 2017    ORIF right hip  Dr. Reji Myers       Family History:  No family history on file. Social History:  Social History     Socioeconomic History    Marital status:      Spouse name: Not on file    Number of children: Not on file    Years of education: Not on file    Highest education level: Not on file   Occupational History    Not on file   Tobacco Use    Smoking status: Former Smoker     Years: 2.00     Quit date: 3/3/1945     Years since quittin.8    Smokeless tobacco: Never Used   Substance and Sexual Activity    Alcohol use: Yes     Comment: occasional    Drug use: No    Sexual activity: Not on file   Other Topics Concern    Not on file   Social History Narrative    Not on file     Social Determinants of Health     Financial Resource Strain:     Difficulty of Paying Living Expenses: Not on file   Food Insecurity:     Worried About [address] [employer] in the Last Year: Not on file    Dennis of Food in the Last Year: Not on file   Transportation Needs:     Lack of Transportation (Medical):  Not on file    Lack of Transportation (Non-Medical): Not on file   Physical Activity:     Days of Exercise per Week: Not on file    Minutes of Exercise per Session: Not on file   Stress:     Feeling of Stress : Not on file   Social Connections:     Frequency of Communication with Friends and Family: Not on file    Frequency of Social Gatherings with Friends and Family: Not on file    Attends Judaism Services: Not on file    Active Member of [address] [place] [address] or Organizations: Not on file    Attends Club or Organization Meetings: Not on file    Marital Status: Not on file   Intimate Partner Violence:     Fear of Current or Ex-Partner: Not on file    Emotionally Abused: Not on file    Physically Abused: Not on file    Sexually Abused: Not on file   Housing Stability:     Unable to Pay for Housing in the Last Year: Not on file    Number of [place] in the Last Year: Not on file    Unstable Housing in the Last Year: Not on file       Allergies:  No Known Allergies    Home Meds:  Prior to Admission medications    Medication Sig Start Date End Date Taking? Authorizing Provider   Naftifine HCl 2 % CREA Apply 1 g topically daily 6/1/20   Elidia Christian, DPM   clotrimazole-betamethasone (LOTRISONE) 1-0.05 % cream Apply topically 2 times daily.  2/26/20   Be[place] Osmond., DPM   furosemide (LASIX) 20 MG tablet Take 1 tablet by mouth daily 5/17/19   Tina Glass MD   finasteride (PROSCAR) 5 MG tablet Take 5 mg by mouth every morning (before breakfast)    Historical Provider, MD   carBAMazepine (TEGRETOL) 200 MG tablet Take 200 mg by mouth every morning (before breakfast)    Historical Provider, MD   metoprolol succinate (TOPROL XL) 25 MG extended release tablet Take 12.5 mg by mouth every morning (before breakfast)    Historical Provider, MD   Multiple Vitamins-Minerals (THERAPEUTIC MULTIVITAMIN-MINERALS) tablet Take 1 tablet by mouth every morning (before breakfast)    Historical Provider, MD   tamsulosin (FLOMAX) 0.4 MG capsule Take 0.4 mg by mouth Daily with supper    Historical Provider, MD       Current Medications:  Current Facility-Administered Medications   Medication Dose Route Frequency Provider Last Rate Last Admin    furosemide (LASIX) injection 20 mg  20 mg IntraVENous Daily Robert Perez MD        carBAMazepine (TEGRETOL) tablet 200 mg  200 mg Oral QAM AC Robert Perez MD   200 mg at 12/09/21 0551    finasteride (PROSCAR) tablet 5 mg  5 mg Oral STEFFANYM GENIE Perez MD   5 mg at 12/09/21 5773    therapeutic multivitamin-minerals 1 tablet  1 tablet Oral QAM AC Robert Perez MD   1 tablet at 12/09/21 9104    Naftifine HCl 2 % CREA 1 g  1 g Apply externally Daily Robert Perez MD        [facility]) capsule 0.4 mg  0.4 mg Oral Dinner Robert Perez MD   0.4 mg at 12/09/21 0612    enoxaparin (LOVENOX) injection 80 mg  1 mg/kg SubCUTAneous BID Robert Perez MD        nitroGLYCERIN (NITROSTAT) SL tablet 0.4 mg  0.4 mg SubLINGual Q5 Min PRN Robert Perez MD        aspirin chewable tablet 162 mg  162 mg Oral Daily Robert Perez MD        perflutren lipid microspheres (DEFINITY) injection 1.65 mg  1.5 mL IntraVENous ONCE PRN Robert Perez MD        metoprolol tartrate (LOPRESSOR) tablet 25 mg  25 mg Oral BID Robert Perez MD   25 mg at 12/09/21 8020         Review of Systems:   Constitutional: No fever, chills, sweats  Cardiac: As per HPI  Pulmonary: No cough, wheeze, hemoptysis  HEENT: No visual disturbances or difficult swallowing  GI: No nausea, vomiting, diarrhea, abdominal pain, rectal bleeding  : No dysuria or hematuria  Endocrine: No excessive thirst, heat or cold intolerance. Musculoskeletal: No joint pain or muscle aches.  No claudication  Skin: No skin breakdown or rashes  Neuro: No headache, confusion, or seizures  Psych: No depression, anxiety    Physical Exam:  BP (!) 177/86   Pulse 76   Temp 98.1 °F (36.7 °C) (Oral)   Resp 18   Ht 6' (1.829 m)   Wt 167 lb 1.6 oz (75.8 kg)   SpO2 94%   BMI 22.66 kg/m²   Weight change: Wt Readings from Last 3 Encounters:   12/09/21 167 lb 1.6 oz (75.8 kg)   07/25/21 166 lb (75.3 kg)   03/06/21 166 lb (75.3 kg)       General: Awake, alert, oriented x3, no acute distress    HEENT: Normocephalic and atraumatic, pupils equal and round. Sclera nonicteric and oral mucosa moist.  Tongue and trachea midline. Neck: No JVD or bruits. No thyroid enlargement or adenopathy    Cardiac: Regular rate and rhythm, normal S1 and S2, no S3. Apical impulse is normal.  No murmurs, rubs or clicks. No carotid bruits and no abdominal bruits. No peripheral edema, cyanosis or clubbing. Normal pulses in the upper and lower extremities bilaterally. Resp: Unlabored respirations at rest.  Few crackles at the bases. No wheezes. Abdomen: soft, nontender, nondistended, no gross organomegaly or mass    Skin: Warm and dry, no cyanosis. Musculoskeletal: normal tone and strength in the upper and lower extremities bilaterally    Neuro: Moves all extremities appropriately to command. Normal sensation to light touch in the upper and lower extremities bilaterally    Psych: Cooperative, and normal affect    Intake/Output:    Intake/Output Summary (Last 24 hours) at 12/9/2021 0640  Last data filed at 12/8/2021 2214  Gross per 24 hour   Intake    Output 510 ml   Net -510 ml     No intake/output data recorded. Laboratory Tests:  Lab Results   Component Value Date    CREATININE 0.9 12/09/2021    BUN 23 12/09/2021     12/09/2021    K 5.3 (H) 12/09/2021     12/09/2021    CO2 25 12/09/2021     No results for input(s): CKTOTAL, CKMB, TROPONINI in the last 72 hours.   Lab Results   Component Value Date    PROBNP 15,156 (H) 12/08/2021     Lab Results   Component Value Date    WBC 7.1 12/09/2021    RBC 4.24 12/09/2021    HGB 13.8 12/09/2021    HCT 41.9 12/09/2021    MCV 98.8 12/09/2021    MCH 32.5 12/09/2021    MCHC 32.9 12/09/2021    RDW 14.6 12/09/2021     12/09/2021    MPV 9.2 12/09/2021 Recent Labs     12/08/21  1246   ALKPHOS 78   ALT 27   AST 52*   PROT 6.8   BILITOT 0.4   LABALBU 3.9     Lab Results   Component Value Date    MG 2.1 10/27/2010     Lab Results   Component Value Date    PROTIME 11.9 03/06/2021    PROTIME 11.8 10/27/2010    INR 1.1 03/06/2021   Chest x-ray, EKG, telemetry independently reviewed:  Chest x-ray bilateral lower lobe infiltrates and small pleural effusions. EKG: Sinus rhythm with first-degree AV block, poor R wave progression. No acute ST or T wave changes. Telemetry: Sinus rhythm  Creatinine 0.8, proBNP 15,156, high-sensitivity troponin 83        Active Hospital Problems    Diagnosis     Non-STEMI (non-ST elevated myocardial infarction) [facility]) [I21.4]     First degree AV block [I44.0]     CHF (congestive heart failure) ([facility]) [I50.9]     Acute combined systolic and diastolic CHF, NYHA class 1 ([facility]) [I50.41]     Essential hypertension [I10]              ASSESSMENT / PLAN:  1. Acute as of yet unspecified CHF with echocardiogram pending. Lasix 20 mg IV twice daily, continue beta-blocker and await echo. 2. Non-STEMI. Conservative medical management with aspirin, clopidogrel and full dose Lovenox during hospitalization. No plans for stress test or heart catheterization. 3. Essential hypertension currently stable. 4. Asymptomatic first-degree AV block.   Continue to follow          Electronically signed by Javier Nunez MD on 12/9/2021 at 6:40 AM

## 2021-12-09 NOTE — H&P
does not use drugs. Family History:   family history is not on file. REVIEW OF SYSTEMS:  As above in the HPI, otherwise negative    PHYSICAL EXAM:    Vitals:  BP (!) 177/86   Pulse 76   Temp 98.1 °F (36.7 °C) (Oral)   Resp 18   Ht 6' (1.829 m)   Wt 167 lb 1.6 oz (75.8 kg)   SpO2 94%   BMI 22.66 kg/m²     General:  Awake, alert, oriented X 3. Well developed, well nourished, well groomed. No apparent distress. HEENT:  Normocephalic, atraumatic. Pupils equal, round, reactive to light. No scleral icterus. No conjunctival injection. Normal lips, teeth, and gums. No nasal discharge. Neck:  Supple  Heart:  RRR, no murmurs, gallops, rubs  Lungs:  CTA bilaterally except ;bilatearl basilar rales, bilat symmetrical expansion, no wheeze or rhonchi  Abdomen:   Bowel sounds present, soft, nontender, no masses, no organomegaly, no peritoneal signs  Extremities:  No clubbing, cyanosis, or edema  Skin:  Warm and dry, no open lesions or rash  Neuro:  Cranial nerves 2-12 intact, no focal deficits  Breast: deferred  Rectal: deferred  Genitalia:  deferred    LABS:  CBC:   Lab Results   Component Value Date    WBC 7.1 12/09/2021    RBC 4.24 12/09/2021    HGB 13.8 12/09/2021    HCT 41.9 12/09/2021    MCV 98.8 12/09/2021    MCH 32.5 12/09/2021    MCHC 32.9 12/09/2021    RDW 14.6 12/09/2021     12/09/2021    MPV 9.2 12/09/2021     CMP:    Lab Results   Component Value Date     12/09/2021    K 5.3 12/09/2021    K 5.4 12/08/2021     12/09/2021    CO2 25 12/09/2021    BUN 23 12/09/2021    CREATININE 0.9 12/09/2021    GFRAA >60 12/09/2021    LABGLOM >60 12/09/2021    GLUCOSE 123 12/09/2021    GLUCOSE 161 10/27/2010    PROT 6.8 12/08/2021    LABALBU 3.9 12/08/2021    CALCIUM 9.0 12/09/2021    BILITOT 0.4 12/08/2021    ALKPHOS 78 12/08/2021    AST 52 12/08/2021    ALT 27 12/08/2021     BMP:    Lab Results   Component Value Date     12/09/2021    K 5.3 12/09/2021    K 5.4 12/08/2021     12/09/2021    CO2 25 12/09/2021    BUN 23 12/09/2021    LABALBU 3.9 12/08/2021    CREATININE 0.9 12/09/2021    CALCIUM 9.0 12/09/2021    GFRAA >60 12/09/2021    LABGLOM >60 12/09/2021    GLUCOSE 123 12/09/2021    GLUCOSE 161 10/27/2010     Troponin:    Lab Results   Component Value Date    TROPONINI <0.01 03/06/2021     Last 3 Troponin:    Lab Results   Component Value Date    TROPONINI <0.01 03/06/2021    TROPONINI <0.01 08/03/2020    TROPONINI <0.01 05/01/2019         ASSESSMENT:      Active Problems:    Essential hypertension  Plan: Control    Acute combined systolic and diastolic CHF, NYHA class 1 ([facility])  Plan: Diuresis    Non-STEMI (non-ST elevated myocardial infarction) ([facility])  Plan: Lovenox/ASA/  Plavix    First degree AV block  Plan: Monitor    [facility]est[facility]) [facility]  Plan: Diuresis      PLAN:    See orders. As above.     Elysa Felty, MD  7:15 AM  12/9/2021

## 2021-12-10 LAB
ANION GAP SERPL CALCULATED.3IONS-SCNC: 9 MMOL/L (ref 7–16)
BUN BLDV-MCNC: 23 MG/DL (ref 6–23)
CALCIUM SERPL-MCNC: 8.7 MG/DL (ref 8.6–10.2)
CHLORIDE BLD-SCNC: 103 MMOL/L (ref 98–107)
CO2: 25 MMOL/L (ref 22–29)
CREAT SERPL-MCNC: 1 MG/DL (ref 0.7–1.2)
GFR AFRICAN AMERICAN: >60
GFR NON-AFRICAN AMERICAN: >60 ML/MIN/1.73
GLUCOSE BLD-MCNC: 93 MG/DL (ref 74–99)
HCT VFR BLD CALC: 39.2 % (ref 37–54)
HEMOGLOBIN: 13.2 G/DL (ref 12.5–16.5)
MCH RBC QN AUTO: 32.6 PG (ref 26–35)
MCHC RBC AUTO-ENTMCNC: 33.7 % (ref 32–34.5)
MCV RBC AUTO: 96.8 FL (ref 80–99.9)
PDW BLD-RTO: 14.4 FL (ref 11.5–15)
PLATELET # BLD: 173 E9/L (ref 130–450)
PMV BLD AUTO: 9.5 FL (ref 7–12)
POTASSIUM SERPL-SCNC: 3.8 MMOL/L (ref 3.5–5)
RBC # BLD: 4.05 E12/L (ref 3.8–5.8)
SODIUM BLD-SCNC: 137 MMOL/L (ref 132–146)
WBC # BLD: 4.9 E9/L (ref 4.5–11.5)

## 2021-12-10 PROCEDURE — 97165 OT EVAL LOW COMPLEX 30 MIN: CPT

## 2021-12-10 PROCEDURE — 80048 BASIC METABOLIC PNL TOTAL CA: CPT

## 2021-12-10 PROCEDURE — 97535 SELF CARE MNGMENT TRAINING: CPT

## 2021-12-10 PROCEDURE — 36415 COLL VENOUS BLD VENIPUNCTURE: CPT

## 2021-12-10 PROCEDURE — 85027 COMPLETE CBC AUTOMATED: CPT

## 2021-12-10 PROCEDURE — 2700000000 HC OXYGEN THERAPY PER DAY

## 2021-12-10 PROCEDURE — 6370000000 HC RX 637 (ALT 250 FOR IP): Performed by: INTERNAL MEDICINE

## 2021-12-10 PROCEDURE — 2060000000 HC ICU INTERMEDIATE R&B

## 2021-12-10 PROCEDURE — 97161 PT EVAL LOW COMPLEX 20 MIN: CPT

## 2021-12-10 PROCEDURE — 97530 THERAPEUTIC ACTIVITIES: CPT

## 2021-12-10 RX ORDER — FUROSEMIDE 40 MG/1
40 TABLET ORAL DAILY
Status: DISCONTINUED | OUTPATIENT
Start: 2021-12-11 | End: 2021-12-11 | Stop reason: HOSPADM

## 2021-12-10 RX ORDER — ASPIRIN 81 MG/1
162 TABLET, CHEWABLE ORAL DAILY
Qty: 30 TABLET | Refills: 3 | OUTPATIENT
Start: 2021-12-10

## 2021-12-10 RX ORDER — FUROSEMIDE 40 MG/1
40 TABLET ORAL DAILY
Qty: 60 TABLET | Refills: 3 | OUTPATIENT
Start: 2021-12-11

## 2021-12-10 RX ORDER — CLOPIDOGREL BISULFATE 75 MG/1
75 TABLET ORAL DAILY
Qty: 30 TABLET | Refills: 3 | OUTPATIENT
Start: 2021-12-10

## 2021-12-10 RX ORDER — PANTOPRAZOLE SODIUM 20 MG/1
20 TABLET, DELAYED RELEASE ORAL
Qty: 30 TABLET | Refills: 3 | OUTPATIENT
Start: 2021-12-11

## 2021-12-10 RX ADMIN — FINASTERIDE 5 MG: 5 TABLET, FILM COATED ORAL at 06:17

## 2021-12-10 RX ADMIN — CLOTRIMAZOLE: 10 CREAM TOPICAL at 13:12

## 2021-12-10 RX ADMIN — TAMSULOSIN HYDROCHLORIDE 0.4 MG: 0.4 CAPSULE ORAL at 18:32

## 2021-12-10 RX ADMIN — ASPIRIN 81 MG CHEWABLE TABLET 162 MG: 81 TABLET CHEWABLE at 13:10

## 2021-12-10 RX ADMIN — CARBAMAZEPINE 200 MG: 200 TABLET ORAL at 06:17

## 2021-12-10 RX ADMIN — PANTOPRAZOLE SODIUM 20 MG: 20 TABLET, DELAYED RELEASE ORAL at 06:17

## 2021-12-10 RX ADMIN — CLOPIDOGREL BISULFATE 75 MG: 75 TABLET ORAL at 13:10

## 2021-12-10 RX ADMIN — CLOTRIMAZOLE: 10 CREAM TOPICAL at 20:57

## 2021-12-10 RX ADMIN — MULTIPLE VITAMINS W/ MINERALS TAB 1 TABLET: TAB at 06:17

## 2021-12-10 RX ADMIN — METOPROLOL TARTRATE 25 MG: 25 TABLET, FILM COATED ORAL at 13:10

## 2021-12-10 RX ADMIN — METOPROLOL TARTRATE 25 MG: 25 TABLET, FILM COATED ORAL at 20:57

## 2021-12-10 ASSESSMENT — PAIN SCALES - GENERAL: PAINLEVEL_OUTOF10: 0

## 2021-12-10 NOTE — PROGRESS NOTES
[facility]     INPATIENT CARDIOLOGY PROGRESS NOTE    Name: Momo Hanson    Age: 80 y.o. Date of Admission: 2021  5:10 PM    Date of Service: 12/10/2021    Reason for Consultation: Non-STEMI    Chief Complaint: Dyspnea and shortness of breath x5 days    Referring Physician: Dr. Karina Rick    History of Present Illness: The patient is a 80y.o. year old male who presents from nursing home after 5 days of dyspnea and shortness of breath, of moderate intensity and no PND/orthopnea. Denies chest pain, palpitations, syncope or near syncope. proBNP 15,000 with initial troponin 83. Currently resting comfortably and on 2 L per nasal cannula with SPO2 94%. 12/10/2021 interim history  Lying flat without shortness of breath and no PND/orthopnea. Denies chest pain or palpitations. SPO2 94% on 2 L. Fluid balance -500 cc and creatinine 1.0. Telemetry: Sinus rhythm  Echo pending    Past Medical History:   Past Medical History:   Diagnosis Date    Hypertension        Past Surgical History:  Past Surgical History:   Procedure Laterality Date    HERNIA REPAIR      HIP SURGERY Right 2017    ORIF right hip  Dr. Rohit Gallo       Family History:  No family history on file. Social History:  Social History     Socioeconomic History    Marital status:       Spouse name: Not on file    Number of children: Not on file    Years of education: Not on file    Highest education level: Not on file   Occupational History    Not on file   Tobacco Use    Smoking status: Former Smoker     Years: 2.00     Quit date: 3/3/1945     Years since quittin.8    Smokeless tobacco: Never Used   Substance and Sexual Activity    Alcohol use: Yes     Comment: occasional    Drug use: No    Sexual activity: Not on file   Other Topics Concern    Not on file   Social History Narrative    Not on file     Social Determinants of Health     Financial Resource Strain:     Difficulty of Paying Living Expenses: Not on file   Food Insecurity:     Worried About Running Out of Food in the Last Year: Not on file    Dennis of Food in the Last Year: Not on file   Transportation Needs:     Lack of Transportation (Medical): Not on file    Lack of Transportation (Non-Medical): Not on file   Physical Activity:     Days of Exercise per Week: Not on file    Minutes of Exercise per Session: Not on file   Stress:     Feeling of Stress : Not on file   Social Connections:     Frequency of Communication with Friends and Family: Not on file    Frequency of Social Gatherings with Friends and Family: Not on file    Attends Yazidism Services: Not on file    Active Member of [address] [place] [address] [employer] or Organizations: Not on file    Attends Club or Organization Meetings: Not on file    Marital Status: Not on file   Intimate Partner Violence:     Fear of Current or Ex-Partner: Not on file    Emotionally Abused: Not on file    Physically Abused: Not on file    Sexually Abused: Not on file   Housing Stability:     Unable to Pay for Housing in the Last Year: Not on file    Number of [place] in the Last Year: Not on file    Unstable Housing in the Last Year: Not on file       Allergies:  No Known Allergies    Home Meds:  Prior to Admission medications    Medication Sig Start Date End Date Taking? Authorizing Provider   Naftifine HCl 2 % CREA Apply 1 g topically daily 6/1/20   Chaparrita Barnacrola Huggins., DPM   clotrimazole-betamethasone (LOTRISONE) 1-0.05 % cream Apply topically 2 times daily.  2/26/20   [address] [place] [address]., DPM   furosemide (LASIX) 20 MG tablet Take 1 tablet by mouth daily 5/17/19   Natasha Guzman MD   finasteride (PROSCAR) 5 MG tablet Take 5 mg by mouth every morning (before breakfast)    Historical Provider, MD   carBAMazepine (TEGRETOL) 200 MG tablet Take 200 mg by mouth every morning (before breakfast)    Historical Provider, MD   metoprolol succinate (TOPROL XL) 25 MG extended release tablet Take 12.5 mg by mouth every morning (before breakfast)    Historical Provider, MD   Multiple Vitamins-Minerals (THERAPEUTIC MULTIVITAMIN-MINERALS) tablet Take 1 tablet by mouth every morning (before breakfast)    Historical Provider, MD   tamsulosin (FLOMAX) 0.4 MG capsule Take 0.4 mg by mouth Daily with supper    Historical Provider, MD       Current Medications:  Current Facility-Administered Medications   Medication Dose Route Frequency Provider Last Rate Last Admin    carBAMazepine (TEGRETOL) tablet 200 mg  200 mg Oral QAM AC Giuliana Campos MD   200 mg at 12/10/21 0617    finasteride (PROSCAR) tablet 5 mg  5 mg Oral QAM AC Giuliana Campos MD   5 mg at 12/10/21 0617    therapeutic multivitamin-minerals 1 tablet  1 tablet Oral QAM AC Giuliana Campos MD   1 tablet at 12/10/21 0617    tamsulosin (FLOMAX) capsule 0.4 mg  0.4 mg Oral Dinner Giuliana Campos MD   0.4 mg at 12/09/21 1712    enoxaparin (LOVENOX) injection 80 mg  1 mg/kg SubCUTAneous BID Giuliana Campos MD   80 mg at 12/09/21 0928    nitroGLYCERIN (NITROSTAT) SL tablet 0.4 mg  0.4 mg SubLINGual Q5 Min PRN Giuliana Campos MD        aspirin chewable tablet 162 mg  162 mg Oral Daily Giuliana Campos MD   162 mg at 12/09/21 9081    perflutren lipid microspheres (DEFINITY) injection 1.65 mg  1.5 mL IntraVENous ONCE PRN Giuliana Campos MD        metoprolol tartrate (LOPRESSOR) tablet 25 mg  25 mg Oral BID Giuliana Campos MD   25 mg at 12/09/21 2138    furosemide (LASIX) injection 20 mg  20 mg IntraVENous BID Reddy Blancas MD   20 mg at 12/09/21 1712    clopidogrel (PLAVIX) tablet 75 mg  75 mg Oral Daily Reddy Blancas MD        pantoprazole (PROTONIX) tablet 20 mg  20 mg Oral QAM AC Reddy Blancas MD   20 mg at 12/10/21 0617    clotrimazole (LOTRIMIN) 1 % cream   Topical BID Giuliana Campos MD   Given at 12/09/21 2138         Review of Systems:   Constitutional: No fever, chills  Cardiac: As per HPI  Pulmonary: No cough, wheeze, hemoptysis  GI: No nausea, vomiting, diarrhea, abdominal pain, rectal MCH 32.6 12/10/2021    MCHC 33.7 12/10/2021    RDW 14.4 12/10/2021     12/10/2021    MPV 9.5 12/10/2021     Recent Labs     12/08/21  1246   ALKPHOS 78   ALT 27   AST 52*   PROT 6.8   BILITOT 0.4   LABALBU 3.9     Lab Results   Component Value Date    MG 2.1 10/27/2010     Lab Results   Component Value Date    PROTIME 11.9 03/06/2021    PROTIME 11.8 10/27/2010    INR 1.1 03/06/2021           Active Hospital Problems    Diagnosis     Non-STEMI (non-ST elevated myocardial infarction) ([facility]) [I21.4]     First degree AV block [I44.0]     CHF (congestive heart failure) ([facility]) [I50.9]     Acute combined systolic and diastolic CHF, NYHA class 1 ([facility]) [I50.41]     Essential hypertension [I10]              ASSESSMENT / PLAN:  1. Acute  unspecified CHF with echocardiogram pending. Stop IV Lasix after this morning's dose. Lasix 40 mg p.o. daily starting tomorrow. (Was on 20 mg previously). 2. Non-STEMI. Conservative medical management with aspirin, clopidogrel and full dose Lovenox during hospitalization. No plans for stress test or heart catheterization. 3. Essential hypertension currently stable. 4. Asymptomatic first-degree AV block. Continue to follow    Home later today or tomorrow.   Office will arrange follow-up appointment              Electronically signed by Susu Dinh MD on 12/10/2021 at 6:21 AM

## 2021-12-10 NOTE — PROGRESS NOTES
Occupational Therapy  OCCUPATIONAL THERAPY INITIAL EVALUATION     [address] [place], [place]       OEYN:  Patient Name: Spencer Bo  MRN: [id number]  : 1922  Room: [address] [place] [address]    Evaluating OT: Radha Bautista OTR/L [id number]    Referring Provider: Nicole Amaral MD  Specific Provider Orders/Date: eval and treat 21    Diagnosis: CHF. Pt presents to ED from [facility].       Pertinent Medical History: HTN     Precautions:  fall risk, O2    Assessment of current deficits   [x] Functional mobility  [x]ADLs  [x] Strength               [x]Cognition   [x] Functional transfers   [x] IADLs         [x] Safety Awareness   [x]Endurance   [] Fine Coordination              [x] Balance      [] Vision/perception   []Sensation    []Gross Motor Coordination  [] ROM  [] Delirium                   [] Motor Control     OT PLAN OF CARE   OT POC based on physician orders, patient diagnosis and results of clinical assessment    Frequency/Duration 2-5 days/wk for 10-14 days PRN   Specific OT Treatment Interventions to include:   * Instruction/training on adapted ADL techniques and AE recommendations to increase functional independence within precautions       * Training on energy conservation strategies, correct breathing pattern and techniques to improve independence/tolerance for self-care routine  * Functional transfer/mobility training/DME recommendations for increased independence, safety, and fall prevention  * Patient/Family education to increase follow through with safety techniques and functional independence  * Recommendation of environmental modifications for increased safety with functional transfers/mobility and ADLs  * Cognitive retraining/development of therapeutic activities to improve problem solving, judgement, memory, and attention for increased safety/participation in ADL/IADL tasks  * Therapeutic exercise to improve motor endurance, ROM, and functional strength for ADLs/functional transfers  * Therapeutic activities to facilitate/challenge dynamic balance, stand tolerance for increased safety and independence with ADLs  * Therapeutic activities to facilitate gross/fine motor skills for increased independence with ADLs  * Positioning to improve skin integrity, interaction with environment and functional independence  * Delirium prevention/treatment    Recommended Adaptive Equipment: TBD     Home Living: Pt lives at [facility]. Pt reports his son comes in twice a week to assist in bathing. Independent in other ADLs. [employer] for mobility. Pain Level: no reported pain    Cognition: A&O: 2-3/4. Pt is oriented to self and hospital. Disoriented to year. Easily confused throughout session. Problem solving:  poor   Judgement/safety:  poor     Functional Assessment: AM-PAC Daily Activity Raw Score: 16/24   Initial Eval Status  Date: 12/10/21 Treatment session:  STGs=LTGS  Timeframe 10-14 days     Feeding Set up     Grooming SBA  Standing sink level for hand hygiene  Independent   UB Dressing Set up  Independent   LB Dressing Mod A  Donning/doffing slippers and management of socks  Mod I    Bathing Mod A  Mod I   Toileting Min A  Use of [employer]  Standing at commode with use of HH urinal   Mod I   Bed Mobility  Sit to supine: SBA     Functional Transfers STS: SBA  Mod I   Functional Mobility CGA with [employer]  Household distance  Assist to manage O2 cord  Mod I during ADLs   Balance Sitting: fair plus    Standing: fair/fair minus     Activity Tolerance fair  standing randy x7-8 min with fair plus balance during self care tasks           ADL: Requires UE support at countertop to maintain standing balance during grooming tasks. Functional endurance training, standing tolerance and incorporation of compensatory strategies during bathroom ADL in preparation for maximum safety and independence during self care tasks in home environment.  Min cues throughout session for follow through of proper technique. Treatment: Patient educated on techniques for completion of ADL, safe functional transfers and functional mobility. Patient required cues for follow through with proper hand/foot placement, pacing, safety, compensatory strategies, breathing and technique in bed mobility, functional transfers, functional mobility, toileting, grooming and LB dressing in preparation for maximum independence in all self care tasks. Hand Dominance: Right   Strength ROM Additional Info:    RUE  4-/5 WFL limited overhead shoulder motions good  and FMC/dexterity noted during ADL tasks     LUE 4-/5 WFL limited overhead shoulder motions good  and FMC/dexterity noted during ADL tasks         Hearing: [place]  Vision: WFL   Sensation:  No c/o numbness or tingling   Tone: WFL   Edema: B LEs                            Rehab Potential: Good for established goals     Patient/Family Goal: To get home. Patient and/or family were instructed on functional diagnosis, prognosis/goals and OT plan of care. Pt verbalized questionable understanding. Upon arrival, patient seated EOB. At end of session, patient supine in bed with call light and phone within reach, all lines and tubes intact. Pt would benefit from continued skilled OT to increase safety and independence with completion of ADL/IADL tasks for functional independence and quality of life.  Bed/chair alarm: ON    Low Evaluation 91895  Time In: 1525   Time Out: 1552  Total: 27 min       Min Units   Therapeutic Ex 78987     Therapeutic Activities 49507     ADL/Self Care 59576 15 1   Orthotic Management 64013     Neuro Re-Ed 80656     TOTAL TIMED TREATMENT 15 1         Evaluation time includes thorough review of current medical information, gathering information on past medical history/social history and prior level of function, completion of standardized testing/informal observation of tasks, assessment of data, and development of POC/Goals    [employer] OTR/L  [id number]

## 2021-12-10 NOTE — PROGRESS NOTES
Left VM for Dr Rosario Goodman to notify of echo dept being closed as of  and that echo I guess will be delayed until tomorrow.

## 2021-12-10 NOTE — CARE COORDINATION
Social Work / Discharge Planning : SW met with patient and explained role as discharge planner/ Transition of care. Patient admitted from [facility]. A.L. . patient states his plan at discharge is to return and his son can transport him., Possible discharge back today. Patient states he uses a walker. Patient currently on 02 and NEW. Will need to test patient prior to discharge. No DME preference if needed. Therapy is ordered. NICOLE to follow. Electronically signed by ELENITA Khalil on 12/10/21 at 8:30 AM EST      Addendum: Therapy am/pac 18/24. If 02 is needed, please call [facility] from [place] [phone]. Will need DME order and qualifying dx with pulse ox. NICOLE to follow.  Electronically signed by ELENITA Khalil on 12/10/21 at 1:39 PM EST

## 2021-12-10 NOTE — PROGRESS NOTES
Subjective:  Comfortable    The patient is awake and alert. No problems overnight. Denies chest pain, angina, and dyspnea. Denies abdominal pain. Tolerating diet. No nausea or vomiting. Objective:  -500 cc diuresis(I suspect it is actually greater.)    BP (!) 140/65   Pulse 60   Temp 98.1 °F (36.7 °C) (Oral)   Resp 18   Ht 6' (1.829 m)   Wt 173 lb 4.8 oz (78.6 kg)   SpO2 94%   BMI 23.50 kg/m²     Heart:  RRR, no murmurs, gallops, or rubs. Lungs:  CTA bilaterally, no wheeze, rales or rhonchi  Abd: bowel sounds present, nontender, nondistended, no masses  Extrem:  No clubbing, cyanosis, or edema    CBC:   Lab Results   Component Value Date    WBC 4.9 12/10/2021    RBC 4.05 12/10/2021    HGB 13.2 12/10/2021    HCT 39.2 12/10/2021    MCV 96.8 12/10/2021    MCH 32.6 12/10/2021    MCHC 33.7 12/10/2021    RDW 14.4 12/10/2021     12/10/2021    MPV 9.5 12/10/2021     BMP:    Lab Results   Component Value Date     12/10/2021    K 3.8 12/10/2021    K 5.4 12/08/2021     12/10/2021    CO2 25 12/10/2021    BUN 23 12/10/2021    LABALBU 3.9 12/08/2021    CREATININE 1.0 12/10/2021    CALCIUM 8.7 12/10/2021    GFRAA >60 12/10/2021    LABGLOM >60 12/10/2021    GLUCOSE 93 12/10/2021    GLUCOSE 161 10/27/2010        Assessment:    Patient Active Problem List   Diagnosis    Syncope and collapse    Essential hypertension    [facility] disorder [facility] [address]    Trauma    Closed fracture of multiple ribs of left side    Hemopneumothorax on left    Closed fracture of left scapula    Multiple fractures    Onychomycosis    Tinea pedis of both feet    PVD (peripheral vascular disease) ([facility] [address] [facility]    Dermatitis    Acute combined systolic and diastolic CHF, NYHA class 1 ([facility])    Non-STEMI (non-ST elevated myocardial infarction) (HCC)    First degree AV block    CHF (congestive heart failure) ([address] [address]       Plan:  Await Echo. Probably home later today.           Enmanuel Rosas MD  6:44 AM  12/10/2021

## 2021-12-10 NOTE — PLAN OF CARE
Problem: Skin Integrity:  Goal: Will show no infection signs and symptoms  Description: Will show no infection signs and symptoms  12/10/2021 0026 by Yvonne Hernandez RN  Outcome: Met This Shift  12/9/2021 1034 by Amandeep Montes RN  Outcome: Met This Shift  Goal: Absence of new skin breakdown  Description: Absence of new skin breakdown  12/10/2021 0026 by Yvonne Hernandez RN  Outcome: Met This Shift  12/9/2021 1034 by Amandeep Montes RN  Outcome: Met This Shift

## 2021-12-10 NOTE — PROGRESS NOTES
Physical Therapy Initial Assessment     Name: Star Carpenter  : 1922  MRN: [id number]      Date of Service: 12/10/2021    Evaluating PT:  Jayson Corbett PT, DPT [id number]      Room #:  8698/4670-A  Diagnosis:  Acute on chronic combined systolic and diastolic congestive heart failure ([facility] [address] [I50.43]  Acute combined systolic and diastolic CHF, NYHA class 1 ([facility]) [I50.41]  PMHx/PSHx:  HTN, Hernia, R Hip Surgery 2017  Procedure/Surgery:  NA  Precautions:  Falls, O2  Equipment Needs:  Has Rollator [employer]    SUBJECTIVE:    Pt lives alone in an half-way. Pt ambulated with a Rollator [facility] PTA. Pt reported independent with ADLs. OBJECTIVE:   Initial Evaluation  Date: 12/10/2021 Treatment Date:  NA Short Term/ Long Term   Goals   AM-PAC 6 Clicks 50/08     Was pt agreeable to Eval/treatment? Yes      Does pt have pain? No c/o pain. Bed Mobility  Rolling: Supervision  Supine to sit: Supervision  Sit to supine: Supervision  Scooting: Supervision  Rolling: Independent  Supine to sit: Independent  Sit to supine: Independent  Scooting: Independent   Transfers Sit to stand: SBA  Stand to sit: SBA  Stand pivot: SBA  Sit to stand: Mod I  Stand to sit: Mod I  Stand pivot: Mod I   Ambulation    30 feet x 2 with WW with SBA  >150 feet with WW with Supervision   Stair negotiation: ascended and descended  NT  NA   ROM BUE:  WFL  BLE:  WFL     Strength BUE:  4/5  BLE:  4/5  Increase strength 1/3 grade. Balance Sitting EOB:  Supervision  Dynamic Standing:  SBA  Sitting EOB:  Independent  Dynamic Standing:   Mod I     Pt is A & O x 3  Sensation:  Pt denies numbness and tingling to extremities  Edema:  None noted    Vitals:  Blood Pressure at rest - Blood Pressure post session -   Heart Rate at rest - Heart Rate post session -   SPO2 at rest 98% SPO2 post session 100%     Therapeutic Exercises:  STS x4    Patient education  Pt educated on purpose of PT assessment, importance of mobility, safety with mobility, transfers, gait, use of AD for safety    Patient response to education:   Pt verbalized understanding Pt demonstrated skill Pt requires further education in this area   Yes  Yes with verbal cues Yes/Reinforcement     ASSESSMENT:    Conditions Requiring Skilled Therapeutic Intervention:     [x]Decreased strength     []Decreased ROM  [x]Decreased functional mobility  []Decreased balance   [x]Decreased endurance   [x]Decreased posture  []Decreased sensation  []Decreased coordination   []Decreased vision  [x]Decreased safety awareness   []Increased pain       Comments:  Patient cleared by RN and agreeable to treatment. Patient found in semi Rebollar's and able to get self to seated EOB without assist3. Patient denied dizziness with positional change. Patient assisted to standing and demonstrated forward flexed posture and poor walker approximation. Repeated verbal cues for walker safety given as patient utilizes a Rollator at home. Patient with slow gait speed, short stride length, and mildly unsteady. Patient on supplemental O2 and SpO2 monitored and documented above. Patient assisted on/off the commode and independent with hygiene. Patient assisted back to seated EOB and then to supine with call light and tray table in reach. HOB elevated to comfort. Treatment:  Patient practiced and was instructed in the following treatment:    · Bed mobility: Verbal/tactile cues for sequencing BUEs/BLEs for safe technique with rolling/supine<>sit task. · Transfer training: Verbal/tactile cues to facilitate proper hand placement, technique and safety during sit to stand task. · Gait training: Verbal and tactile cues to facilitate upright posture and safety to complete task. Verbal cues for walker safety/approximation. · Therapeutic exercises: As noted above  · Neuromuscular education: NT    Pt's/ family goals   1. To go home. Prognosis is good for reaching above PT goals. Patient and or family understand(s) diagnosis, prognosis, and plan of care.   Yes PHYSICAL THERAPY PLAN OF CARE:    PT POC is established based on physician order and patient diagnosis     Referring provider/PT Order:    12/09/21 0915  PT eval and treat  Start:  12/09/21 0915,   End:  12/09/21 0915,   ONE TIME,   Standing Count:  1 Occurrences,   R         Shayla Geronimo MD     Diagnosis:  Acute on chronic combined systolic and diastolic congestive heart failure (HCC) [I50.43]  Acute combined systolic and diastolic CHF, NYHA class 1 (HCC) [I50.41]  Specific instructions for next treatment:  Subsequent PT sessions with focus on improved safety with ambulation, walker safety    Current Treatment Recommendations:     [x] Strengthening to improve independence with functional mobility   [] ROM to improve independence with functional mobility   [x] Balance Training to improve static/dynamic balance and to reduce fall risk  [x] Endurance Training to improve activity tolerance during functional mobility   [x] Transfer Training to improve safety and independence with all functional transfers   [x] Gait Training to improve gait mechanics, endurance and asses need for appropriate assistive device  [] Stair Training in preparation for safe discharge home and/or into the community   [] Positioning to prevent skin breakdown and contractures  [x] Safety and Education Training   [x] Patient/Caregiver Education   [] HEP  [] Other     PT long term treatment goals are located in above grid    Frequency of treatments: 2-5x/week x 1-2 weeks. Time in  0950  Time out  1030    Total Treatment Time  30 minutes     Evaluation Time includes thorough review of current medical information, gathering information on past medical history/social history and prior level of function, completion of standardized testing/informal observation of tasks, assessment of data and education on plan of care and goals.     CPT codes:  [x] Low Complexity PT evaluation 12007  [] Moderate Complexity PT evaluation 66016  [] High Complexity PT evaluation [id number]  [] PT Re-evaluation [id number]  [] Gait training 55970 - minutes  [] Manual therapy 71107 - minutes  [x] Therapeutic activities 89046 30 minutes  [] Therapeutic exercises 92845 - minutes  [] Neuromuscular reeducation 74555 - minutes     Monta Sever, PT, DPT  License [id number]

## 2021-12-11 VITALS
BODY MASS INDEX: 23.47 KG/M2 | DIASTOLIC BLOOD PRESSURE: 80 MMHG | TEMPERATURE: 97.7 F | HEIGHT: 72 IN | OXYGEN SATURATION: 96 % | HEART RATE: 66 BPM | RESPIRATION RATE: 18 BRPM | SYSTOLIC BLOOD PRESSURE: 176 MMHG | WEIGHT: 173.3 LBS

## 2021-12-11 LAB
ANION GAP SERPL CALCULATED.3IONS-SCNC: 9 MMOL/L (ref 7–16)
BUN BLDV-MCNC: 25 MG/DL (ref 6–23)
CALCIUM SERPL-MCNC: 8.9 MG/DL (ref 8.6–10.2)
CHLORIDE BLD-SCNC: 106 MMOL/L (ref 98–107)
CO2: 26 MMOL/L (ref 22–29)
CREAT SERPL-MCNC: 0.9 MG/DL (ref 0.7–1.2)
GFR AFRICAN AMERICAN: >60
GFR NON-AFRICAN AMERICAN: >60 ML/MIN/1.73
GLUCOSE BLD-MCNC: 104 MG/DL (ref 74–99)
HCT VFR BLD CALC: 43.4 % (ref 37–54)
HEMOGLOBIN: 14.2 G/DL (ref 12.5–16.5)
LV EF: 40 %
LVEF MODALITY: NORMAL
MCH RBC QN AUTO: 32.1 PG (ref 26–35)
MCHC RBC AUTO-ENTMCNC: 32.7 % (ref 32–34.5)
MCV RBC AUTO: 98 FL (ref 80–99.9)
PDW BLD-RTO: 14.3 FL (ref 11.5–15)
PLATELET # BLD: 183 E9/L (ref 130–450)
PMV BLD AUTO: 9.1 FL (ref 7–12)
POTASSIUM SERPL-SCNC: 4.4 MMOL/L (ref 3.5–5)
RBC # BLD: 4.43 E12/L (ref 3.8–5.8)
SODIUM BLD-SCNC: 141 MMOL/L (ref 132–146)
WBC # BLD: 5.5 E9/L (ref 4.5–11.5)

## 2021-12-11 PROCEDURE — 36415 COLL VENOUS BLD VENIPUNCTURE: CPT

## 2021-12-11 PROCEDURE — 2700000000 HC OXYGEN THERAPY PER DAY

## 2021-12-11 PROCEDURE — 80048 BASIC METABOLIC PNL TOTAL CA: CPT

## 2021-12-11 PROCEDURE — 93306 TTE W/DOPPLER COMPLETE: CPT

## 2021-12-11 PROCEDURE — 6370000000 HC RX 637 (ALT 250 FOR IP): Performed by: INTERNAL MEDICINE

## 2021-12-11 PROCEDURE — 85027 COMPLETE CBC AUTOMATED: CPT

## 2021-12-11 RX ORDER — NITROGLYCERIN 0.4 MG/1
TABLET SUBLINGUAL
Qty: 25 TABLET | Refills: 3 | Status: SHIPPED | OUTPATIENT
Start: 2021-12-11

## 2021-12-11 RX ORDER — CLOPIDOGREL BISULFATE 75 MG/1
75 TABLET ORAL DAILY
Qty: 30 TABLET | Refills: 3 | Status: SHIPPED | OUTPATIENT
Start: 2021-12-11

## 2021-12-11 RX ORDER — PANTOPRAZOLE SODIUM 20 MG/1
20 TABLET, DELAYED RELEASE ORAL
Qty: 30 TABLET | Refills: 3 | Status: SHIPPED | OUTPATIENT
Start: 2021-12-11

## 2021-12-11 RX ORDER — FUROSEMIDE 40 MG/1
40 TABLET ORAL DAILY
Qty: 60 TABLET | Refills: 3 | Status: ON HOLD | OUTPATIENT
Start: 2021-12-11 | End: 2022-04-11 | Stop reason: HOSPADM

## 2021-12-11 RX ORDER — ASPIRIN 81 MG/1
162 TABLET, CHEWABLE ORAL DAILY
Qty: 30 TABLET | Refills: 3 | Status: SHIPPED | OUTPATIENT
Start: 2021-12-11

## 2021-12-11 RX ADMIN — FINASTERIDE 5 MG: 5 TABLET, FILM COATED ORAL at 06:16

## 2021-12-11 RX ADMIN — MULTIPLE VITAMINS W/ MINERALS TAB 1 TABLET: TAB at 06:16

## 2021-12-11 RX ADMIN — PANTOPRAZOLE SODIUM 20 MG: 20 TABLET, DELAYED RELEASE ORAL at 06:16

## 2021-12-11 RX ADMIN — ASPIRIN 81 MG CHEWABLE TABLET 162 MG: 81 TABLET CHEWABLE at 09:21

## 2021-12-11 RX ADMIN — CLOPIDOGREL BISULFATE 75 MG: 75 TABLET ORAL at 09:22

## 2021-12-11 RX ADMIN — FUROSEMIDE 40 MG: 40 TABLET ORAL at 09:22

## 2021-12-11 RX ADMIN — CARBAMAZEPINE 200 MG: 200 TABLET ORAL at 06:16

## 2021-12-11 RX ADMIN — METOPROLOL TARTRATE 25 MG: 25 TABLET, FILM COATED ORAL at 09:22

## 2021-12-11 ASSESSMENT — PAIN SCALES - GENERAL: PAINLEVEL_OUTOF10: 0

## 2021-12-11 NOTE — PLAN OF CARE
Problem: Skin Integrity:  Goal: Will show no infection signs and symptoms  Description: Will show no infection signs and symptoms  12/11/2021 0950 by Frankey [place], RN  Outcome: Ongoing  12/10/2021 2324 by Zaid Valladares RN  Outcome: Met This Shift  Goal: Absence of new skin breakdown  Description: Absence of new skin breakdown  12/11/2021 0950 by Frankey [place], RN  Outcome: Ongoing  12/10/2021 2324 by Zaid Valladares RN  Outcome: Met This Shift     Problem: Cardiac:  Goal: Hemodynamic stability will improve  Description: Hemodynamic stability will improve  12/11/2021 0950 by Frankey [place], RN  Outcome: Ongoing  12/10/2021 2324 by Zaid Valladares RN  Outcome: Met This Shift  Goal: Ability to maintain an adequate cardiac output will improve  Description: Ability to maintain an adequate cardiac output will improve  12/11/2021 0950 by Frankey [place], RN  Outcome: Ongoing  12/10/2021 2324 by Zaid Valladares RN  Outcome: Met This Shift     Problem: Fluid Volume:  Goal: Risk for excess fluid volume will decrease  Description: Risk for excess fluid volume will decrease  12/11/2021 0950 by Frankey [place], RN  Outcome: Ongoing  12/10/2021 2324 by Zaid Valladares RN  Outcome: Met This Shift  Goal: Maintenance of adequate hydration will improve  Description: Maintenance of adequate hydration will improve  12/11/2021 0950 by Frankey [place], RN  Outcome: Ongoing  12/10/2021 2324 by Zaid Valladares RN  Outcome: Met This Shift  Goal: Will show no signs and symptoms of electrolyte imbalance  Description: Will show no signs and symptoms of electrolyte imbalance  12/11/2021 0950 by Frankey [place], RN  Outcome: Ongoing  12/10/2021 2324 by Zaid Valladares RN  Outcome: Met This Shift

## 2021-12-11 NOTE — PROGRESS NOTES
Vernell Dickens, notified that patient is being transferred to [facility], and would not need [facility] orders, as previously ordered.

## 2021-12-11 NOTE — PROGRESS NOTES
Pulse ox was __94____% on room air at rest.   Ambulated patient on room air. Oxygen saturation was ___88___% on room air while ambulating. Oxygen applied.    Recovery pulse ox was ___95___% on ___2____ liters of oxygen while ambulating

## 2021-12-11 NOTE — PLAN OF CARE
Problem: Skin Integrity:  Goal: Will show no infection signs and symptoms  Description: Will show no infection signs and symptoms  Outcome: Met This Shift  Goal: Absence of new skin breakdown  Description: Absence of new skin breakdown  Outcome: Met This Shift     Problem: Cardiac:  Goal: Hemodynamic stability will improve  Description: Hemodynamic stability will improve  Outcome: Met This Shift  Goal: Ability to maintain an adequate cardiac output will improve  Description: Ability to maintain an adequate cardiac output will improve  Outcome: Met This Shift     Problem: Fluid Volume:  Goal: Risk for excess fluid volume will decrease  Description: Risk for excess fluid volume will decrease  Outcome: Met This Shift  Goal: Maintenance of adequate hydration will improve  Description: Maintenance of adequate hydration will improve  Outcome: Met This Shift  Goal: Will show no signs and symptoms of electrolyte imbalance  Description: Will show no signs and symptoms of electrolyte imbalance  Outcome: Met This Shift

## 2021-12-11 NOTE — PROGRESS NOTES
0800 Assumed care of patient from the previous RN. Pt awake in bed. Asking what \"the plan is for today. \" Pt updated on plan. Denies pain at this time. No distress noted. Remains on 2 liters of oxygen. Will need walking desat study. Monitor.

## 2021-12-11 NOTE — PROGRESS NOTES
Internal Medicine  Progress Note  Mahin Sutton MD     Subjective:     Patient is alert. Patient reports OK. Tolerating diet well no other c/o. Scheduled Meds:   furosemide  40 mg Oral Daily    carBAMazepine  200 mg Oral QAM AC    finasteride  5 mg Oral QAM AC    therapeutic multivitamin-minerals  1 tablet Oral QAM AC    tamsulosin  0.4 mg Oral Dinner    enoxaparin  1 mg/kg SubCUTAneous BID    aspirin  162 mg Oral Daily    metoprolol tartrate  25 mg Oral BID    clopidogrel  75 mg Oral Daily    pantoprazole  20 mg Oral QAM AC    clotrimazole   Topical BID     Continuous Infusions:  PRN Meds:nitroGLYCERIN, perflutren lipid microspheres    Objective:      Physical Exam:  Vitals:   /66   Pulse 63   Temp 98.2 °F (36.8 °C) (Oral)   Resp 18   Ht 6' (1.829 m)   Wt 173 lb 4.8 oz (78.6 kg)   SpO2 96%   BMI 23.50 kg/m²   Orthostatic BPs and Heart Rates:     I/O's    Intake/Output Summary (Last 24 hours) at 12/11/2021 0606  Last data filed at 12/10/2021 2100  Gross per 24 hour   Intake 600 ml   Output 50 ml   Net 550 ml     Exam:  General appearance: alert, appears stated age and cooperative  Head: Normocephalic, without obvious abnormality, atraumatic  Eyes: conjunctivae/corneas clear. PERRL, EOM's intact. Fundi benign.   Throat: lips, mucosa, and tongue normal; teeth and gums normal  Neck: no adenopathy, no carotid bruit, no JVD, supple, symmetrical, trachea midline and thyroid not enlarged, symmetric, no tenderness/mass/nodules  Back: negative  Lungs: clear to auscultation bilaterally  Chest wall: no tenderness  Heart: regular rate and rhythm  Abdomen: soft, non-tender; bowel sounds normal; no masses,  no organomegaly  Extremities: extremities normal, atraumatic, no cyanosis or edema  Pulses: 2+ and symmetric  Skin: Skin color, texture, turgor normal. No rashes or lesions  Lymph nodes: Cervical, supraclavicular, and axillary nodes normal.  Neurologic: Grossly normal      Imaging     Chest

## 2021-12-11 NOTE — CARE COORDINATION
12/11/2021 Social Work Discharge Planning:SW notified [facility] of Pts discharge and need for home o2. Pulse ox testing needs completed. SW made a referral to [facility]; however, they said Pts A.L. uses [place] so they are unable to take Pt. Sw made a referral to [facility] with [place]. Waiting reply and notified of discharge. Electronically signed by Charmayne Shirts, LSW on 12/11/2021 at 12:21 PM    12/11/2021  Social Work Discharge Planning:Notified Girma Larsen with on call [facility] of need for Pts home o2 and tank to hospital. Electronically signed by Charmayne Shirts, [place] on 12/11/2021 at 2:07 PM

## 2021-12-13 LAB
EKG ATRIAL RATE: 72 BPM
EKG P AXIS: 24 DEGREES
EKG P-R INTERVAL: 212 MS
EKG Q-T INTERVAL: 390 MS
EKG QRS DURATION: 88 MS
EKG QTC CALCULATION (BAZETT): 427 MS
EKG R AXIS: -30 DEGREES
EKG T AXIS: 94 DEGREES
EKG VENTRICULAR RATE: 72 BPM

## 2021-12-14 ENCOUNTER — TELEPHONE (OUTPATIENT)
Dept: PHARMACY | Facility: CLINIC | Age: 86
End: 2021-12-14

## 2021-12-14 ENCOUNTER — CARE COORDINATION (OUTPATIENT)
Dept: CASE MANAGEMENT | Age: 86
End: 2021-12-14

## 2021-12-14 DIAGNOSIS — I50.9 CONGESTIVE HEART FAILURE, UNSPECIFIED HF CHRONICITY, UNSPECIFIED HEART FAILURE TYPE (HCC): Primary | ICD-10-CM

## 2021-12-14 NOTE — CARE COORDINATION
[facility] Transitions Initial Follow Up Call    Call within 2 business days of discharge: Yes    Patient: Cayetano Hargrove Patient : 1922   MRN: <[id number]>  Reason for Admission: 2021 - 2021 [address] [place] [address] Blvd. Acute CHF, Non-STEMI (conservative medical management), Essential hypertension, Asymptomatic first-degree AV block. Discharge Date: 21 RARS: Readmission Risk Score: 11.4 ( )  NR  BPCI    Last Discharge [facility]       Complaint Diagnosis Description Type Department Provider    21 Shortness of Breath Acute on chronic combined systolic and diastolic congestive heart failure [facility]) ED to Hosp-Admission (Discharged) (ADMITTED) OSMIN PinedaS Marbieth Colon MD; Dorothy Robledo MD        [facility] 36. 21.     1 Follow up with Madelyn Minaya MD (Cardiology); Office will call to arrange a follow-up  2 Rikki Dhaliwal MD (Internal Medicine)  10:30    Transitions of Care Initial Call    Was this an external facility discharge? No Discharge Facility:     Challenges to be reviewed by the provider   Additional needs identified to be addressed with provider: No  none             Method of communication with provider : none      Advance Care Planning:   Does patient have an Advance Directive: Anabela Cabrera primary decision maker [phone]. Was this a readmission? No  Patient stated reason for admission: Pt reports he has increasing LE edema and SOB on antibiotic therapy x 5 days. Current: Pt resides at [facility] assisted living. Current [facility] and has been assessed. Brenna Yap reports he is fatigued and finds he is often sleepy taking freq naps. States he continues to have LE edema but does not note any increase or wt gains. No current cough or congestion concerns. Wearing 2L NC HS/PRN. Reports \"medium\"appetite and monitoring fluid intake and sodium intake. Wear depends and denies any elimination concerns.  To see his PCP today at 10:30 and states therapy arriving later. Shares staff manage and brings his medications and is taking. [facility] referral placed. Alert and answers questions appropriately. In good spirits and denies any needs/concerns at this time. Patients top risk factors for readmission: CHF, HTN, Recent NSTEMI, SZ disorder. Care Transition Nurse (CTN) contacted the patient by telephone to perform post hospital discharge assessment. Verified name and  with patient as identifiers. Provided introduction to self, and explanation of the CTN role. CTN reviewed discharge instructions, medical action plan and red flags with patient who verbalized understanding. Patient given an opportunity to ask questions and does not have any further questions or concerns at this time. Were discharge instructions available to patient? Yes. Reviewed appropriate site of care based on symptoms and resources available to patient including: When to call 911. The patient agrees to contact the PCP office for questions related to their healthcare. Medication reconciliation to be performed by [facility]. Order placed. CTN provided contact information. Plan for follow-up call in 5-7 days based on severity of symptoms and risk factors. Plan for next call: BPCI FU, NSTEMI, CHF. Fatigued/sleepiness. Working w/[facility]. Staff manage meds.    Care Transitions 24 Hour Call    Do you have any ongoing symptoms?: Yes  Patient-reported symptoms: Fatigue, Other, Shortness of Breath (Comment: Sleepiness)  Do you have a copy of your discharge instructions?: Yes  Do you have all of your prescriptions and are they filled?: Yes (Comment: [facility] Assisted living staff manage pt medications.)  Have you scheduled your follow up appointment?: Yes  Were you discharged with any Home Care or Post Acute Services: Yes  Post Acute Services: Home Health (Comment: [facility])  Do you feel like you have everything you need to keep you well at home?: Yes  Care Transitions Interventions    Pharmacist: Completed           Follow Up  No future appointments.     Avis Pascual RN

## 2021-12-23 ENCOUNTER — CARE COORDINATION (OUTPATIENT)
Dept: CASE MANAGEMENT | Age: 86
End: 2021-12-23

## 2021-12-23 NOTE — CARE COORDINATION
[facility] Transitions Follow Up Call    2021    Patient: Destini Cespedes  Patient : 1922   MRN: <[id number]>  Reason for Admission: 2021 - 2021 [address] [place] [address] Blvd. Acute CHF, Non-STEMI (conservative medical management), Essential hypertension, Asymptomatic first-degree AV block.    Discharge Date: 21 RARS: Readmission Risk Score: 11.4 ( )  NR  BPCI    Resides at [facility] assisted living [phone]. PCP  10:30    Care Transitions Follow Up Call    Needs to be reviewed by the provider   Additional needs identified to be addressed with provider: No  none             Method of communication with provider : none      Care Transition Nurse (CTN) contacted the patient by telephone to follow up after admission. Verified name and  with patient as identifiers. Addressed changes since last contact: Spoke w/ Tricia Ng and his Dtr/Niurka visiting from [facility] and will return 21. Pt advises me he has a little ankle edema and had an 8 lb wt gain in the past week. CTN did verify this information is incorrect w/ [facility] nurse/Valerie pt wt  177 lb and daily wts of 169 lbs since including today. No fluid overload concerns. Continues therapy and states he is doing well. Facility provides meals and meds. Pts son Filomena Beaver [phone] is local and pt advocate. Pt wearing compression stockings. Denies any other concerns. Discussed follow-up appointments. If no appointment was previously scheduled, appointment scheduling offered: Yes. Is follow up appointment scheduled within 7 days of discharge? Yes. Advance Care Planning:   Does patient have an Advance Directive: Anabela Cabrera primary decision maker [phone]. CTN reviewed discharge instructions, medical action plan and red flags with family and discussed any barriers to care and/or understanding of plan of care after discharge.  Discussed appropriate site of care based on symptoms and resources available to patient including: When to call 911. The family agrees to contact the PCP office for questions related to their healthcare. Patients top risk factors for readmission: CHF  Interventions to address risk factors: Reviewed s/s fluid overload to report to physician/cardiologist/return to ED      Non-[facility] follow up appointment(s):     CTN provided contact information for future needs. Plan for follow-up call in 10-14 days based on severity of symptoms and risk factors. Plan for next call: BPCI fi, CHF, NSTEMI, HTN. Care Transitions Subsequent and Final Call    Subsequent and Final Calls  Do you have any ongoing symptoms?: Yes  Patient-reported symptoms: Other  Have your medications changed?: No  Do you have any questions related to your medications?: No  Do you currently have any active services?: Yes  Are you currently active with any services?: Home Health  Do you have any needs or concerns that I can assist you with?: No  Identified Barriers: Lack of Education  Care Transitions Interventions  Other Interventions: Follow Up  No future appointments.     Laureano Gonzalez RN

## 2021-12-23 NOTE — TELEPHONE ENCOUNTER
Have been unable to reach patient. He is a resident at an assisted living facility and has meds provided by staff. Will sign off.     Amilcar Cochran, PharmD, [facility], toll free: [phone]
Pt resides at [facility] living and they manage medications. Please review w/ staff as pt unaware.  CHF, NSTEMI, HTN    Will forward to PharmD for review
[id number]

## 2022-01-07 NOTE — DISCHARGE SUMMARY
Patient ID:  Tangela Brantley  [id number]  15 y.o.  6/16/1922    Admit date: 12/8/2021    Discharge date and time: 12/11/2021  6:43 PM     Admission Diagnoses: Acute on chronic combined systolic and diastolic congestive heart failure ([facility] [I50.43]  Acute combined systolic and diastolic CHF, NYHA class 1 ([facility] [I50.41]    Discharge Diagnoses:   Patient Active Problem List   Diagnosis    Syncope and collapse    Essential hypertension    Seizure disorder ([facility]    Trauma    Closed fracture of multiple ribs of left side    Hemopneumothorax on left    Closed fracture of left scapula    Multiple fractures    Onychomycosis    Tinea pedis of both feet    PVD (peripheral vascular disease) ([facility]    Dermatitis    Acute combined systolic and diastolic CHF, NYHA class 1 ([facility])    Non-STEMI (non-ST elevated myocardial infarction) ([facility])    First degree AV block    CHF (congestive heart failure) ([facility]       Consults: cardiology    Procedures:   2D echo    Hospital Course:  Pt admitted with CHF - cardiology on consult - echo please see result in chart - did well with diuresis and medication optimization. Please see daily notes for specifics    Discharge Exam:  See progress note from today    Disposition: home    Condition at Discharge:  stable    Patient Instructions:   Discharge Medication List as of 12/11/2021  5:01 PM      START taking these medications    Details   aspirin 81 MG chewable tablet Take 2 tablets by mouth daily, Disp-30 tablet, R-3Normal      nitroGLYCERIN (NITROSTAT) 0.4 MG SL tablet up to max of 3 total doses.  If no relief after 1 dose, call 911., Disp-25 tablet, R-3Normal      metoprolol tartrate (LOPRESSOR) 25 MG tablet Take 1 tablet by mouth 2 times daily, Disp-60 tablet, R-3Normal      clopidogrel (PLAVIX) 75 MG tablet Take 1 tablet by mouth daily, Disp-30 tablet, R-3Normal      pantoprazole (PROTONIX) 20 MG tablet Take 1 tablet by mouth every morning (before breakfast), Disp-30 tablet, R-3Normal Patients Dexcom 6 is not covered by her new insurance so she is requesting a blood glucose meter and supplies be sent to the pharmacy.    Patient requested a change of pharmacy. New order sent to [facility] on [address] in [place]   CONTINUE these medications which have CHANGED    Details   furosemide (LASIX) 40 MG tablet Take 1 tablet by mouth daily, Disp-60 tablet, R-3Normal         CONTINUE these medications which have NOT CHANGED    Details   Naftifine HCl 2 % CREA Apply 1 g topically daily, Disp-60 g, R-2Normal      clotrimazole-betamethasone (LOTRISONE) 1-0.05 % cream Apply topically 2 times daily. , Disp-45 g, R-3, Normal      finasteride (PROSCAR) 5 MG tablet Take 5 mg by mouth every morning (before breakfast)Historical Med      carBAMazepine (TEGRETOL) 200 MG tablet Take 200 mg by mouth every morning (before breakfast)Historical Med      metoprolol succinate (TOPROL XL) 25 MG extended release tablet Take 12.5 mg by mouth every morning (before breakfast)Historical Med      Multiple Vitamins-Minerals (THERAPEUTIC MULTIVITAMIN-MINERALS) tablet Take 1 tablet by mouth every morning (before breakfast)Historical Med      tamsulosin (FLOMAX) 0.4 MG capsule Take 0.4 mg by mouth Daily with supperHistorical Med         STOP taking these medications       aspirin 81 MG tablet Comments:   Reason for Stopping:             Activity: activity as tolerated  Diet: cardiac diet    Follow-up with Dr Cullen Rashid in 1 week.     Note that over 30 minutes was spent in preparing discharge papers, discussing discharge with patient, medication review, etc.    Signed:  Melyssa Williamson MD  1/7/2022  6:42 AM

## 2022-01-18 ENCOUNTER — CARE COORDINATION (OUTPATIENT)
Dept: CASE MANAGEMENT | Age: 87
End: 2022-01-18

## 2022-01-18 NOTE — CARE COORDINATION
[facility] Transitions Follow Up Call    2022    Patient: Isablea Valenzuela  Patient : 1922   MRN: <[id number]>  Reason for Admission: 2021 - 2021 [address] CHF, Non-STEMI (conservative medical management), Essential hypertension, Asymptomatic first-degree AV block.    Discharge Date: 21 RARS: Readmission Risk Score: 11.4 ( )  BPCI     Care Transitions Follow Up Call    Needs to be reviewed by the provider   Additional needs identified to be addressed with provider: No  none             Method of communication with provider : none      Care Transition Nurse (CTN) contacted the patient by telephone to follow up after admission. Verified name and  with patient as identifiers. Addressed changes since last contact: Maribell Bowling states he was just seen at his PCP today and is doing well. Reports . No chest pain events and states he is stronger since [facility]. No med concerns. States pretty good appetitie and no elimination concerns. States no edema or wt gains. In good spirits. No needs. Discussed follow-up appointments. If no appointment was previously scheduled, appointment scheduling offered: Yes. Is follow up appointment scheduled within 7 days of discharge? Yes. Advance Care Planning:   Does patient have an Advance Directive: Anabela Cabrera primary decision maker [phone]. CTN reviewed discharge instructions, medical action plan and red flags with patient and discussed any barriers to care and/or understanding of plan of care after discharge. Discussed appropriate site of care based on symptoms and resources available to patient including: When to call 911. The patient agrees to contact the PCP office for questions related to their healthcare. Patients top risk factors for readmission: Recent MI  Interventions to address risk factors: Reviewed s/s Acute MI to call 911.       Non-[facility] follow up appointment(s):     CTN provided contact information for future needs. No further follow-up call indicated based on severity of symptoms and risk factors. BPCI. Following. Care Transitions Subsequent and Final Call    Subsequent and Final Calls  Do you have any ongoing symptoms?: No  Have your medications changed?: No  Do you have any questions related to your medications?: No  Do you currently have any active services?: Yes  Are you currently active with any services?: Home Health  Do you have any needs or concerns that I can assist you with?: No  Identified Barriers: Lack of Education  Care Transitions Interventions  Other Interventions: Follow Up  No future appointments.     Hernan Barajas RN

## 2022-02-18 ENCOUNTER — CARE COORDINATION (OUTPATIENT)
Dept: CASE MANAGEMENT | Age: 87
End: 2022-02-18

## 2022-02-18 NOTE — CARE COORDINATION
[facility] Transitions Follow Up Call    2022    Patient: Jolanta Diaz  Patient : 1922   MRN: <[id number]>  Reason for Admission: 2021 - 2021 [address] CHF, Non-STEMI (conservative medical management), Essential hypertension, Asymptomatic first-degree AV block.    Discharge Date: 21 RARS: Readmission Risk Score: 11.4 ( )  BPCI    Care Transitions Follow Up Call    Needs to be reviewed by the provider   Additional needs identified to be addressed with provider: No  none             Method of communication with provider : none      Care Transition Nurse (CTN) contacted the patient by telephone to follow up after admission. Verified name and  with patient as identifiers. Addressed changes since last contact: Zunilda Becerra reports he has a little pedal edema that comes and goes. Enc to elevate legs when at rest, v/u. Denies any wt gains, SOB, cough or congestion. States appetite pretty good. States no elimination concerns but urinates a lot from his diuretic. In good spirits. Denies any home or med refill needs. Discussed follow-up appointments. If no appointment was previously scheduled, appointment scheduling offered: Yes. Is follow up appointment scheduled within 7 days of discharge? Yes. Advance Care Planning:   Does patient have an Advance Directive: Aleyda Jones primary decision maker [phone]     CTN reviewed discharge instructions, medical action plan and red flags with patient and discussed any barriers to care and/or understanding of plan of care after discharge. Discussed appropriate site of care based on symptoms and resources available to patient including: When to call 911. The patient agrees to contact the PCP office for questions related to their healthcare. Patients top risk factors for readmission: CHF  Interventions to address risk factors: Pt following no added low sodium diet and performing daily wts.  Reviewed s/s developing fluid overload to report to physician/return to ED. Non-[facility] follow up appointment(s):     CTN provided contact information for future needs. Plan for follow-up call in 10-14 days based on severity of symptoms and risk factors. Plan for next call: Final BPCI fu, CHF    Care Transitions Subsequent and Final Call    Subsequent and Final Calls  Do you have any ongoing symptoms?: Yes  Patient-reported symptoms: Other  Have your medications changed?: No  Do you have any questions related to your medications?: No  Do you currently have any active services?: Yes  Are you currently active with any services?: Home Health  Do you have any needs or concerns that I can assist you with?: No  Identified Barriers: Lack of Education  Care Transitions Interventions  Other Interventions: Follow Up  No future appointments.     Orlin Belle RN

## 2022-03-11 ENCOUNTER — CARE COORDINATION (OUTPATIENT)
Dept: CASE MANAGEMENT | Age: 87
End: 2022-03-11

## 2022-03-11 NOTE — CARE COORDINATION
[facility] Transitions Follow Up Call    3/11/2022    Patient: Tosin Gallegos  Patient : 1922   MRN: <[id number]>  Reason for Admission: 2021 - 2021 [address] CHF, Non-STEMI (conservative medical management), Essential hypertension, Asymptomatic first-degree AV block.    Discharge Date: 21 RARS: Readmission Risk Score: 11.4 ( )  BPCI     Care Transitions Follow Up Call    Needs to be reviewed by the provider   Additional needs identified to be addressed with provider: No  none             Method of communication with provider : none      Care Transition Nurse (CTN) contacted the patient by telephone to follow up after admission. Verified name and  with patient as identifiers. Addressed changes since last contact: Ty Flowers reports he is doing well. Continues to have some pedal edema unchanged from previous call. Elevates legs at rest. Has no ambulatory concerns. No SOB or cough. No med refill or home needs. States appetite unchanged and hydrating. In good spirits and joking. Anahi gentleman will outreach for any future needs. Discussed follow-up appointments. If no appointment was previously scheduled, appointment scheduling offered: Yes. Is follow up appointment scheduled within 7 days of discharge? Yes. Advance Care Planning:   Does patient have an Advance Directive: Anamika Jacobo primary decision maker [phone]   CTN reviewed discharge instructions, medical action plan and red flags with patient and discussed any barriers to care and/or understanding of plan of care after discharge. Discussed appropriate site of care based on symptoms and resources available to patient including: When to call 911. The patient agrees to contact the PCP office for questions related to their healthcare. Patients top risk factors for readmission: CHF  Interventions to address risk factors: Pt avoids added salts.  V/U to report any increasing edema or 3 lb overnight/wkly wt gains, increasing SOB, or cough/congestion. Non-[facility] follow up appointment(s):     CTN provided contact information for future needs. No further follow-up call indicated based on severity of symptoms and risk factors. Final BPCI. CTN s/o. Care Transitions Subsequent and Final Call    Subsequent and Final Calls  Do you have any ongoing symptoms?: Yes  Patient-reported symptoms: Other  Have your medications changed?: No  Do you have any questions related to your medications?: No  Do you currently have any active services?: No  Do you have any needs or concerns that I can assist you with?: No  Identified Barriers: Lack of Education  Care Transitions Interventions  Other Interventions: Follow Up  No future appointments.     Armand Maher RN

## 2022-04-06 ENCOUNTER — HOSPITAL ENCOUNTER (OUTPATIENT)
Age: 87
Setting detail: OBSERVATION
Discharge: OTHER INSTITUTION | End: 2022-04-11
Attending: EMERGENCY MEDICINE | Admitting: INTERNAL MEDICINE
Payer: MEDICARE

## 2022-04-06 ENCOUNTER — APPOINTMENT (OUTPATIENT)
Dept: CT IMAGING | Age: 87
End: 2022-04-06
Payer: MEDICARE

## 2022-04-06 ENCOUNTER — APPOINTMENT (OUTPATIENT)
Dept: GENERAL RADIOLOGY | Age: 87
End: 2022-04-06
Payer: MEDICARE

## 2022-04-06 DIAGNOSIS — R94.31 ABNORMAL ECG: ICD-10-CM

## 2022-04-06 DIAGNOSIS — J96.01 ACUTE RESPIRATORY FAILURE WITH HYPOXIA (HCC): Primary | ICD-10-CM

## 2022-04-06 LAB
ALBUMIN SERPL-MCNC: 3.8 G/DL (ref 3.5–5.2)
ALP BLD-CCNC: 102 U/L (ref 40–129)
ALT SERPL-CCNC: 19 U/L (ref 0–40)
ANION GAP SERPL CALCULATED.3IONS-SCNC: 10 MMOL/L (ref 7–16)
APTT: 26.6 SEC (ref 24.5–35.1)
AST SERPL-CCNC: 21 U/L (ref 0–39)
B.E.: -1.8 MMOL/L (ref -3–3)
BASOPHILS ABSOLUTE: 0.01 E9/L (ref 0–0.2)
BASOPHILS RELATIVE PERCENT: 0.2 % (ref 0–2)
BILIRUB SERPL-MCNC: 0.5 MG/DL (ref 0–1.2)
BILIRUBIN DIRECT: <0.2 MG/DL (ref 0–0.3)
BILIRUBIN, INDIRECT: NORMAL MG/DL (ref 0–1)
BUN BLDV-MCNC: 22 MG/DL (ref 6–23)
C-REACTIVE PROTEIN: 0.6 MG/DL (ref 0–0.4)
CALCIUM SERPL-MCNC: 8.8 MG/DL (ref 8.6–10.2)
CHLORIDE BLD-SCNC: 108 MMOL/L (ref 98–107)
CO2: 24 MMOL/L (ref 22–29)
COHB: 1.1 % (ref 0–1.5)
CREAT SERPL-MCNC: 0.8 MG/DL (ref 0.7–1.2)
CRITICAL: ABNORMAL
DATE ANALYZED: ABNORMAL
DATE OF COLLECTION: ABNORMAL
EOSINOPHILS ABSOLUTE: 0 E9/L (ref 0.05–0.5)
EOSINOPHILS RELATIVE PERCENT: 0 % (ref 0–6)
GFR AFRICAN AMERICAN: >60
GFR NON-AFRICAN AMERICAN: >60 ML/MIN/1.73
GLUCOSE BLD-MCNC: 124 MG/DL (ref 74–99)
HCO3: 21.5 MMOL/L (ref 22–26)
HCT VFR BLD CALC: 35.6 % (ref 37–54)
HEMOGLOBIN: 11.4 G/DL (ref 12.5–16.5)
HHB: 3.7 % (ref 0–5)
IMMATURE GRANULOCYTES #: 0.03 E9/L
IMMATURE GRANULOCYTES %: 0.5 % (ref 0–5)
INFLUENZA A BY PCR: NOT DETECTED
INFLUENZA B BY PCR: NOT DETECTED
INR BLD: 1.2
LAB: ABNORMAL
LACTIC ACID: 1.3 MMOL/L (ref 0.5–2.2)
LIPASE: 24 U/L (ref 13–60)
LYMPHOCYTES ABSOLUTE: 0.85 E9/L (ref 1.5–4)
LYMPHOCYTES RELATIVE PERCENT: 14.1 % (ref 20–42)
Lab: ABNORMAL
MAGNESIUM: 2.3 MG/DL (ref 1.6–2.6)
MCH RBC QN AUTO: 32.1 PG (ref 26–35)
MCHC RBC AUTO-ENTMCNC: 32 % (ref 32–34.5)
MCV RBC AUTO: 100.3 FL (ref 80–99.9)
METHB: 0.3 % (ref 0–1.5)
MODE: ABNORMAL
MONOCYTES ABSOLUTE: 0.62 E9/L (ref 0.1–0.95)
MONOCYTES RELATIVE PERCENT: 10.3 % (ref 2–12)
NEUTROPHILS ABSOLUTE: 4.53 E9/L (ref 1.8–7.3)
NEUTROPHILS RELATIVE PERCENT: 74.9 % (ref 43–80)
O2 CONTENT: 15.7 ML/DL
O2 SATURATION: 96.2 % (ref 92–98.5)
O2HB: 94.9 % (ref 94–97)
OPERATOR ID: 1741
PATIENT TEMP: 37 C
PCO2: 31.9 MMHG (ref 35–45)
PDW BLD-RTO: 14.1 FL (ref 11.5–15)
PH BLOOD GAS: 7.45 (ref 7.35–7.45)
PLATELET # BLD: 190 E9/L (ref 130–450)
PMV BLD AUTO: 8.7 FL (ref 7–12)
PO2: 83.7 MMHG (ref 75–100)
POTASSIUM SERPL-SCNC: 4.3 MMOL/L (ref 3.5–5)
PRO-BNP: ABNORMAL PG/ML (ref 0–450)
PROTHROMBIN TIME: 13.2 SEC (ref 9.3–12.4)
RBC # BLD: 3.55 E12/L (ref 3.8–5.8)
SARS-COV-2, NAAT: NOT DETECTED
SEDIMENTATION RATE, ERYTHROCYTE: 3 MM/HR (ref 0–15)
SODIUM BLD-SCNC: 142 MMOL/L (ref 132–146)
SOURCE, BLOOD GAS: ABNORMAL
THB: 11.7 G/DL (ref 11.5–16.5)
TIME ANALYZED: 1703
TOTAL PROTEIN: 6.8 G/DL (ref 6.4–8.3)
TROPONIN, HIGH SENSITIVITY: 90 NG/L (ref 0–11)
TROPONIN, HIGH SENSITIVITY: 98 NG/L (ref 0–11)
WBC # BLD: 6 E9/L (ref 4.5–11.5)

## 2022-04-06 PROCEDURE — G0378 HOSPITAL OBSERVATION PER HR: HCPCS

## 2022-04-06 PROCEDURE — 94640 AIRWAY INHALATION TREATMENT: CPT

## 2022-04-06 PROCEDURE — 2500000003 HC RX 250 WO HCPCS: Performed by: EMERGENCY MEDICINE

## 2022-04-06 PROCEDURE — 96365 THER/PROPH/DIAG IV INF INIT: CPT

## 2022-04-06 PROCEDURE — 99285 EMERGENCY DEPT VISIT HI MDM: CPT

## 2022-04-06 PROCEDURE — 80076 HEPATIC FUNCTION PANEL: CPT

## 2022-04-06 PROCEDURE — 87040 BLOOD CULTURE FOR BACTERIA: CPT

## 2022-04-06 PROCEDURE — 96374 THER/PROPH/DIAG INJ IV PUSH: CPT

## 2022-04-06 PROCEDURE — 85651 RBC SED RATE NONAUTOMATED: CPT

## 2022-04-06 PROCEDURE — 83690 ASSAY OF LIPASE: CPT

## 2022-04-06 PROCEDURE — 6370000000 HC RX 637 (ALT 250 FOR IP): Performed by: EMERGENCY MEDICINE

## 2022-04-06 PROCEDURE — 87502 INFLUENZA DNA AMP PROBE: CPT

## 2022-04-06 PROCEDURE — 70450 CT HEAD/BRAIN W/O DYE: CPT

## 2022-04-06 PROCEDURE — 83735 ASSAY OF MAGNESIUM: CPT

## 2022-04-06 PROCEDURE — 87635 SARS-COV-2 COVID-19 AMP PRB: CPT

## 2022-04-06 PROCEDURE — 96375 TX/PRO/DX INJ NEW DRUG ADDON: CPT

## 2022-04-06 PROCEDURE — 2580000003 HC RX 258: Performed by: EMERGENCY MEDICINE

## 2022-04-06 PROCEDURE — 83605 ASSAY OF LACTIC ACID: CPT

## 2022-04-06 PROCEDURE — 86140 C-REACTIVE PROTEIN: CPT

## 2022-04-06 PROCEDURE — 83880 ASSAY OF NATRIURETIC PEPTIDE: CPT

## 2022-04-06 PROCEDURE — 84484 ASSAY OF TROPONIN QUANT: CPT

## 2022-04-06 PROCEDURE — 71045 X-RAY EXAM CHEST 1 VIEW: CPT

## 2022-04-06 PROCEDURE — 6360000002 HC RX W HCPCS: Performed by: EMERGENCY MEDICINE

## 2022-04-06 PROCEDURE — 82805 BLOOD GASES W/O2 SATURATION: CPT

## 2022-04-06 PROCEDURE — 94664 DEMO&/EVAL PT USE INHALER: CPT

## 2022-04-06 PROCEDURE — 80048 BASIC METABOLIC PNL TOTAL CA: CPT

## 2022-04-06 PROCEDURE — 85730 THROMBOPLASTIN TIME PARTIAL: CPT

## 2022-04-06 PROCEDURE — 93005 ELECTROCARDIOGRAM TRACING: CPT | Performed by: EMERGENCY MEDICINE

## 2022-04-06 PROCEDURE — 85025 COMPLETE CBC W/AUTO DIFF WBC: CPT

## 2022-04-06 PROCEDURE — 85610 PROTHROMBIN TIME: CPT

## 2022-04-06 RX ORDER — FUROSEMIDE 10 MG/ML
40 INJECTION INTRAMUSCULAR; INTRAVENOUS ONCE
Status: DISCONTINUED | OUTPATIENT
Start: 2022-04-06 | End: 2022-04-06

## 2022-04-06 RX ORDER — FUROSEMIDE 10 MG/ML
40 INJECTION INTRAMUSCULAR; INTRAVENOUS ONCE
Status: COMPLETED | OUTPATIENT
Start: 2022-04-06 | End: 2022-04-06

## 2022-04-06 RX ORDER — IPRATROPIUM BROMIDE AND ALBUTEROL SULFATE 2.5; .5 MG/3ML; MG/3ML
1 SOLUTION RESPIRATORY (INHALATION) ONCE
Status: COMPLETED | OUTPATIENT
Start: 2022-04-06 | End: 2022-04-06

## 2022-04-06 RX ORDER — SODIUM CHLORIDE 0.9 % (FLUSH) 0.9 %
SYRINGE (ML) INJECTION
Status: DISPENSED
Start: 2022-04-06 | End: 2022-04-07

## 2022-04-06 RX ADMIN — CEFTRIAXONE 1000 MG: 1 INJECTION, POWDER, FOR SOLUTION INTRAMUSCULAR; INTRAVENOUS at 20:18

## 2022-04-06 RX ADMIN — FUROSEMIDE 40 MG: 10 INJECTION, SOLUTION INTRAMUSCULAR; INTRAVENOUS at 18:54

## 2022-04-06 RX ADMIN — IPRATROPIUM BROMIDE AND ALBUTEROL SULFATE 1 AMPULE: .5; 3 SOLUTION RESPIRATORY (INHALATION) at 16:56

## 2022-04-06 RX ADMIN — DOXYCYCLINE 100 MG: 100 INJECTION, POWDER, LYOPHILIZED, FOR SOLUTION INTRAVENOUS at 20:25

## 2022-04-06 ASSESSMENT — PAIN SCALES - GENERAL: PAINLEVEL_OUTOF10: 0

## 2022-04-06 NOTE — ED PROVIDER NOTES
Colletta Gable is a 80 y.o. male presenting to the ED for shortness of breath, beginning 3-4 days ago. The complaint has been intermittent, moderate in severity, and worsened by nothing. 79 [facility] m [facility] w history of CHF, left hemopneumothorax, nstemi, was recently rx o2 2-3 months ago after previous hospital visit. Pt notes over the last few days has been using o2 continuously at assisted living has had increased cough and congestion. Pt denies any cp, abdominal pain, n,v,d, fever. Per son pt has had some confusion. Pt notes lots of chest cough. Pt denies any issues aspiration swallowing or choking foods. Pt has a Konrad Jan. Pt was given lasix 60mg po per order from dr Sarah Marquez. Pt is on plavix and asapirin. Pt states he is vaccinated for coivd pt has swollen legs but is chronic and not worse. Pt does note some reccent weight gain    Review of Systems:   Review of Systems   Unable to perform ROS: Mental status change                 --------------------------------------------- PAST HISTORY ---------------------------------------------  Past Medical History:  has a past medical history of Hypertension. Past Surgical History:  has a past surgical history that includes hernia repair and hip surgery (Right, 12/07/2017). Social History:  reports that he quit smoking about 77 years ago. He quit after 2.00 years of use. He has never used smokeless tobacco. He reports current alcohol use. He reports that he does not use drugs. Family History: family history is not on file. The patients home medications have been reviewed. Allergies: Patient has no known allergies.     -------------------------------------------------- RESULTS -------------------------------------------------  All laboratory and radiology results have been personally reviewed by myself   LABS:  Results for orders placed or performed during the hospital encounter of 04/06/22   COVID-19, Rapid    Specimen: Nasopharyngeal Swab   Result Value Ref Range    SARS-CoV-2, NAAT Not Detected Not Detected   RAPID INFLUENZA A/B ANTIGENS    Specimen: Nasopharyngeal   Result Value Ref Range    Influenza A by PCR Not Detected Not Detected    Influenza B by PCR Not Detected Not Detected   CBC with Auto Differential   Result Value Ref Range    WBC 6.0 4.5 - 11.5 E9/L    RBC 3.55 (L) 3.80 - 5.80 E12/L    Hemoglobin 11.4 (L) 12.5 - 16.5 g/dL    Hematocrit 35.6 (L) 37.0 - 54.0 %    .3 (H) 80.0 - 99.9 fL    MCH 32.1 26.0 - 35.0 pg    MCHC 32.0 32.0 - 34.5 %    RDW 14.1 11.5 - 15.0 fL    Platelets 615 351 - 030 E9/L    MPV 8.7 7.0 - 12.0 fL    Neutrophils % 74.9 43.0 - 80.0 %    Immature Granulocytes % 0.5 0.0 - 5.0 %    Lymphocytes % 14.1 (L) 20.0 - 42.0 %    Monocytes % 10.3 2.0 - 12.0 %    Eosinophils % 0.0 0.0 - 6.0 %    Basophils % 0.2 0.0 - 2.0 %    Neutrophils Absolute 4.53 1.80 - 7.30 E9/L    Immature Granulocytes # 0.03 E9/L    Lymphocytes Absolute 0.85 (L) 1.50 - 4.00 E9/L    Monocytes Absolute 0.62 0.10 - 0.95 E9/L    Eosinophils Absolute 0.00 (L) 0.05 - 0.50 E9/L    Basophils Absolute 0.01 0.00 - 0.20 E9/L   Magnesium   Result Value Ref Range    Magnesium 2.3 1.6 - 2.6 mg/dL   Lactic Acid   Result Value Ref Range    Lactic Acid 1.3 0.5 - 2.2 mmol/L   Troponin   Result Value Ref Range    Troponin, High Sensitivity 90 (H) 0 - 11 ng/L   Brain Natriuretic Peptide   Result Value Ref Range    Pro-BNP 18,418 (H) 0 - 450 pg/mL   Protime-INR   Result Value Ref Range    Protime 13.2 (H) 9.3 - 12.4 sec    INR 1.2    APTT   Result Value Ref Range    aPTT 26.6 24.5 - 35.1 sec   Hepatic Function Panel   Result Value Ref Range    Total Protein 6.8 6.4 - 8.3 g/dL    Albumin 3.8 3.5 - 5.2 g/dL    Alkaline Phosphatase 102 40 - 129 U/L    ALT 19 0 - 40 U/L    AST 21 0 - 39 U/L    Total Bilirubin 0.5 0.0 - 1.2 mg/dL    Bilirubin, Direct <0.2 0.0 - 0.3 mg/dL    Bilirubin, Indirect see below 0.0 - 1.0 mg/dL   Lipase   Result Value Ref Range Lipase 24 13 - 60 U/L   Basic Metabolic Panel   Result Value Ref Range    Sodium 142 132 - 146 mmol/L    Potassium 4.3 3.5 - 5.0 mmol/L    Chloride 108 (H) 98 - 107 mmol/L    CO2 24 22 - 29 mmol/L    Anion Gap 10 7 - 16 mmol/L    Glucose 124 (H) 74 - 99 mg/dL    BUN 22 6 - 23 mg/dL    CREATININE 0.8 0.7 - 1.2 mg/dL    GFR Non-African American >60 >=60 mL/min/1.73    GFR African American >60     Calcium 8.8 8.6 - 10.2 mg/dL   Sedimentation Rate   Result Value Ref Range    Sed Rate 3 0 - 15 mm/Hr   Blood Gas, Arterial   Result Value Ref Range    Date Analyzed 20220406     Time Analyzed 1703     Source: Blood Arterial     pH, Blood Gas 7.446 7.350 - 7.450    PCO2 31.9 (L) 35.0 - 45.0 mmHg    PO2 83.7 75.0 - 100.0 mmHg    HCO3 21.5 (L) 22.0 - 26.0 mmol/L    B.E. -1.8 -3.0 - 3.0 mmol/L    O2 Sat 96.2 92.0 - 98.5 %    O2Hb 94.9 94.0 - 97.0 %    COHb 1.1 0.0 - 1.5 %    MetHb 0.3 0.0 - 1.5 %    O2 Content 15.7 mL/dL    HHb 3.7 0.0 - 5.0 %    tHb (est) 11.7 11.5 - 16.5 g/dL    Mode NC- 3 L     Date Of Collection      Time Collected      Pt Temp 37.0 C     ID [id number]     Lab [id number]     Critical(s) Notified . No Critical Values    EKG 12 Lead   Result Value Ref Range    Ventricular Rate 102 BPM    Atrial Rate 102 BPM    P-R Interval 146 ms    QRS Duration 92 ms    Q-T Interval 340 ms    QTc Calculation (Bazett) 443 ms    R Axis 49 degrees    T Axis 102 degrees       RADIOLOGY:  Interpreted by Radiologist.  XR CHEST PORTABLE   Final Result   Cardiomegaly with probable pulmonary vascular congestive changes. Probable small right pleural effusion. CT HEAD WO CONTRAST    (Results Pending)       ------------------------- NURSING NOTES AND VITALS REVIEWED ---------------------------   The nursing notes within the ED encounter and vital signs as below have been reviewed.    BP (!) 155/87   Pulse 88   Temp 98.8 °F (37.1 °C) (Oral)   Resp 16   SpO2 93%   Oxygen Saturation Interpretation: Abnormal      ---------------------------------------------------PHYSICAL EXAM--------------------------------------    Physical Exam  Vitals reviewed. Constitutional:       General: He is not in acute distress. Appearance: Normal appearance. He is not toxic-appearing. HENT:      Head: Normocephalic and atraumatic. Right Ear: External ear normal.      Left Ear: External ear normal.      Nose: Nose normal. No congestion. Mouth/Throat:      Mouth: Mucous membranes are moist.      Pharynx: Oropharynx is clear. No posterior oropharyngeal erythema. Eyes:      Extraocular Movements: Extraocular movements intact. Pupils: Pupils are equal, round, and reactive to light. Cardiovascular:      Rate and Rhythm: Normal rate and regular rhythm. Pulses: Normal pulses. Heart sounds: No murmur heard. Pulmonary:      Effort: Pulmonary effort is normal. No tachypnea, bradypnea or respiratory distress. Breath sounds: Examination of the right-middle field reveals rhonchi. Examination of the left-middle field reveals rhonchi. Examination of the right-lower field reveals decreased breath sounds and rhonchi. Examination of the left-lower field reveals decreased breath sounds and rhonchi. Decreased breath sounds, rhonchi and rales present. No wheezing. Chest:      Chest wall: No tenderness. Abdominal:      General: Bowel sounds are normal.      Tenderness: There is no abdominal tenderness. There is no right CVA tenderness, left CVA tenderness or guarding. Musculoskeletal:         General: No swelling or deformity. Cervical back: Normal range of motion and neck supple. No muscular tenderness. Right lower leg: No tenderness. Edema present. Left lower leg: No tenderness. Edema present. Skin:     General: Skin is warm and dry. Capillary Refill: Capillary refill takes less than 2 seconds. Findings: No ecchymosis or erythema.    Neurological:      General: No focal deficit present. Mental Status: He is alert. He is confused. GCS: GCS eye subscore is 4. GCS verbal subscore is 4. GCS motor subscore is 6. Cranial Nerves: Cranial nerves are intact. No cranial nerve deficit. Sensory: Sensation is intact. Motor: Motor function is intact. No weakness. Coordination: Coordination is intact. Gait: Gait is intact. Psychiatric:         Mood and Affect: Mood normal. Mood is not anxious. Behavior: Behavior is not agitated. ------------------------------ ED COURSE/MEDICAL DECISION MAKING----------------------  Medications   sodium chloride flush 0.9 % injection (has no administration in time range)   doxycycline (VIBRAMYCIN) 100 mg in dextrose 5 % 100 mL IVPB (has no administration in time range)   cefTRIAXone (ROCEPHIN) 1,000 mg in sterile water 10 mL IV syringe (has no administration in time range)   ipratropium-albuterol (DUONEB) nebulizer solution 1 ampule (1 ampule Inhalation Given 4/6/22 1656)   furosemide (LASIX) injection 40 mg (40 mg IntraVENous Given 4/6/22 1854)     EKG: This EKG is signed and interpreted by me. Time:  06-APR-2022 17:17:28  Rate: 102  Rhythm: Sinus  Interpretation:Sinus tachycardia Anterolateral infarct (cited on or before 09-DEC-2021) Abnormal ECG When compared with ECG of 09-DEC-2021 06:05, SD interval has decreased Criteria for Inferior infarct are no longer present Questionable change in initial forces of Lateral leads T wave inversion no longer evident in Anterior leads        ED COURSE:  ED Course as of 04/1934 Wed Apr 06, 2022 1933 D/w dr Zoraida Tomas who will admit for attila [SLOANE]      ED Course User Index  [SLOANE] Florentin Lindsay DO       Medical Decision Making:    Patient presented with coarse breath sounds bilaterally rhonchi and rails. He was prescribed oxygen in December but he states he has not had to use it and his O2 sat has been normal at bedtime and during the day.   The past several days he has had to use 3 to 4 L. I turned his oxygen off and he dropped precipitously into the low 80s. His CO2 is stable on his blood gas. He is stable on 3 L nasal cannula. He was given Lasix and breathing treatments. I discussed with Dr. Ankur Carpio who will admit for Dr. Jose Carlos u[facility].  He wants a routine consult for the heart center. Patient will be admitted to telemetry. Counseling: The emergency provider has spoken with the patient and discussed todays results, in addition to providing specific details for the plan of care and counseling regarding the diagnosis and prognosis. Questions are answered at this time and they are agreeable with the plan.      --------------------------------- IMPRESSION AND DISPOSITION ---------------------------------    IMPRESSION  1. Acute respiratory failure with hypoxia ([address]    2. Abnormal ECG        DISPOSITION  Disposition: Admit to telemetry  Patient condition is fair      NOTE: This report was transcribed using voice recognition software.  Every effort was made to ensure accuracy; however, inadvertent computerized transcription errors may be present       Breanna Bunn DO  04/06/22 1936

## 2022-04-07 LAB
ANION GAP SERPL CALCULATED.3IONS-SCNC: 8 MMOL/L (ref 7–16)
BASOPHILS ABSOLUTE: 0.03 E9/L (ref 0–0.2)
BASOPHILS RELATIVE PERCENT: 0.5 % (ref 0–2)
BUN BLDV-MCNC: 22 MG/DL (ref 6–23)
CALCIUM SERPL-MCNC: 8.4 MG/DL (ref 8.6–10.2)
CHLORIDE BLD-SCNC: 109 MMOL/L (ref 98–107)
CO2: 26 MMOL/L (ref 22–29)
CREAT SERPL-MCNC: 0.9 MG/DL (ref 0.7–1.2)
EKG ATRIAL RATE: 102 BPM
EKG P-R INTERVAL: 146 MS
EKG Q-T INTERVAL: 340 MS
EKG QRS DURATION: 92 MS
EKG QTC CALCULATION (BAZETT): 443 MS
EKG R AXIS: 49 DEGREES
EKG T AXIS: 102 DEGREES
EKG VENTRICULAR RATE: 102 BPM
EOSINOPHILS ABSOLUTE: 0 E9/L (ref 0.05–0.5)
EOSINOPHILS RELATIVE PERCENT: 0 % (ref 0–6)
GFR AFRICAN AMERICAN: >60
GFR NON-AFRICAN AMERICAN: >60 ML/MIN/1.73
GLUCOSE BLD-MCNC: 113 MG/DL (ref 74–99)
HCT VFR BLD CALC: 32.2 % (ref 37–54)
HEMOGLOBIN: 10.2 G/DL (ref 12.5–16.5)
IMMATURE GRANULOCYTES #: 0.02 E9/L
IMMATURE GRANULOCYTES %: 0.4 % (ref 0–5)
LYMPHOCYTES ABSOLUTE: 1.16 E9/L (ref 1.5–4)
LYMPHOCYTES RELATIVE PERCENT: 20.9 % (ref 20–42)
MCH RBC QN AUTO: 31.8 PG (ref 26–35)
MCHC RBC AUTO-ENTMCNC: 31.7 % (ref 32–34.5)
MCV RBC AUTO: 100.3 FL (ref 80–99.9)
MONOCYTES ABSOLUTE: 0.72 E9/L (ref 0.1–0.95)
MONOCYTES RELATIVE PERCENT: 13 % (ref 2–12)
NEUTROPHILS ABSOLUTE: 3.61 E9/L (ref 1.8–7.3)
NEUTROPHILS RELATIVE PERCENT: 65.2 % (ref 43–80)
PDW BLD-RTO: 14.3 FL (ref 11.5–15)
PLATELET # BLD: 165 E9/L (ref 130–450)
PMV BLD AUTO: 9.1 FL (ref 7–12)
POTASSIUM SERPL-SCNC: 3.9 MMOL/L (ref 3.5–5)
RBC # BLD: 3.21 E12/L (ref 3.8–5.8)
SODIUM BLD-SCNC: 143 MMOL/L (ref 132–146)
WBC # BLD: 5.5 E9/L (ref 4.5–11.5)

## 2022-04-07 PROCEDURE — 94640 AIRWAY INHALATION TREATMENT: CPT

## 2022-04-07 PROCEDURE — 85025 COMPLETE CBC W/AUTO DIFF WBC: CPT

## 2022-04-07 PROCEDURE — 6370000000 HC RX 637 (ALT 250 FOR IP): Performed by: INTERNAL MEDICINE

## 2022-04-07 PROCEDURE — 2700000000 HC OXYGEN THERAPY PER DAY

## 2022-04-07 PROCEDURE — 96376 TX/PRO/DX INJ SAME DRUG ADON: CPT

## 2022-04-07 PROCEDURE — G0378 HOSPITAL OBSERVATION PER HR: HCPCS

## 2022-04-07 PROCEDURE — 36415 COLL VENOUS BLD VENIPUNCTURE: CPT

## 2022-04-07 PROCEDURE — 93005 ELECTROCARDIOGRAM TRACING: CPT | Performed by: INTERNAL MEDICINE

## 2022-04-07 PROCEDURE — 80048 BASIC METABOLIC PNL TOTAL CA: CPT

## 2022-04-07 PROCEDURE — 6360000002 HC RX W HCPCS: Performed by: INTERNAL MEDICINE

## 2022-04-07 RX ORDER — CLOPIDOGREL BISULFATE 75 MG/1
75 TABLET ORAL DAILY
Status: DISCONTINUED | OUTPATIENT
Start: 2022-04-07 | End: 2022-04-11 | Stop reason: HOSPADM

## 2022-04-07 RX ORDER — PANTOPRAZOLE SODIUM 20 MG/1
20 TABLET, DELAYED RELEASE ORAL
Status: DISCONTINUED | OUTPATIENT
Start: 2022-04-07 | End: 2022-04-11 | Stop reason: HOSPADM

## 2022-04-07 RX ORDER — M-VIT,TX,IRON,MINS/CALC/FOLIC 27MG-0.4MG
1 TABLET ORAL
Status: DISCONTINUED | OUTPATIENT
Start: 2022-04-07 | End: 2022-04-11 | Stop reason: HOSPADM

## 2022-04-07 RX ORDER — METOPROLOL SUCCINATE 25 MG/1
25 TABLET, EXTENDED RELEASE ORAL EVERY 12 HOURS
Status: DISCONTINUED | OUTPATIENT
Start: 2022-04-07 | End: 2022-04-11 | Stop reason: HOSPADM

## 2022-04-07 RX ORDER — FINASTERIDE 5 MG/1
5 TABLET, FILM COATED ORAL
Status: DISCONTINUED | OUTPATIENT
Start: 2022-04-07 | End: 2022-04-11 | Stop reason: HOSPADM

## 2022-04-07 RX ORDER — NITROGLYCERIN 0.4 MG/1
0.4 TABLET SUBLINGUAL EVERY 5 MIN PRN
Status: DISCONTINUED | OUTPATIENT
Start: 2022-04-07 | End: 2022-04-11 | Stop reason: HOSPADM

## 2022-04-07 RX ORDER — CARBAMAZEPINE 200 MG/1
200 TABLET ORAL
Status: DISCONTINUED | OUTPATIENT
Start: 2022-04-07 | End: 2022-04-11 | Stop reason: HOSPADM

## 2022-04-07 RX ORDER — FUROSEMIDE 10 MG/ML
40 INJECTION INTRAMUSCULAR; INTRAVENOUS 2 TIMES DAILY
Status: DISCONTINUED | OUTPATIENT
Start: 2022-04-07 | End: 2022-04-08

## 2022-04-07 RX ORDER — ASPIRIN 81 MG/1
162 TABLET, CHEWABLE ORAL DAILY
Status: DISCONTINUED | OUTPATIENT
Start: 2022-04-07 | End: 2022-04-11 | Stop reason: HOSPADM

## 2022-04-07 RX ORDER — TAMSULOSIN HYDROCHLORIDE 0.4 MG/1
0.4 CAPSULE ORAL
Status: DISCONTINUED | OUTPATIENT
Start: 2022-04-07 | End: 2022-04-11 | Stop reason: HOSPADM

## 2022-04-07 RX ADMIN — IPRATROPIUM BROMIDE 0.5 MG: 0.5 SOLUTION RESPIRATORY (INHALATION) at 08:59

## 2022-04-07 RX ADMIN — METOPROLOL SUCCINATE 25 MG: 25 TABLET, EXTENDED RELEASE ORAL at 20:16

## 2022-04-07 RX ADMIN — FINASTERIDE 5 MG: 5 TABLET, FILM COATED ORAL at 08:15

## 2022-04-07 RX ADMIN — FUROSEMIDE 40 MG: 10 INJECTION, SOLUTION INTRAMUSCULAR; INTRAVENOUS at 08:15

## 2022-04-07 RX ADMIN — CARBAMAZEPINE 200 MG: 200 TABLET ORAL at 08:15

## 2022-04-07 RX ADMIN — ASPIRIN 162 MG: 81 TABLET, CHEWABLE ORAL at 08:15

## 2022-04-07 RX ADMIN — IPRATROPIUM BROMIDE 0.5 MG: 0.5 SOLUTION RESPIRATORY (INHALATION) at 12:34

## 2022-04-07 RX ADMIN — FUROSEMIDE 40 MG: 10 INJECTION, SOLUTION INTRAMUSCULAR; INTRAVENOUS at 17:26

## 2022-04-07 RX ADMIN — IPRATROPIUM BROMIDE 0.5 MG: 0.5 SOLUTION RESPIRATORY (INHALATION) at 19:27

## 2022-04-07 RX ADMIN — METOPROLOL SUCCINATE 25 MG: 25 TABLET, EXTENDED RELEASE ORAL at 08:15

## 2022-04-07 RX ADMIN — CLOPIDOGREL BISULFATE 75 MG: 75 TABLET ORAL at 08:15

## 2022-04-07 RX ADMIN — PANTOPRAZOLE SODIUM 20 MG: 20 TABLET, DELAYED RELEASE ORAL at 08:15

## 2022-04-07 RX ADMIN — TAMSULOSIN HYDROCHLORIDE 0.4 MG: 0.4 CAPSULE ORAL at 17:26

## 2022-04-07 RX ADMIN — MULTIPLE VITAMINS W/ MINERALS TAB 1 TABLET: TAB at 08:15

## 2022-04-07 ASSESSMENT — PAIN SCALES - GENERAL
PAINLEVEL_OUTOF10: 0

## 2022-04-07 NOTE — PROGRESS NOTES
P Quality Flow/Interdisciplinary Rounds Progress Note        Quality Flow Rounds held on April 7, 2022    Disciplines Attending:  Bedside Nurse, ,  and Nursing Unit Leadership    Julieta Dewitt was admitted on 4/6/2022  3:31 PM    Anticipated Discharge Date:       Disposition:    Dylan Score:  Dylan Scale Score: 18    Readmission Risk              Risk of Unplanned Readmission:  0           Discussed patient goal for the day, patient clinical progression, and barriers to discharge.   The following Goal(s) of the Day/Commitment(s) have been identified:  Check Cardiology plan, wean oxygen as tolerated, anticipate diuretics, PT/OT to eval.      Fer Barfield RN  April 7, 2022

## 2022-04-07 NOTE — CONSULTS
[facility] cardiology/[facility] of [address] [place] [address]    Name: Clarisse Frank    Age: 80 y.o. Date of Admission: 4/6/2022  3:31 PM    Date of Service: 4/7/2022    Reason for Consultation: Dyspnea on exertion and shortness of breath  Referring Physician:     History of Present Illness: The patient is a 80y.o. year old male with a known history of echocardiogram December 2021 LVEF 40% mild aortic valve stenosis and insufficiency and moderate pulmonary hypertension. He is admitted from extended care facility with some progressive shortness of breath and nonproductive cough. He denies any distinct chest pain or unstable anginal symptoms at this time. He reports he was in the hospital in December 2021 and I reviewed these records with him at that time he did have some vague chest symptoms and troponin was mildly elevated. He was medically managed for presumed underlying CAD. Past Medical History: Known history of hypertension, prior echocardiogram December 2021 with evidence of mild LV systolic dysfunction 16%, moderate pulmonary hypertension mild AS/AI. Past surgical history includes hernia surgery right hip surgery. Review of Systems:     Constitutional: No fever, chills, sweats  Cardiac: As per HPI  Pulmonary: No cough, wheeze, hemoptysis  HEENT: No visual disturbances or difficult swallowing  GI: No nausea, vomiting, diarrhea, abdominal pain, rectal bleeding  : No dysuria or hematuria  Endocrine: No excessive thirst, heat or cold intolerance. Musculoskeletal: No joint pain or muscle aches. No claudication  Skin: No skin breakdown or rashes  Neuro: No headache, confusion, or seizures  Psych: No depression, anxiety      Family History:  History reviewed. No pertinent family history. Social History:  Social History     Socioeconomic History    Marital status:       Spouse name: Not on file    Number of children: Not on file    Years of education: Not on file    Highest education level: Not on file   Occupational History    Not on file   Tobacco Use    Smoking status: Former Smoker     Years: 2.00     Quit date: 3/3/1945     Years since quittin.1    Smokeless tobacco: Never Used   Substance and Sexual Activity    Alcohol use: Yes     Comment: occasional    Drug use: No    Sexual activity: Not on file   Other Topics Concern    Not on file   Social History Narrative    Not on file     Social Determinants of Health     Financial Resource Strain:     Difficulty of Paying Living Expenses: Not on file   Food Insecurity:     Worried About Running Out of Food in the Last Year: Not on file    Dennis of Food in the Last Year: Not on file   Transportation Needs:     Lack of Transportation (Medical): Not on file    Lack of Transportation (Non-Medical):  Not on file   Physical Activity:     Days of Exercise per Week: Not on file    Minutes of Exercise per Session: Not on file   Stress:     Feeling of Stress : Not on file   Social Connections:     Frequency of Communication with Friends and Family: Not on file    Frequency of Social Gatherings with Friends and Family: Not on file    Attends [employer] Services: Not on file    Active Member of [address] [place] [address] [employer] or Organizations: Not on file    Attends Club or Organization Meetings: Not on file    Marital Status: Not on file   Intimate Partner Violence:     Fear of Current or Ex-Partner: Not on file    Emotionally Abused: Not on file    Physically Abused: Not on file    Sexually Abused: Not on file   Housing Stability:     Unable to Pay for Housing in the Last Year: Not on file    Number of [place] in the Last Year: Not on file    Unstable Housing in the Last Year: Not on file       Allergies:  No Known Allergies    Current Medications:  Current Facility-Administered Medications   Medication Dose Route Frequency Provider Last Rate Last Admin    aspirin chewable tablet 162 mg  162 mg Oral Daily Claude Blamer Kong Mejia MD   162 mg at 04/07/22 0815    carBAMazepine (TEGRETOL) tablet 200 mg  200 mg Oral QAM AC Jan Smalls MD   200 mg at 04/07/22 0815    clopidogrel (PLAVIX) tablet 75 mg  75 mg Oral Daily Jan Smalls MD   75 mg at 04/07/22 0815    finasteride (PROSCAR) tablet 5 mg  5 mg Oral QAM AC Jan Smalls MD   5 mg at 04/07/22 0815    therapeutic multivitamin-minerals 1 tablet  1 tablet Oral QAM AC Jan Smalls MD   1 tablet at 04/07/22 0815    nitroGLYCERIN (NITROSTAT) SL tablet 0.4 mg  0.4 mg SubLINGual Q5 Min PRN Jan Smalls MD        pantoprazole (PROTONIX) tablet 20 mg  20 mg Oral QAM AC Jan Smalls MD   20 mg at 04/07/22 0815    tamsulosin (FLOMAX) capsule 0.4 mg  0.4 mg Oral Dinner Jan Smalls MD        metoprolol succinate (TOPROL XL) extended release tablet 25 mg  25 mg Oral Q12H Jan Smalls MD   25 mg at 04/07/22 0815    furosemide (LASIX) injection 40 mg  40 mg IntraVENous BID Jan Smalls MD   40 mg at 04/07/22 0815    ipratropium (ATROVENT) 0.02 % nebulizer solution 0.5 mg  0.5 mg Nebulization TID Jan Smalls MD   0.5 mg at 04/07/22 0859         Physical Exam:  /71   Pulse 76   Temp 100.4 °F (38 °C) (Oral)   Resp 22   Ht 6' (1.829 m)   Wt 172 lb 6.4 oz (78.2 kg)   SpO2 96%   BMI 23.38 kg/m²   Weight change: Wt Readings from Last 3 Encounters:   04/07/22 172 lb 6.4 oz (78.2 kg)   12/10/21 173 lb 4.8 oz (78.6 kg)   07/25/21 166 lb (75.3 kg)         General: Awake, alert, oriented x3, no acute distress  HEENT: Unremarkable  Neck: No JVD or bruits. Cardiac: Regular rate and rhythm, normal S1 and S2, no extra heart sounds, murmurs, heaves, thrills  Resp: Lungs clear without wheezing or crackles. No accessory muscle use or retraction  Abdomen: soft, nontender, nondistended, no gross organomegaly or mass  Skin: Warm and dry, no cyanosis.   Musculoskeletal: normal tone and strength in the upper and lower extremities bilaterally  Neuro: Grossly unremarkable  Psych: Cooperative, and normal affect    Intake/Output:    Intake/Output Summary (Last 24 hours) at 4/7/2022 1023  Last data filed at 4/7/2022 0200  Gross per 24 hour   Intake 500 ml   Output 200 ml   Net 300 ml     No intake/output data recorded. Laboratory Tests:  Lab Results   Component Value Date    CREATININE 0.9 04/07/2022    BUN 22 04/07/2022     04/07/2022    K 3.9 04/07/2022     (H) 04/07/2022    CO2 26 04/07/2022     No results for input(s): CKTOTAL, CKMB in the last 72 hours. Invalid input(s): TROPONONI  No results found for: BNP  Lab Results   Component Value Date    WBC 5.5 04/07/2022    RBC 3.21 04/07/2022    HGB 10.2 04/07/2022    HCT 32.2 04/07/2022    .3 04/07/2022    MCH 31.8 04/07/2022    MCHC 31.7 04/07/2022    RDW 14.3 04/07/2022     04/07/2022    MPV 9.1 04/07/2022     Recent Labs     04/06/22  1700   ALKPHOS 102   ALT 19   AST 21   PROT 6.8   BILITOT 0.5   BILIDIR <0.2   LABALBU 3.8     Lab Results   Component Value Date    MG 2.3 04/06/2022     Lab Results   Component Value Date    PROTIME 13.2 04/06/2022    PROTIME 11.8 10/27/2010    INR 1.2 04/06/2022     No results found for: TSH  No components found for: CHLPL  No results found for: TRIG  No results found for: HDL  No results found for: [address]  Lab Results   Component Value Date    INR 1.2 04/06/2022       Admission ECG April 6, 2022 personally reviewed by me revealed normal sinus rhythm heart rate 102, left ventricular hypertrophy by voltage criteria,  . Personally reviewed his telemetry and shows normal sinus rhythm heart rate in the 60s. ASSESSMENT / PLAN:    1. Dyspnea on exertional shortness of breath possibly low-grade acute on chronic systolic CHF and note BNP 18,418 on admission. Has received 1 dose of IV Lasix yesterday evening 40 mg and scheduled now to receive 40 mg IV twice a day. Suspect an accurate intake and output.     Up to chair, use incentive spirometer every 30 minutes when awake. #2 hypertension and blood pressure reasonable on current medical regimen. #3 elevated troponin 98 then 90. At 80years old would presume some degree of underlying CAD and continue medical management of cardiovascular risk factors optimizing blood pressure blood sugar and lipids long-term. Continue dual antiplatelet therapy clopidogrel 75 mg daily, aspirin 81 mg daily, metoprolol succinate 25 mg twice a day. #4 moderate pulmonary hypertension and continue afterload reduction optimizing blood pressure. Mobilize to chair with meals.         Electronically signed by Shreya Burt MD on 4/7/2022 at 10:23 AM

## 2022-04-07 NOTE — PROGRESS NOTES
Toby served Dr. Derian Montenegro covering for admission orders. Answered back. Will be here  About 20 min.

## 2022-04-07 NOTE — H&P
History and Physical  Internal Medicine  Hugh Avila MD    CHIEF COMPLAINT:  SOB/edema      HISTORY OF PRESENT ILLNESS:      The patient is a 80 y.o. male patient of Dr Silvina Weaver who presents with as per ER - Pepitoalva Williamson is a 80 y.o. male presenting to the ED for shortness of breath, beginning 3-4 days ago. The complaint has been intermittent, moderate in severity, and worsened by nothing. 81 yo m from [facility] [employer] w history of CHF, left hemopneumothorax, nstemi, was recently rx o2 2-3 months ago after previous hospital visit. Pt notes over the last few days has been using o2 continuously at assisted living has had increased cough and congestion. Pt denies any cp, abdominal pain, n,v,d, fever. Per son pt has had some confusion. Pt notes lots of chest cough. Pt denies any issues aspiration swallowing or choking foods. Pt has a [employer] [place] [employer] [place]. Pt was given lasix 60mg po per order from dr Tamara Ocampo. Pt is on plavix and asapirin. Pt states he is vaccinated for coivd pt has swollen legs but is chronic and not worse. Pt does note some reccent weight gain           Past Medical History:   Diagnosis Date    Hypertension            Past Surgical History:   Procedure Laterality Date    HERNIA REPAIR      HIP SURGERY Right 12/07/2017    ORIF right hip  Dr. Raisa Coley       Medications Prior to Admission:    Medications Prior to Admission: aspirin 81 MG chewable tablet, Take 2 tablets by mouth daily  nitroGLYCERIN (NITROSTAT) 0.4 MG SL tablet, up to max of 3 total doses. If no relief after 1 dose, call 911.   metoprolol tartrate (LOPRESSOR) 25 MG tablet, Take 1 tablet by mouth 2 times daily  furosemide (LASIX) 40 MG tablet, Take 1 tablet by mouth daily  clopidogrel (PLAVIX) 75 MG tablet, Take 1 tablet by mouth daily  pantoprazole (PROTONIX) 20 MG tablet, Take 1 tablet by mouth every morning (before breakfast)  Naftifine HCl 2 % CREA, Apply 1 g topically daily  clotrimazole-betamethasone (LOTRISONE) 1-0.05 % cream, Apply topically 2 times daily. finasteride (PROSCAR) 5 MG tablet, Take 5 mg by mouth every morning (before breakfast)  carBAMazepine (TEGRETOL) 200 MG tablet, Take 200 mg by mouth every morning (before breakfast)  metoprolol succinate (TOPROL XL) 25 MG extended release tablet, Take 12.5 mg by mouth every morning (before breakfast)  Multiple Vitamins-Minerals (THERAPEUTIC MULTIVITAMIN-MINERALS) tablet, Take 1 tablet by mouth every morning (before breakfast)  tamsulosin (FLOMAX) 0.4 MG capsule, Take 0.4 mg by mouth Daily with supper    Allergies:    Patient has no known allergies. Social History:    reports that he quit smoking about 77 years ago. He quit after 2.00 years of use. He has never used smokeless tobacco. He reports current alcohol use. He reports that he does not use drugs. Family History:   family history is not on file. REVIEW OF SYSTEMS:  As above in the HPI, otherwise negative    Vital Signs:  BP (!) 144/75   Pulse 86   Temp 97.7 °F (36.5 °C) (Infrared)   Resp 20   Ht 6' (1.829 m)   Wt 172 lb 6.4 oz (78.2 kg)   SpO2 96%   BMI 23.38 kg/m²     Physical Exam:    General appearance: alert, appears stated age and cooperative  Head: Normocephalic, without obvious abnormality, atraumatic  Eyes: conjunctivae/corneas clear. PERRL, EOM's intact. Fundi benign. Throat: lips, mucosa, and tongue normal; teeth and gums normal  Neck: no adenopathy, no carotid bruit, no JVD, supple, symmetrical, trachea midline and thyroid not enlarged, symmetric, no tenderness/mass/nodules  Back: symmetric, no curvature. ROM normal. No CVA tenderness.   Lungs: diminished breath sounds bilaterally and rhonchi bilaterally  Chest wall: no tenderness  Heart: regular rate and rhythm  Abdomen: soft, non-tender; bowel sounds normal; no masses,  no organomegaly  Extremities: edema  Pulses: 2+ and symmetric  Skin: Skin color, texture, turgor normal. No rashes or lesions  Lymph nodes: Cervical, supraclavicular, and axillary nodes normal.  Neurologic: Grossly normal  Rectal: deferred    LABS:    Recent Results (from the past 24 hour(s))   CBC with Auto Differential    Collection Time: 04/06/22  5:00 PM   Result Value Ref Range    WBC 6.0 4.5 - 11.5 E9/L    RBC 3.55 (L) 3.80 - 5.80 E12/L    Hemoglobin 11.4 (L) 12.5 - 16.5 g/dL    Hematocrit 35.6 (L) 37.0 - 54.0 %    .3 (H) 80.0 - 99.9 fL    MCH 32.1 26.0 - 35.0 pg    MCHC 32.0 32.0 - 34.5 %    RDW 14.1 11.5 - 15.0 fL    Platelets 116 730 - 234 E9/L    MPV 8.7 7.0 - 12.0 fL    Neutrophils % 74.9 43.0 - 80.0 %    Immature Granulocytes % 0.5 0.0 - 5.0 %    Lymphocytes % 14.1 (L) 20.0 - 42.0 %    Monocytes % 10.3 2.0 - 12.0 %    Eosinophils % 0.0 0.0 - 6.0 %    Basophils % 0.2 0.0 - 2.0 %    Neutrophils Absolute 4.53 1.80 - 7.30 E9/L    Immature Granulocytes # 0.03 E9/L    Lymphocytes Absolute 0.85 (L) 1.50 - 4.00 E9/L    Monocytes Absolute 0.62 0.10 - 0.95 E9/L    Eosinophils Absolute 0.00 (L) 0.05 - 0.50 E9/L    Basophils Absolute 0.01 0.00 - 0.20 E9/L   Magnesium    Collection Time: 04/06/22  5:00 PM   Result Value Ref Range    Magnesium 2.3 1.6 - 2.6 mg/dL   Lactic Acid    Collection Time: 04/06/22  5:00 PM   Result Value Ref Range    Lactic Acid 1.3 0.5 - 2.2 mmol/L   Troponin    Collection Time: 04/06/22  5:00 PM   Result Value Ref Range    Troponin, High Sensitivity 90 (H) 0 - 11 ng/L   Brain Natriuretic Peptide    Collection Time: 04/06/22  5:00 PM   Result Value Ref Range    Pro-BNP 18,418 (H) 0 - 450 pg/mL   Protime-INR    Collection Time: 04/06/22  5:00 PM   Result Value Ref Range    Protime 13.2 (H) 9.3 - 12.4 sec    INR 1.2    APTT    Collection Time: 04/06/22  5:00 PM   Result Value Ref Range    aPTT 26.6 24.5 - 35.1 sec   Hepatic Function Panel    Collection Time: 04/06/22  5:00 PM   Result Value Ref Range    Total Protein 6.8 6.4 - 8.3 g/dL    Albumin 3.8 3.5 - 5.2 g/dL    Alkaline Phosphatase 102 40 - 129 U/L    ALT 19 0 - 40 U/L    AST 21 0 - 39 U/L    Total Bilirubin 0.5 0.0 - 1.2 mg/dL    Bilirubin, Direct <0.2 0.0 - 0.3 mg/dL    Bilirubin, Indirect see below 0.0 - 1.0 mg/dL   Lipase    Collection Time: 04/06/22  5:00 PM   Result Value Ref Range    Lipase 24 13 - 60 U/L   Basic Metabolic Panel    Collection Time: 04/06/22  5:00 PM   Result Value Ref Range    Sodium 142 132 - 146 mmol/L    Potassium 4.3 3.5 - 5.0 mmol/L    Chloride 108 (H) 98 - 107 mmol/L    CO2 24 22 - 29 mmol/L    Anion Gap 10 7 - 16 mmol/L    Glucose 124 (H) 74 - 99 mg/dL    BUN 22 6 - 23 mg/dL    CREATININE 0.8 0.7 - 1.2 mg/dL    GFR Non-African American >60 >=60 mL/min/1.73    GFR African American >60     Calcium 8.8 8.6 - 10.2 mg/dL   C-Reactive Protein    Collection Time: 04/06/22  5:00 PM   Result Value Ref Range    CRP 0.6 (H) 0.0 - 0.4 mg/dL   Sedimentation Rate    Collection Time: 04/06/22  5:00 PM   Result Value Ref Range    Sed Rate 3 0 - 15 mm/Hr   COVID-19, Rapid    Collection Time: 04/06/22  5:00 PM    Specimen: Nasopharyngeal Swab   Result Value Ref Range    SARS-CoV-2, NAAT Not Detected Not Detected   RAPID INFLUENZA A/B ANTIGENS    Collection Time: 04/06/22  5:00 PM    Specimen: Nasopharyngeal   Result Value Ref Range    Influenza A by PCR Not Detected Not Detected    Influenza B by PCR Not Detected Not Detected   Blood Gas, Arterial    Collection Time: 04/06/22  5:03 PM   Result Value Ref Range    Date Analyzed [id number]     Time Analyzed 1703     Source: Blood Arterial     pH, Blood Gas 7.446 7.350 - 7.450    PCO2 31.9 (L) 35.0 - 45.0 mmHg    PO2 83.7 75.0 - 100.0 mmHg    HCO3 21.5 (L) 22.0 - 26.0 mmol/L    B.E. -1.8 -3.0 - 3.0 mmol/L    O2 Sat 96.2 92.0 - 98.5 %    O2Hb 94.9 94.0 - 97.0 %    COHb 1.1 0.0 - 1.5 %    MetHb 0.3 0.0 - 1.5 %    O2 Content 15.7 mL/dL    HHb 3.7 0.0 - 5.0 %    tHb (est) 11.7 11.5 - 16.5 g/dL    Mode NC- 3 L     Date Of Collection      Time Collected      Pt Temp 37.0 C     ID [id number]     Lab [id number]     Critical(s) Notified .  No Critical Values    EKG 12 Lead Collection Time: 04/06/22  5:17 PM   Result Value Ref Range    Ventricular Rate 102 BPM    Atrial Rate 102 BPM    P-R Interval 146 ms    QRS Duration 92 ms    Q-T Interval 340 ms    QTc Calculation (Bazett) 443 ms    R Axis 49 degrees    T Axis 102 degrees   Troponin    Collection Time: 04/06/22  7:08 PM   Result Value Ref Range    Troponin, High Sensitivity 98 (H) 0 - 11 ng/L       Radiology:     Chest  Xray:  Results for orders placed during the hospital encounter of 03/06/21    XR CHEST (2 VW)    Narrative  EXAMINATION:  TWO XRAY VIEWS OF THE CHEST    3/6/2021 11:31 am    COMPARISON:  08/03/2020 and 05/14/2019    HISTORY:  ORDERING SYSTEM PROVIDED HISTORY: fall  TECHNOLOGIST PROVIDED HISTORY:  Reason for exam:->fall    FINDINGS:  There is a small right pleural effusion. Strandy densities seen at both lung  bases, greater on the right, most likely atelectasis. No definite acute  infiltrate is seen. There is no evidence of left effusion. Lungs are  somewhat hyperinflated, suggestive of COPD. There is mild cardiomegaly. The  heart and mediastinum are not significantly changed and there is no evidence  of vascular congestion. No pneumothorax seen. There is degenerative change in the thoracic spine. Mild compression  deformity at about T10 is noted. This is of indeterminate age but was not  present on 05/14/2019. Impression  1. Small right pleural effusion. Mild bibasilar atelectasis, greater on the  right. 2. Mild cardiomegaly. 3. No pneumothorax. Probable COPD. 4. Mild compression deformity of T10, of indeterminate age but new compared  to May 2019. If clinically indicated, CT of the thoracic spine could be done  for further evaluation. Results for orders placed during the hospital encounter of 04/06/22    XR CHEST PORTABLE    Narrative  EXAMINATION:  ONE XRAY VIEW OF THE CHEST    4/6/2022 4:25 pm    COMPARISON:  12/08/2021.     HISTORY:  ORDERING SYSTEM PROVIDED HISTORY: cough and congestion  TECHNOLOGIST PROVIDED HISTORY:  Reason for exam:->cough and congestion    FINDINGS:  Probable small right pleural effusion. Pneumothorax. Cardiomegaly with  pulmonary vascular congestive changes. The osseous structures are without  acute process. Impression  Cardiomegaly with probable pulmonary vascular congestive changes. Probable small right pleural effusion. Other:  none      ASSESSMENT:      Principal Problem:    Acute respiratory failure with hypoxia ([facility])  Active Problems:    Essential hypertension    Seizure disorder (HCC)    PVD (peripheral vascular disease) (HCC)    CHF (congestive heart failure) ([facility])  Resolved Problems:    * No resolved hospital problems.  *      PLAN:    Kirill pazt for the secretions    Kecia Bowling MD  6:18 AM  4/7/2022

## 2022-04-07 NOTE — CARE COORDINATION
Social work / Discharge planning:       Patient is from the [facility] at [facility]. [facility] and she is checking to see patient's baseline oxygen use (currently on 5 liters) and functional status prior to admission. He is currently on IV Lasix. PT/OT evaluations ordered to assist in determining if patient can return to AL LOC. Electronically signed by ELENITA Restrepo on 4/7/2022 at 10:04 AM          Social work received return call from [address] at [facility] and was informed patient ambulates independently at baseline and does not use oxygen at the AL. Awaiting therapy evaluations.    Electronically signed by ELENITA Restrepo on 4/7/2022 at 11:41 AM

## 2022-04-08 LAB
ANION GAP SERPL CALCULATED.3IONS-SCNC: 9 MMOL/L (ref 7–16)
BASOPHILS ABSOLUTE: 0.01 E9/L (ref 0–0.2)
BASOPHILS RELATIVE PERCENT: 0.2 % (ref 0–2)
BUN BLDV-MCNC: 25 MG/DL (ref 6–23)
CALCIUM SERPL-MCNC: 9 MG/DL (ref 8.6–10.2)
CHLORIDE BLD-SCNC: 104 MMOL/L (ref 98–107)
CO2: 27 MMOL/L (ref 22–29)
CREAT SERPL-MCNC: 0.9 MG/DL (ref 0.7–1.2)
EOSINOPHILS ABSOLUTE: 0.03 E9/L (ref 0.05–0.5)
EOSINOPHILS RELATIVE PERCENT: 0.6 % (ref 0–6)
GFR AFRICAN AMERICAN: >60
GFR NON-AFRICAN AMERICAN: >60 ML/MIN/1.73
GLUCOSE BLD-MCNC: 137 MG/DL (ref 74–99)
HCT VFR BLD CALC: 34.9 % (ref 37–54)
HEMOGLOBIN: 11 G/DL (ref 12.5–16.5)
IMMATURE GRANULOCYTES #: 0.02 E9/L
IMMATURE GRANULOCYTES %: 0.4 % (ref 0–5)
LYMPHOCYTES ABSOLUTE: 1.34 E9/L (ref 1.5–4)
LYMPHOCYTES RELATIVE PERCENT: 26.3 % (ref 20–42)
MCH RBC QN AUTO: 32.1 PG (ref 26–35)
MCHC RBC AUTO-ENTMCNC: 31.5 % (ref 32–34.5)
MCV RBC AUTO: 101.7 FL (ref 80–99.9)
MONOCYTES ABSOLUTE: 0.78 E9/L (ref 0.1–0.95)
MONOCYTES RELATIVE PERCENT: 15.3 % (ref 2–12)
NEUTROPHILS ABSOLUTE: 2.92 E9/L (ref 1.8–7.3)
NEUTROPHILS RELATIVE PERCENT: 57.2 % (ref 43–80)
PDW BLD-RTO: 14.3 FL (ref 11.5–15)
PLATELET # BLD: 167 E9/L (ref 130–450)
PMV BLD AUTO: 9.3 FL (ref 7–12)
POTASSIUM SERPL-SCNC: 4 MMOL/L (ref 3.5–5)
RBC # BLD: 3.43 E12/L (ref 3.8–5.8)
SODIUM BLD-SCNC: 140 MMOL/L (ref 132–146)
WBC # BLD: 5.1 E9/L (ref 4.5–11.5)

## 2022-04-08 PROCEDURE — 6370000000 HC RX 637 (ALT 250 FOR IP): Performed by: INTERNAL MEDICINE

## 2022-04-08 PROCEDURE — 36415 COLL VENOUS BLD VENIPUNCTURE: CPT

## 2022-04-08 PROCEDURE — 97161 PT EVAL LOW COMPLEX 20 MIN: CPT

## 2022-04-08 PROCEDURE — 96376 TX/PRO/DX INJ SAME DRUG ADON: CPT

## 2022-04-08 PROCEDURE — G0378 HOSPITAL OBSERVATION PER HR: HCPCS

## 2022-04-08 PROCEDURE — 80048 BASIC METABOLIC PNL TOTAL CA: CPT

## 2022-04-08 PROCEDURE — 6360000002 HC RX W HCPCS: Performed by: INTERNAL MEDICINE

## 2022-04-08 PROCEDURE — 97165 OT EVAL LOW COMPLEX 30 MIN: CPT | Performed by: OCCUPATIONAL THERAPIST

## 2022-04-08 PROCEDURE — 85025 COMPLETE CBC W/AUTO DIFF WBC: CPT

## 2022-04-08 PROCEDURE — 2700000000 HC OXYGEN THERAPY PER DAY

## 2022-04-08 PROCEDURE — 94640 AIRWAY INHALATION TREATMENT: CPT

## 2022-04-08 RX ORDER — BUMETANIDE 1 MG/1
2 TABLET ORAL DAILY
Status: DISCONTINUED | OUTPATIENT
Start: 2022-04-08 | End: 2022-04-11 | Stop reason: HOSPADM

## 2022-04-08 RX ADMIN — METOPROLOL SUCCINATE 25 MG: 25 TABLET, EXTENDED RELEASE ORAL at 09:20

## 2022-04-08 RX ADMIN — MULTIPLE VITAMINS W/ MINERALS TAB 1 TABLET: TAB at 09:20

## 2022-04-08 RX ADMIN — FINASTERIDE 5 MG: 5 TABLET, FILM COATED ORAL at 05:52

## 2022-04-08 RX ADMIN — PANTOPRAZOLE SODIUM 20 MG: 20 TABLET, DELAYED RELEASE ORAL at 05:52

## 2022-04-08 RX ADMIN — IPRATROPIUM BROMIDE 0.5 MG: 0.5 SOLUTION RESPIRATORY (INHALATION) at 20:07

## 2022-04-08 RX ADMIN — CARBAMAZEPINE 200 MG: 200 TABLET ORAL at 05:52

## 2022-04-08 RX ADMIN — BUMETANIDE 2 MG: 1 TABLET ORAL at 15:03

## 2022-04-08 RX ADMIN — FUROSEMIDE 40 MG: 10 INJECTION, SOLUTION INTRAMUSCULAR; INTRAVENOUS at 09:20

## 2022-04-08 RX ADMIN — METOPROLOL SUCCINATE 25 MG: 25 TABLET, EXTENDED RELEASE ORAL at 23:07

## 2022-04-08 RX ADMIN — IPRATROPIUM BROMIDE 0.5 MG: 0.5 SOLUTION RESPIRATORY (INHALATION) at 09:10

## 2022-04-08 RX ADMIN — TAMSULOSIN HYDROCHLORIDE 0.4 MG: 0.4 CAPSULE ORAL at 16:49

## 2022-04-08 RX ADMIN — IPRATROPIUM BROMIDE 0.5 MG: 0.5 SOLUTION RESPIRATORY (INHALATION) at 14:11

## 2022-04-08 RX ADMIN — CLOPIDOGREL BISULFATE 75 MG: 75 TABLET ORAL at 09:20

## 2022-04-08 RX ADMIN — ASPIRIN 162 MG: 81 TABLET, CHEWABLE ORAL at 09:20

## 2022-04-08 ASSESSMENT — PAIN SCALES - GENERAL
PAINLEVEL_OUTOF10: 0
PAINLEVEL_OUTOF10: 0

## 2022-04-08 NOTE — CARE COORDINATION
Social work / Discharge Planning:        Patient is from the [facility] at [facility]. Awaiting PT/OT evaluations to assist in determining if patient can return to AL LOC. Patient is currently on 4 liters of oxygen which he does not have at the AL. Currently on IV Lasix. Social work continues to follow.   Electronically signed by ELENITA Restrepo on 4/8/2022 at 9:16 AM

## 2022-04-08 NOTE — PROGRESS NOTES
Occupational Therapy  OCCUPATIONAL THERAPY INITIAL EVALUATION     [address] [facility], [address]  Patient Name: Myles White  MRN: [id number]  : 1922  Room: [address] [place] [address]    Evaluating OT: Aleena HENDERSON, OTR/L #[id number]    Referring Provider: Scott Olivo MD  Specific Provider Orders/Date: eval and treat 2022    Diagnosis: Abnormal ECG [R94.31]  Acute respiratory failure with hypoxia ([address] [J96.01]       Pertinent Medical History:   Past Medical History:   Diagnosis Date    Hypertension         Precautions:  fall risk, alarm, 4 L of O2    Assessment of current deficits   [x] Functional mobility  [x]ADLs  [x] Strength               []Cognition   [x] Functional transfers   [x] IADLs         [x] Safety Awareness   [x]Endurance   [] Fine Coordination              [x] Balance      [] Vision/perception   []Sensation    []Gross Motor Coordination  [] ROM  [] Delirium                   [] Motor Control     OT PLAN OF CARE   OT POC based on physician orders, patient diagnosis and results of clinical assessment    Frequency/Duration 2-5 days/wk for 10-14 days PRN   Specific OT Treatment Interventions to include:   * Instruction/training on adapted ADL techniques and AE recommendations to increase functional independence within precautions       * Training on energy conservation strategies, correct breathing pattern and techniques to improve independence/tolerance for self-care routine  * Functional transfer/mobility training/DME recommendations for increased independence, safety, and fall prevention  * Patient/Family education to increase follow through with safety techniques and functional independence  * Recommendation of environmental modifications for increased safety with functional transfers/mobility and ADLs  * Therapeutic exercise to improve motor endurance, ROM, and functional strength for ADLs/functional transfers  * Therapeutic activities to facilitate/challenge dynamic balance, stand tolerance for increased safety and independence with ADLs    Recommended Adaptive Equipment: TBD     Home Living: Pt admit from [facility]. Equipment Owned: walker    Prior Level of Function: assist with ADLs, assist with IADLs-dining dougherty services provided at [facility]. Completed functional mobility with walker    Pain Level: no pain reported    Cognition: A&O: pt alert and appropriately conversing with staff   Problem solving:  Fair +   Sequencing: fair +   Memory: fair +   Judgement/safety:  Fair +     Functional Assessment: AM-PAC Daily Activity Raw Score: 16/24   Initial Eval Status  Date: 4/8/2022 Treatment session:  STGs=LTGS  Timeframe 10-14 days     Feeding  independent  Able to manage utensils and cup to drink coffee     Grooming  SBA  supervision   UB Dressing  min A  Assist with donning new gown   SBA   LB Dressing Mod A  Assist donning B socks  Able to lift/cross legs    SBA with use of AD as needed   Bathing Mod A    SBA   Toileting Mod A  Incontinent of urine upon arrival  Min A   Bed Mobility  Supine to sit: SBA  Supervision   Functional Transfers Min A  Sit <> stand from bed Cues for hand placement and safe technique   Supervision   Functional Mobility Min A  With walker in room Cues for safe wheeled walker management and navigation  SBA with AD as needed with fair  tolerance   Balance Sitting:        Static: good       Dynamic: good    Standing: fair +  Sitting:        Static: good       Dynamic: good    Standing: good   Activity Tolerance Fair +  Pt limited by overall decreased endurance. O2 sats sitting EOB 96%  No SOB noted after light activity  good during ADL activity.  Pt will verbalize and demonstrate good understanding of ECWS techniques      Strength ROM Additional Info:    CALE  [facility] WFL good  and FMC/dexterity noted during ADL tasks     LUE ALEJANDRO/[facility] WFL good  and FMC/dexterity noted during ADL tasks Hearing: [place]   Vision: WFL glasses  Sensation:  No c/o numbness or tingling   Tone: WFL   Edema: mild BLE     Comments: Upon arrival, patient supine in bed, pt cleared by nursing for therapy. At end of session, patient up in chair with call light and phone within reach, all lines and tubes intact. Overall patient demonstrated decreased independence and safety during completion of ADL/functional transfer/mobility tasks. Pt would benefit from continued skilled OT to increase safety and independence with completion of ADL/IADL tasks for functional independence and quality of life. Bed/chair alarm: on    Rehab Potential: Good for established goals     Patient/Family Goal: To get home. Patient and/or family were instructed on functional diagnosis, prognosis/goals and OT plan of care. Pt verbalized understanding.       Eval Complexity: Low    Time In: 8:38  Time Out: 9:06  Total Treatment Time: 0    Min Units   OT Eval Low 97165 x  1   OT Eval Medium 31520      OT Eval High 67393       OT Re-Eval [id number]       Therapeutic Ex 99781       Therapeutic Activities 38321       ADL/Self Care 53114       Orthotic Management 96076       Neuro Re-Ed 79175       Non-Billable Time          Evaluation time includes thorough review of current medical information, gathering information on past medical history/social history and prior level of function, completion of standardized testing/informal observation of tasks, assessment of data, and development of POC/Goals    Yamileth WOOD [facility], OTR/L #[id number]

## 2022-04-08 NOTE — PROGRESS NOTES
Internal Medicine  Progress Note  Dessie Crigler, MD     Subjective:     Patient is alert. Patient reports OK. Tolerating diet well no other c/o. Scheduled Meds:   aspirin  162 mg Oral Daily    carBAMazepine  200 mg Oral QAM AC    clopidogrel  75 mg Oral Daily    finasteride  5 mg Oral QAM AC    therapeutic multivitamin-minerals  1 tablet Oral QAM AC    pantoprazole  20 mg Oral QAM AC    tamsulosin  0.4 mg Oral Dinner    metoprolol succinate  25 mg Oral Q12H    furosemide  40 mg IntraVENous BID    ipratropium  0.5 mg Nebulization TID     Continuous Infusions:  PRN Meds:nitroGLYCERIN    Objective:      Physical Exam:  Vitals:   /87   Pulse 96   Temp 99.2 °F (37.3 °C) (Oral)   Resp 20   Ht 6' (1.829 m)   Wt 172 lb 6.4 oz (78.2 kg)   SpO2 99%   BMI 23.38 kg/m²   Orthostatic BPs and Heart Rates:     I/O's    Intake/Output Summary (Last 24 hours) at 4/8/2022 3023  Last data filed at 4/8/2022 0439  Gross per 24 hour   Intake --   Output 400 ml   Net -400 ml     Exam:  General appearance: alert, appears stated age and cooperative  Head: Normocephalic, without obvious abnormality, atraumatic  Eyes: conjunctivae/corneas clear. PERRL, EOM's intact. Fundi benign. Throat: lips, mucosa, and tongue normal; teeth and gums normal  Neck: no adenopathy, no carotid bruit, no JVD, supple, symmetrical, trachea midline and thyroid not enlarged, symmetric, no tenderness/mass/nodules  Back: symmetric, no curvature. ROM normal. No CVA tenderness.   Lungs: rhonchi coarse B  Chest wall: no tenderness  Heart: irregularly irregular rhythm  Abdomen: soft, non-tender; bowel sounds normal; no masses,  no organomegaly  Extremities: extremities normal, atraumatic, no cyanosis or edema  Pulses: 2+ and symmetric  Skin: Skin color, texture, turgor normal. No rashes or lesions  Lymph nodes: Cervical, supraclavicular, and axillary nodes normal.  Neurologic: Grossly normal      Imaging     Chest  Xray:  Results for orders placed during the hospital encounter of 03/06/21    XR CHEST (2 VW)    Narrative  EXAMINATION:  TWO XRAY VIEWS OF THE CHEST    3/6/2021 11:31 am    COMPARISON:  08/03/2020 and 05/14/2019    HISTORY:  ORDERING SYSTEM PROVIDED HISTORY: fall  TECHNOLOGIST PROVIDED HISTORY:  Reason for exam:->fall    FINDINGS:  There is a small right pleural effusion. Strandy densities seen at both lung  bases, greater on the right, most likely atelectasis. No definite acute  infiltrate is seen. There is no evidence of left effusion. Lungs are  somewhat hyperinflated, suggestive of COPD. There is mild cardiomegaly. The  heart and mediastinum are not significantly changed and there is no evidence  of vascular congestion. No pneumothorax seen. There is degenerative change in the thoracic spine. Mild compression  deformity at about T10 is noted. This is of indeterminate age but was not  present on 05/14/2019. Impression  1. Small right pleural effusion. Mild bibasilar atelectasis, greater on the  right. 2. Mild cardiomegaly. 3. No pneumothorax. Probable COPD. 4. Mild compression deformity of T10, of indeterminate age but new compared  to May 2019. If clinically indicated, CT of the thoracic spine could be done  for further evaluation. Results for orders placed during the hospital encounter of 04/06/22    XR CHEST PORTABLE    Narrative  EXAMINATION:  ONE XRAY VIEW OF THE CHEST    4/6/2022 4:25 pm    COMPARISON:  12/08/2021. HISTORY:  ORDERING SYSTEM PROVIDED HISTORY: cough and congestion  TECHNOLOGIST PROVIDED HISTORY:  Reason for exam:->cough and congestion    FINDINGS:  Probable small right pleural effusion. Pneumothorax. Cardiomegaly with  pulmonary vascular congestive changes. The osseous structures are without  acute process. Impression  Cardiomegaly with probable pulmonary vascular congestive changes. Probable small right pleural effusion.       Additional Imaging:  none    Lab Review   Recent Results (from the past 24 hour(s))   Basic Metabolic Panel    Collection Time: 04/07/22  7:08 AM   Result Value Ref Range    Sodium 143 132 - 146 mmol/L    Potassium 3.9 3.5 - 5.0 mmol/L    Chloride 109 (H) 98 - 107 mmol/L    CO2 26 22 - 29 mmol/L    Anion Gap 8 7 - 16 mmol/L    Glucose 113 (H) 74 - 99 mg/dL    BUN 22 6 - 23 mg/dL    CREATININE 0.9 0.7 - 1.2 mg/dL    GFR Non-African American >60 >=60 mL/min/1.73    GFR African American >60     Calcium 8.4 (L) 8.6 - 10.2 mg/dL   CBC with Auto Differential    Collection Time: 04/07/22  7:08 AM   Result Value Ref Range    WBC 5.5 4.5 - 11.5 E9/L    RBC 3.21 (L) 3.80 - 5.80 E12/L    Hemoglobin 10.2 (L) 12.5 - 16.5 g/dL    Hematocrit 32.2 (L) 37.0 - 54.0 %    .3 (H) 80.0 - 99.9 fL    MCH 31.8 26.0 - 35.0 pg    MCHC 31.7 (L) 32.0 - 34.5 %    RDW 14.3 11.5 - 15.0 fL    Platelets 370 509 - 113 E9/L    MPV 9.1 7.0 - 12.0 fL    Neutrophils % 65.2 43.0 - 80.0 %    Immature Granulocytes % 0.4 0.0 - 5.0 %    Lymphocytes % 20.9 20.0 - 42.0 %    Monocytes % 13.0 (H) 2.0 - 12.0 %    Eosinophils % 0.0 0.0 - 6.0 %    Basophils % 0.5 0.0 - 2.0 %    Neutrophils Absolute 3.61 1.80 - 7.30 E9/L    Immature Granulocytes # 0.02 E9/L    Lymphocytes Absolute 1.16 (L) 1.50 - 4.00 E9/L    Monocytes Absolute 0.72 0.10 - 0.95 E9/L    Eosinophils Absolute 0.00 (L) 0.05 - 0.50 E9/L    Basophils Absolute 0.03 0.00 - 0.20 E9/L   CBC with Auto Differential    Collection Time: 04/08/22  5:37 AM   Result Value Ref Range    WBC 5.1 4.5 - 11.5 E9/L    RBC 3.43 (L) 3.80 - 5.80 E12/L    Hemoglobin 11.0 (L) 12.5 - 16.5 g/dL    Hematocrit 34.9 (L) 37.0 - 54.0 %    .7 (H) 80.0 - 99.9 fL    MCH 32.1 26.0 - 35.0 pg    MCHC 31.5 (L) 32.0 - 34.5 %    RDW 14.3 11.5 - 15.0 fL    Platelets 877 800 - 080 E9/L    MPV 9.3 7.0 - 12.0 fL    Neutrophils % 57.2 43.0 - 80.0 %    Immature Granulocytes % 0.4 0.0 - 5.0 %    Lymphocytes % 26.3 20.0 - 42.0 %    Monocytes % 15.3 (H) 2.0 - 12.0 %    Eosinophils % 0.6 0.0 - 6.0 %    Basophils % 0.2 0.0 - 2.0 %    Neutrophils Absolute 2.92 1.80 - 7.30 E9/L    Immature Granulocytes # 0.02 E9/L    Lymphocytes Absolute 1.34 (L) 1.50 - 4.00 E9/L    Monocytes Absolute 0.78 0.10 - 0.95 E9/L    Eosinophils Absolute 0.03 (L) 0.05 - 0.50 E9/L    Basophils Absolute 0.01 0.00 - 0.20 E9/L     Assessment:     Principal Problem:    Acute respiratory failure with hypoxia (HCC)  Active Problems:    Essential hypertension    Seizure disorder (HCC)    PVD (peripheral vascular disease) (HCC)    [facility]  Resolved Problems:    * No resolved hospital problems.  *      Plan:   Same  Little change from yesterday  Ciarra Lane MD  4/8/2022  6:25 AM

## 2022-04-08 NOTE — PROGRESS NOTES
P Quality Flow/Interdisciplinary Rounds Progress Note        Quality Flow Rounds held on April 8, 2022    Disciplines Attending:  Bedside Nurse, ,  and Nursing Unit Leadership    Halford Closs was admitted on 4/6/2022  3:31 PM    Anticipated Discharge Date:       Disposition:    Dylan Score:  Dylan Scale Score: 18    Readmission Risk              Risk of Unplanned Readmission:  0           Discussed patient goal for the day, patient clinical progression, and barriers to discharge. The following Goal(s) of the Day/Commitment(s) have been identified:  Transition to PO diuretics, check Cardiology plan.     Moncho Olson RN  April 8, 2022

## 2022-04-08 NOTE — PROGRESS NOTES
New onset atrial fib. Rate controlled in the 80's. No chest  Pain. Cardiology was called.  answered and orders received. EKG to be done.

## 2022-04-08 NOTE — PROGRESS NOTES
Physical Therapy    Facility/Department: [facility] MED SURG  Initial Assessment    NAME: Shraron Damian  : 1922  MRN: [id number]    Date of Service: 2022      Attending Provider:  Ann-Marie Frank MD    Evaluating PT:  Whit Brady, P.T. Room #:  6082/9778-D  Diagnosis:  Abnormal ECG [R94.31]  Acute respiratory failure with hypoxia (HCC) [J96.01]  Precautions:  Falls, bed/chair alarm      SUBJECTIVE:    Pt lives at [facility] and ambulated with ww PTA. OBJECTIVE:   Initial Evaluation  Date: 22 Treatment Short Term/ Long Term   Goals   Was pt agreeable to Eval/treatment? yes     Does pt have pain? No c/o pain     Bed Mobility  NA, pt was found and left sitting up in chair. supervision   Transfers Sit to stand: SBA  Stand to sit: SBA  Stand pivot: MIN A with ww  SBA   Ambulation   50 feet with ww MIN A  150 feet with ww SBA   Stair negotiation: ascended and descended NA  NA   AM-PAC 6 Clicks 34/69       BLE ROM is WFL. BLE strength is grossly 4-/5 to 4/5. Sensation:  Pt denies numbness and tingling to extremities  Balance: sitting is supervision and standing with ww is MIN A  Endurance: fair    Patient education  Pt educated on staying inside ww during amb    Patient response to education:   Pt verbalized understanding Pt demonstrated skill Pt requires further education in this area   yes yes yes     ASSESSMENT:    Conditions Requiring Skilled Therapeutic Intervention:    [x]Decreased strength     []Decreased ROM  [x]Decreased functional mobility  [x]Decreased balance   [x]Decreased endurance   [x]Decreased posture  []Decreased sensation  []Decreased coordination   []Decreased vision  []Decreased safety awareness   []Increased pain     Comments:  Pt was found sitting up in chair and was agreeable to PT. He walked with ww with slow gait speed and as he fatigued began to flex more in his knees and hips and got his body outside of ww MADDI and required MIN A to correct and safely get back to chair.   Pt appeared to become weak and fatigued with amb, but he denies that he was tired. Pt was left sitting up in chair with call light left by patient. Chair/bed alarm: chair alarm was activated. Pt's/ family goals   1. To go back to [facility]. Patient and or family understand(s) diagnosis, prognosis, and plan of care. PHYSICAL THERAPY PLAN OF CARE:    PT POC is established based on physician order and patient diagnosis     Referring provider/PT Order:  PT eval and treat  Diagnosis:  Abnormal ECG [R94.31]  Acute respiratory failure with hypoxia ([address] [J96.01]  Specific instructions for next treatment:  Increase amb distance. Current Treatment Recommendations:     [x] Strengthening to improve independence with functional mobility   [] ROM to improve ROM and decrease spasm and pain which will help promote independence with functional mobility   [x] Balance Training to improve static/dynamic balance and to reduce fall risk  [x] Endurance Training to improve activity tolerance during functional mobility   [x] Transfer Training to improve safety and independence with all functional transfers   [x] Gait Training to improve gait mechanics, endurance and assess need for appropriate assistive device  [] Stair Training in preparation for safe discharge home and/or into the community   [] Positioning to prevent skin breakdown and contractures  [x] Safety and Education Training   [] Patient/Caregiver Education   [] HEP  [] Other     PT long term treatment goals are located in above grid    Frequency of treatments: 2-5x/week x 1-2 weeks. Time in  12:10  Time out  12:25    Evaluation Time includes thorough review of current medical information, gathering information on past medical history/social history and prior level of function, completion of standardized testing/informal observation of tasks, assessment of data and education on plan of care and goals.     CPT codes:  [x] Low Complexity PT evaluation 19700  [] Moderate Complexity PT evaluation 84661  [] High Complexity PT evaluation [id number]  [] PT Re-evaluation [id number]  [] Gait training 91254 ** minutes  [] Manual therapy 39395 ** minutes  [] Therapeutic activities 81529 ** minutes  [] Therapeutic exercises 22595 ** minutes  [] Neuromuscular reeducation 21764 ** minutes     Socrates Crump., P.T.   License Number: [id number]

## 2022-04-08 NOTE — PROGRESS NOTES
[facility] CARDIOLOGY PROGRESS NOTE  [facility]        Subjective: Seeing patient for shortness of breath and dyspnea on exertion with known LVEF 40%, mild aortic valve stenosis and moderate pulmonary hypertension. Also has systemic hypertension,      Overnight developed new onset atrial fibrillation as demonstrated on telemetry and EKG. Was admitted to the hospital from [facility] with progressive shortness of breath and nonproductive cough. Today he sitting up in a chair at bedside and is remarkably appropriately interactive for a 80year-old gentleman. Objective: Medications lab chart and telemetry all reviewed. Patient Vitals for the past 24 hrs:   BP Temp Temp src Pulse Resp SpO2   04/08/22 0815 117/78 99.4 °F (37.4 °C) Oral 75 20 95 %   04/07/22 1924 121/87 99.2 °F (37.3 °C) Oral 96 20 99 %   04/07/22 1522 127/65 99.1 °F (37.3 °C) Oral 66 20 100 %         Intake/Output Summary (Last 24 hours) at 4/8/2022 1249  Last data filed at 4/8/2022 1104  Gross per 24 hour   Intake --   Output 400 ml   Net -400 ml       Wt Readings from Last 3 Encounters:   04/07/22 172 lb 6.4 oz (78.2 kg)   12/10/21 173 lb 4.8 oz (78.6 kg)   07/25/21 166 lb (75.3 kg)       Telemetry: Personally reviewed and shows new atrial fibrillation heart rate in the 80s. Current meds: Scheduled Meds:   aspirin  162 mg Oral Daily    carBAMazepine  200 mg Oral QAM AC    clopidogrel  75 mg Oral Daily    finasteride  5 mg Oral QAM AC    therapeutic multivitamin-minerals  1 tablet Oral QAM AC    pantoprazole  20 mg Oral QAM AC    tamsulosin  0.4 mg Oral Dinner    metoprolol succinate  25 mg Oral Q12H    furosemide  40 mg IntraVENous BID    ipratropium  0.5 mg Nebulization TID     Continuous Infusions:  PRN Meds:.nitroGLYCERIN    Allergies: Patient has no known allergies.       Labs:   Recent Labs     04/06/22  1700 04/07/22  0708 04/08/22  0537   WBC 6.0 5.5 5.1   HGB 11.4* 10.2* 11.0*   HCT 35.6* 32.2* 34.9*   .3* 100.3* 101.7*    165 167       Labs:   Recent Labs     04/06/22  1700 04/07/22  0708 04/08/22  0537    143 140   K 4.3 3.9 4.0   * 109* 104   CO2 24 26 27   BUN 22 22 25*   CREATININE 0.8 0.9 0.9       Labs: No results for input(s): CKTOTAL, CKMB, CKMBINDEX, TROPONINI in the last 72 hours. Labs: No results for input(s): BNP in the last 72 hours. Labs: No results for input(s): CHOL, HDL, TRIG in the last 72 hours. Invalid input(s): CHOLHDLR, LDLCALCU    Labs:   Recent Labs     04/06/22  1700   PROT 6.8   INR 1.2       Review of systems: No reported significant weight gain or weight loss. no dysuria or frequency, no dizziness, falls or trauma, no change in bowel or bladder habits, no hematochezia, hemoptysis or hematuria. No fevers, chills, nausea or vomiting reported. No significant wheezing or sputum production. No headache or visual changes. The remainder of the 10 review of systems otherwise negative. Exam      General: Patient comfortable in no distress and currently denies any chest pain. HEENT: Face symmetrical and no apparent cranial nerve deficit. Neck: No jugular venous distention, carotid bruit or thyromegaly. Lungs: Clear bilaterally without rales, wheezes or dullness. Cardiac: IRegular rate and rhythm, no S3, S4, no rub or gallop. Abdomen: No rebound or guarding, no hepatosplenomegaly. Extremities: Without significant clubbing , cyanosis, or edema. Neuro:  No focal motor or sensory deficit apparent. Skin: No petechiae, no significant bruising. Assessment: See plan below        Plan: #1 new onset of atrial fibrillation ventricular rate well controlled. Continue current aspirin 81 mg daily and has been on chronic clopidogrel 75 mg daily. No reports of a stroke. Would be a little hesitant to fully anticoagulate him. Continues on metoprolol succinate 25 mg twice a day and would continue this.     #2 acute on chronic systolic CHF with dyspnea on exertion and possibly having prior atrial fibrillation. Has diuresed 0.4 L over the last 24 hours which I suspect is inaccurate currently receiving Lasix 40 mg IV twice a day. Today creatinine 0.9 BUN at 25 which would reflect likely some degree of intravascular volume depletion. Prior to admission was on furosemide 40 mg p.o. daily. Now receiving 40 mg IV twice a day. Would switch to Bumex 2 mg p.o. daily starting today and discharge him on this. Stop furosemide. Change to Bumex 2 mg daily may help facilitate fluid mobilization and limit diuretic resistance. #3 hypertension blood pressure reasonable at this time. Systolic blood pressure in the 018-9 40 range diastolic in the 26Y and 36I over the last 24 hours. #4 moderate pulmonary hypertension with also demonstrated mild aortic valve stenosis. Follow-up April 6, 2022 chest x-ray showed cardiomegaly with probable pulmonary vascular congestive changes. Small right pleural effusion. Up to chair and use incentive spirometer every 30 minutes. Please call if any further questions or concerns. He can be seen back at [facility] cardiology outpatient 1 month.     Electronically signed by Tilmon Moritz, MD on 4/8/2022 at 12:49 PM

## 2022-04-09 LAB
ANION GAP SERPL CALCULATED.3IONS-SCNC: 8 MMOL/L (ref 7–16)
BASOPHILS ABSOLUTE: 0.02 E9/L (ref 0–0.2)
BASOPHILS RELATIVE PERCENT: 0.4 % (ref 0–2)
BUN BLDV-MCNC: 30 MG/DL (ref 6–23)
CALCIUM SERPL-MCNC: 9 MG/DL (ref 8.6–10.2)
CHLORIDE BLD-SCNC: 104 MMOL/L (ref 98–107)
CO2: 29 MMOL/L (ref 22–29)
CREAT SERPL-MCNC: 0.9 MG/DL (ref 0.7–1.2)
EOSINOPHILS ABSOLUTE: 0.11 E9/L (ref 0.05–0.5)
EOSINOPHILS RELATIVE PERCENT: 2.1 % (ref 0–6)
GFR AFRICAN AMERICAN: >60
GFR NON-AFRICAN AMERICAN: >60 ML/MIN/1.73
GLUCOSE BLD-MCNC: 103 MG/DL (ref 74–99)
HCT VFR BLD CALC: 33.7 % (ref 37–54)
HEMOGLOBIN: 11 G/DL (ref 12.5–16.5)
IMMATURE GRANULOCYTES #: 0.03 E9/L
IMMATURE GRANULOCYTES %: 0.6 % (ref 0–5)
LYMPHOCYTES ABSOLUTE: 1.47 E9/L (ref 1.5–4)
LYMPHOCYTES RELATIVE PERCENT: 27.6 % (ref 20–42)
MCH RBC QN AUTO: 32.1 PG (ref 26–35)
MCHC RBC AUTO-ENTMCNC: 32.6 % (ref 32–34.5)
MCV RBC AUTO: 98.3 FL (ref 80–99.9)
MONOCYTES ABSOLUTE: 0.6 E9/L (ref 0.1–0.95)
MONOCYTES RELATIVE PERCENT: 11.3 % (ref 2–12)
NEUTROPHILS ABSOLUTE: 3.1 E9/L (ref 1.8–7.3)
NEUTROPHILS RELATIVE PERCENT: 58 % (ref 43–80)
PDW BLD-RTO: 13.9 FL (ref 11.5–15)
PLATELET # BLD: 167 E9/L (ref 130–450)
PMV BLD AUTO: 9.4 FL (ref 7–12)
POTASSIUM SERPL-SCNC: 3.9 MMOL/L (ref 3.5–5)
RBC # BLD: 3.43 E12/L (ref 3.8–5.8)
SODIUM BLD-SCNC: 141 MMOL/L (ref 132–146)
WBC # BLD: 5.3 E9/L (ref 4.5–11.5)

## 2022-04-09 PROCEDURE — 2700000000 HC OXYGEN THERAPY PER DAY

## 2022-04-09 PROCEDURE — 6360000002 HC RX W HCPCS: Performed by: INTERNAL MEDICINE

## 2022-04-09 PROCEDURE — 80048 BASIC METABOLIC PNL TOTAL CA: CPT

## 2022-04-09 PROCEDURE — 6370000000 HC RX 637 (ALT 250 FOR IP): Performed by: INTERNAL MEDICINE

## 2022-04-09 PROCEDURE — G0378 HOSPITAL OBSERVATION PER HR: HCPCS

## 2022-04-09 PROCEDURE — 94640 AIRWAY INHALATION TREATMENT: CPT

## 2022-04-09 PROCEDURE — 85025 COMPLETE CBC W/AUTO DIFF WBC: CPT

## 2022-04-09 PROCEDURE — 36415 COLL VENOUS BLD VENIPUNCTURE: CPT

## 2022-04-09 RX ADMIN — TAMSULOSIN HYDROCHLORIDE 0.4 MG: 0.4 CAPSULE ORAL at 17:20

## 2022-04-09 RX ADMIN — METOPROLOL SUCCINATE 25 MG: 25 TABLET, EXTENDED RELEASE ORAL at 09:47

## 2022-04-09 RX ADMIN — IPRATROPIUM BROMIDE 0.5 MG: 0.5 SOLUTION RESPIRATORY (INHALATION) at 08:02

## 2022-04-09 RX ADMIN — MULTIPLE VITAMINS W/ MINERALS TAB 1 TABLET: TAB at 05:46

## 2022-04-09 RX ADMIN — IPRATROPIUM BROMIDE 0.5 MG: 0.5 SOLUTION RESPIRATORY (INHALATION) at 19:21

## 2022-04-09 RX ADMIN — METOPROLOL SUCCINATE 25 MG: 25 TABLET, EXTENDED RELEASE ORAL at 20:22

## 2022-04-09 RX ADMIN — FINASTERIDE 5 MG: 5 TABLET, FILM COATED ORAL at 05:46

## 2022-04-09 RX ADMIN — IPRATROPIUM BROMIDE 0.5 MG: 0.5 SOLUTION RESPIRATORY (INHALATION) at 12:28

## 2022-04-09 RX ADMIN — CARBAMAZEPINE 200 MG: 200 TABLET ORAL at 05:46

## 2022-04-09 RX ADMIN — PANTOPRAZOLE SODIUM 20 MG: 20 TABLET, DELAYED RELEASE ORAL at 05:46

## 2022-04-09 RX ADMIN — BUMETANIDE 2 MG: 1 TABLET ORAL at 09:47

## 2022-04-09 RX ADMIN — ASPIRIN 162 MG: 81 TABLET, CHEWABLE ORAL at 09:47

## 2022-04-09 RX ADMIN — CLOPIDOGREL BISULFATE 75 MG: 75 TABLET ORAL at 09:47

## 2022-04-09 NOTE — PROGRESS NOTES
Internal Medicine  Progress Note  Gina Oh MD     Subjective:     Patient is alert. Patient reports OK. Tolerating diet well no other c/o. Scheduled Meds:   bumetanide  2 mg Oral Daily    aspirin  162 mg Oral Daily    carBAMazepine  200 mg Oral QAM AC    clopidogrel  75 mg Oral Daily    finasteride  5 mg Oral QAM AC    therapeutic multivitamin-minerals  1 tablet Oral QAM AC    pantoprazole  20 mg Oral QAM AC    tamsulosin  0.4 mg Oral Dinner    metoprolol succinate  25 mg Oral Q12H    ipratropium  0.5 mg Nebulization TID     Continuous Infusions:  PRN Meds:nitroGLYCERIN    Objective:      Physical Exam:  Vitals:   /71   Pulse 79   Temp 98.2 °F (36.8 °C) (Oral)   Resp 20   Ht 6' (1.829 m)   Wt 176 lb 14.4 oz (80.2 kg)   SpO2 96%   BMI 23.99 kg/m²   Orthostatic BPs and Heart Rates:     I/O's    Intake/Output Summary (Last 24 hours) at 4/9/2022 0751  Last data filed at 4/9/2022 0525  Gross per 24 hour   Intake --   Output 550 ml   Net -550 ml     Exam:  General appearance: alert, appears stated age and cooperative  Head: Normocephalic, without obvious abnormality, atraumatic  Eyes: conjunctivae/corneas clear. PERRL, EOM's intact. Fundi benign. Throat: lips, mucosa, and tongue normal; teeth and gums normal  Neck: no adenopathy, no carotid bruit, no JVD, supple, symmetrical, trachea midline and thyroid not enlarged, symmetric, no tenderness/mass/nodules  Back: symmetric, no curvature. ROM normal. No CVA tenderness.   Lungs: rales bilaterally and rhonchi bilaterally  Chest wall: no tenderness  Heart: irregularly irregular rhythm  Abdomen: soft, non-tender; bowel sounds normal; no masses,  no organomegaly  Extremities: extremities normal, atraumatic, no cyanosis or edema  Pulses: 2+ and symmetric  Skin: Skin color, texture, turgor normal. No rashes or lesions  Lymph nodes: Cervical, supraclavicular, and axillary nodes normal.  Neurologic: Grossly normal      Imaging     Chest Xray:  Results for orders placed during the hospital encounter of 03/06/21    XR CHEST (2 VW)    Narrative  EXAMINATION:  TWO XRAY VIEWS OF THE CHEST    3/6/2021 11:31 am    COMPARISON:  08/03/2020 and 05/14/2019    HISTORY:  ORDERING SYSTEM PROVIDED HISTORY: fall  TECHNOLOGIST PROVIDED HISTORY:  Reason for exam:->fall    FINDINGS:  There is a small right pleural effusion. Strandy densities seen at both lung  bases, greater on the right, most likely atelectasis. No definite acute  infiltrate is seen. There is no evidence of left effusion. Lungs are  somewhat hyperinflated, suggestive of COPD. There is mild cardiomegaly. The  heart and mediastinum are not significantly changed and there is no evidence  of vascular congestion. No pneumothorax seen. There is degenerative change in the thoracic spine. Mild compression  deformity at about T10 is noted. This is of indeterminate age but was not  present on 05/14/2019. Impression  1. Small right pleural effusion. Mild bibasilar atelectasis, greater on the  right. 2. Mild cardiomegaly. 3. No pneumothorax. Probable COPD. 4. Mild compression deformity of T10, of indeterminate age but new compared  to May 2019. If clinically indicated, CT of the thoracic spine could be done  for further evaluation. Results for orders placed during the hospital encounter of 04/06/22    XR CHEST PORTABLE    Narrative  EXAMINATION:  ONE XRAY VIEW OF THE CHEST    4/6/2022 4:25 pm    COMPARISON:  12/08/2021. HISTORY:  ORDERING SYSTEM PROVIDED HISTORY: cough and congestion  TECHNOLOGIST PROVIDED HISTORY:  Reason for exam:->cough and congestion    FINDINGS:  Probable small right pleural effusion. Pneumothorax. Cardiomegaly with  pulmonary vascular congestive changes. The osseous structures are without  acute process. Impression  Cardiomegaly with probable pulmonary vascular congestive changes. Probable small right pleural effusion.       Additional Imaging:  none    Lab Review   Recent Results (from the past 24 hour(s))   Basic Metabolic Panel    Collection Time: 04/09/22  6:32 AM   Result Value Ref Range    Sodium 141 132 - 146 mmol/L    Potassium 3.9 3.5 - 5.0 mmol/L    Chloride 104 98 - 107 mmol/L    CO2 29 22 - 29 mmol/L    Anion Gap 8 7 - 16 mmol/L    Glucose 103 (H) 74 - 99 mg/dL    BUN 30 (H) 6 - 23 mg/dL    CREATININE 0.9 0.7 - 1.2 mg/dL    GFR Non-African American >60 >=60 mL/min/1.73    GFR African American >60     Calcium 9.0 8.6 - 10.2 mg/dL   CBC with Auto Differential    Collection Time: 04/09/22  6:32 AM   Result Value Ref Range    WBC 5.3 4.5 - 11.5 E9/L    RBC 3.43 (L) 3.80 - 5.80 E12/L    Hemoglobin 11.0 (L) 12.5 - 16.5 g/dL    Hematocrit 33.7 (L) 37.0 - 54.0 %    MCV 98.3 80.0 - 99.9 fL    MCH 32.1 26.0 - 35.0 pg    MCHC 32.6 32.0 - 34.5 %    RDW 13.9 11.5 - 15.0 fL    Platelets 681 527 - 284 E9/L    MPV 9.4 7.0 - 12.0 fL    Neutrophils % 58.0 43.0 - 80.0 %    Immature Granulocytes % 0.6 0.0 - 5.0 %    Lymphocytes % 27.6 20.0 - 42.0 %    Monocytes % 11.3 2.0 - 12.0 %    Eosinophils % 2.1 0.0 - 6.0 %    Basophils % 0.4 0.0 - 2.0 %    Neutrophils Absolute 3.10 1.80 - 7.30 E9/L    Immature Granulocytes # 0.03 E9/L    Lymphocytes Absolute 1.47 (L) 1.50 - 4.00 E9/L    Monocytes Absolute 0.60 0.10 - 0.95 E9/L    Eosinophils Absolute 0.11 0.05 - 0.50 E9/L    Basophils Absolute 0.02 0.00 - 0.20 E9/L     Assessment:     Principal Problem:    Acute respiratory failure with hypoxia (HCC)  Active Problems:    Essential hypertension    Seizure disorder (HCC)    PVD (peripheral vascular disease) (HCC)    CHF (congestive heart failure) ([facility])  Resolved Problems:    * No resolved hospital problems.  *      Plan:   Not sure who would have ever given an order for OBS -  Can guarantee it was not me why anyone would think a 89yo in [place] CHF would be an obs candidate is beyond me    Change to Bumex as per Dr Murillo Neighbor reccomendation  Maisha Nails, MD  4/9/2022  7:53 AM

## 2022-04-10 LAB
ANION GAP SERPL CALCULATED.3IONS-SCNC: 9 MMOL/L (ref 7–16)
BASOPHILS ABSOLUTE: 0.01 E9/L (ref 0–0.2)
BASOPHILS RELATIVE PERCENT: 0.2 % (ref 0–2)
BUN BLDV-MCNC: 31 MG/DL (ref 6–23)
CALCIUM SERPL-MCNC: 9 MG/DL (ref 8.6–10.2)
CHLORIDE BLD-SCNC: 103 MMOL/L (ref 98–107)
CO2: 27 MMOL/L (ref 22–29)
CREAT SERPL-MCNC: 1 MG/DL (ref 0.7–1.2)
EKG ATRIAL RATE: 288 BPM
EKG Q-T INTERVAL: 360 MS
EKG QRS DURATION: 94 MS
EKG QTC CALCULATION (BAZETT): 438 MS
EKG R AXIS: -15 DEGREES
EKG T AXIS: 145 DEGREES
EKG VENTRICULAR RATE: 89 BPM
EOSINOPHILS ABSOLUTE: 0.09 E9/L (ref 0.05–0.5)
EOSINOPHILS RELATIVE PERCENT: 1.5 % (ref 0–6)
GFR AFRICAN AMERICAN: >60
GFR NON-AFRICAN AMERICAN: >60 ML/MIN/1.73
GLUCOSE BLD-MCNC: 108 MG/DL (ref 74–99)
HCT VFR BLD CALC: 31.8 % (ref 37–54)
HEMOGLOBIN: 10.3 G/DL (ref 12.5–16.5)
IMMATURE GRANULOCYTES #: 0.01 E9/L
IMMATURE GRANULOCYTES %: 0.2 % (ref 0–5)
LYMPHOCYTES ABSOLUTE: 1.6 E9/L (ref 1.5–4)
LYMPHOCYTES RELATIVE PERCENT: 27 % (ref 20–42)
MCH RBC QN AUTO: 32.1 PG (ref 26–35)
MCHC RBC AUTO-ENTMCNC: 32.4 % (ref 32–34.5)
MCV RBC AUTO: 99.1 FL (ref 80–99.9)
MONOCYTES ABSOLUTE: 0.58 E9/L (ref 0.1–0.95)
MONOCYTES RELATIVE PERCENT: 9.8 % (ref 2–12)
NEUTROPHILS ABSOLUTE: 3.64 E9/L (ref 1.8–7.3)
NEUTROPHILS RELATIVE PERCENT: 61.3 % (ref 43–80)
PDW BLD-RTO: 13.8 FL (ref 11.5–15)
PLATELET # BLD: 158 E9/L (ref 130–450)
PMV BLD AUTO: 9.3 FL (ref 7–12)
POTASSIUM SERPL-SCNC: 3.8 MMOL/L (ref 3.5–5)
RBC # BLD: 3.21 E12/L (ref 3.8–5.8)
SODIUM BLD-SCNC: 139 MMOL/L (ref 132–146)
WBC # BLD: 5.9 E9/L (ref 4.5–11.5)

## 2022-04-10 PROCEDURE — 94640 AIRWAY INHALATION TREATMENT: CPT

## 2022-04-10 PROCEDURE — 36415 COLL VENOUS BLD VENIPUNCTURE: CPT

## 2022-04-10 PROCEDURE — G0378 HOSPITAL OBSERVATION PER HR: HCPCS

## 2022-04-10 PROCEDURE — 85025 COMPLETE CBC W/AUTO DIFF WBC: CPT

## 2022-04-10 PROCEDURE — 6370000000 HC RX 637 (ALT 250 FOR IP): Performed by: INTERNAL MEDICINE

## 2022-04-10 PROCEDURE — 2700000000 HC OXYGEN THERAPY PER DAY

## 2022-04-10 PROCEDURE — 80048 BASIC METABOLIC PNL TOTAL CA: CPT

## 2022-04-10 PROCEDURE — 96376 TX/PRO/DX INJ SAME DRUG ADON: CPT

## 2022-04-10 PROCEDURE — 6360000002 HC RX W HCPCS: Performed by: INTERNAL MEDICINE

## 2022-04-10 RX ORDER — FUROSEMIDE 10 MG/ML
40 INJECTION INTRAMUSCULAR; INTRAVENOUS ONCE
Status: COMPLETED | OUTPATIENT
Start: 2022-04-10 | End: 2022-04-10

## 2022-04-10 RX ORDER — FUROSEMIDE 10 MG/ML
INJECTION INTRAMUSCULAR; INTRAVENOUS
Status: DISPENSED
Start: 2022-04-10 | End: 2022-04-10

## 2022-04-10 RX ADMIN — BUMETANIDE 2 MG: 1 TABLET ORAL at 10:24

## 2022-04-10 RX ADMIN — MULTIPLE VITAMINS W/ MINERALS TAB 1 TABLET: TAB at 06:23

## 2022-04-10 RX ADMIN — METOPROLOL SUCCINATE 25 MG: 25 TABLET, EXTENDED RELEASE ORAL at 21:45

## 2022-04-10 RX ADMIN — FUROSEMIDE 40 MG: 10 INJECTION, SOLUTION INTRAMUSCULAR; INTRAVENOUS at 08:41

## 2022-04-10 RX ADMIN — TAMSULOSIN HYDROCHLORIDE 0.4 MG: 0.4 CAPSULE ORAL at 17:08

## 2022-04-10 RX ADMIN — METOPROLOL SUCCINATE 25 MG: 25 TABLET, EXTENDED RELEASE ORAL at 10:24

## 2022-04-10 RX ADMIN — IPRATROPIUM BROMIDE 0.5 MG: 0.5 SOLUTION RESPIRATORY (INHALATION) at 08:19

## 2022-04-10 RX ADMIN — IPRATROPIUM BROMIDE 0.5 MG: 0.5 SOLUTION RESPIRATORY (INHALATION) at 12:54

## 2022-04-10 RX ADMIN — ASPIRIN 162 MG: 81 TABLET, CHEWABLE ORAL at 10:24

## 2022-04-10 RX ADMIN — CLOPIDOGREL BISULFATE 75 MG: 75 TABLET ORAL at 10:24

## 2022-04-10 RX ADMIN — CARBAMAZEPINE 200 MG: 200 TABLET ORAL at 06:23

## 2022-04-10 RX ADMIN — FINASTERIDE 5 MG: 5 TABLET, FILM COATED ORAL at 06:23

## 2022-04-10 RX ADMIN — PANTOPRAZOLE SODIUM 20 MG: 20 TABLET, DELAYED RELEASE ORAL at 06:23

## 2022-04-10 ASSESSMENT — PAIN SCALES - GENERAL
PAINLEVEL_OUTOF10: 0
PAINLEVEL_OUTOF10: 0

## 2022-04-10 NOTE — PROGRESS NOTES
Internal Medicine  Progress Note  Shaina Dillard MD     Subjective:     Patient is alert. Patient reports OK. Tolerating diet well no other c/o.     Scheduled Meds:   bumetanide  2 mg Oral Daily    aspirin  162 mg Oral Daily    carBAMazepine  200 mg Oral QAM AC    clopidogrel  75 mg Oral Daily    finasteride  5 mg Oral QAM AC    therapeutic multivitamin-minerals  1 tablet Oral QAM AC    pantoprazole  20 mg Oral QAM AC    tamsulosin  0.4 mg Oral Dinner    metoprolol succinate  25 mg Oral Q12H    ipratropium  0.5 mg Nebulization TID     Continuous Infusions:  PRN Meds:nitroGLYCERIN    Objective:      Physical Exam:  Vitals:   /67   Pulse 97   Temp 97.7 °F (36.5 °C) (Oral)   Resp 16   Ht 6' (1.829 m)   Wt 171 lb 4.8 oz (77.7 kg)   SpO2 100%   BMI 23.23 kg/m²   Orthostatic BPs and Heart Rates:     I/O's  No intake or output data in the 24 hours ending 04/10/22 7583  Exam:  General appearance: alert, appears stated age and cooperative  Head: Normocephalic, without obvious abnormality, atraumatic  Eyes: negative  Throat: normal findings: lips normal without lesions  Neck: no adenopathy, no carotid bruit, no JVD and supple, symmetrical, trachea midline  Back: negative  Lungs: clear to auscultation bilaterally  Chest wall: no tenderness  Heart: regular rate and rhythm  Abdomen: soft, non-tender; bowel sounds normal; no masses,  no organomegaly  Extremities: extremities normal, atraumatic, no cyanosis or edema  Pulses: 2+ and symmetric  Skin: Skin color, texture, turgor normal. No rashes or lesions  Lymph nodes: Cervical, supraclavicular, and axillary nodes normal.  Neurologic: Grossly normal      Imaging     Chest  Xray:  Results for orders placed during the hospital encounter of 03/06/21    XR CHEST (2 VW)    Narrative  EXAMINATION:  TWO XRAY VIEWS OF THE CHEST    3/6/2021 11:31 am    COMPARISON:  08/03/2020 and 05/14/2019    HISTORY:  ORDERING SYSTEM PROVIDED HISTORY: fall  TECHNOLOGIST PROVIDED HISTORY:  Reason for exam:->fall    FINDINGS:  There is a small right pleural effusion. Strandy densities seen at both lung  bases, greater on the right, most likely atelectasis. No definite acute  infiltrate is seen. There is no evidence of left effusion. Lungs are  somewhat hyperinflated, suggestive of COPD. There is mild cardiomegaly. The  heart and mediastinum are not significantly changed and there is no evidence  of vascular congestion. No pneumothorax seen. There is degenerative change in the thoracic spine. Mild compression  deformity at about T10 is noted. This is of indeterminate age but was not  present on 05/14/2019. Impression  1. Small right pleural effusion. Mild bibasilar atelectasis, greater on the  right. 2. Mild cardiomegaly. 3. No pneumothorax. Probable COPD. 4. Mild compression deformity of T10, of indeterminate age but new compared  to May 2019. If clinically indicated, CT of the thoracic spine could be done  for further evaluation. Results for orders placed during the hospital encounter of 04/06/22    XR CHEST PORTABLE    Narrative  EXAMINATION:  ONE XRAY VIEW OF THE CHEST    4/6/2022 4:25 pm    COMPARISON:  12/08/2021. HISTORY:  ORDERING SYSTEM PROVIDED HISTORY: cough and congestion  TECHNOLOGIST PROVIDED HISTORY:  Reason for exam:->cough and congestion    FINDINGS:  Probable small right pleural effusion. Pneumothorax. Cardiomegaly with  pulmonary vascular congestive changes. The osseous structures are without  acute process. Impression  Cardiomegaly with probable pulmonary vascular congestive changes. Probable small right pleural effusion.       Additional Imaging:  deferred    Lab Review   Recent Results (from the past 24 hour(s))   Basic Metabolic Panel    Collection Time: 04/10/22  4:38 AM   Result Value Ref Range    Sodium 139 132 - 146 mmol/L    Potassium 3.8 3.5 - 5.0 mmol/L    Chloride 103 98 - 107 mmol/L    CO2 27 22 - 29 mmol/L    Anion Gap 9 7 - 16 mmol/L    Glucose 108 (H) 74 - 99 mg/dL    BUN 31 (H) 6 - 23 mg/dL    CREATININE 1.0 0.7 - 1.2 mg/dL    GFR Non-African American >60 >=60 mL/min/1.73    GFR African American >60     Calcium 9.0 8.6 - 10.2 mg/dL   CBC with Auto Differential    Collection Time: 04/10/22  4:38 AM   Result Value Ref Range    WBC 5.9 4.5 - 11.5 E9/L    RBC 3.21 (L) 3.80 - 5.80 E12/L    Hemoglobin 10.3 (L) 12.5 - 16.5 g/dL    Hematocrit 31.8 (L) 37.0 - 54.0 %    MCV 99.1 80.0 - 99.9 fL    MCH 32.1 26.0 - 35.0 pg    MCHC 32.4 32.0 - 34.5 %    RDW 13.8 11.5 - 15.0 fL    Platelets 106 119 - 770 E9/L    MPV 9.3 7.0 - 12.0 fL    Neutrophils % 61.3 43.0 - 80.0 %    Immature Granulocytes % 0.2 0.0 - 5.0 %    Lymphocytes % 27.0 20.0 - 42.0 %    Monocytes % 9.8 2.0 - 12.0 %    Eosinophils % 1.5 0.0 - 6.0 %    Basophils % 0.2 0.0 - 2.0 %    Neutrophils Absolute 3.64 1.80 - 7.30 E9/L    Immature Granulocytes # 0.01 E9/L    Lymphocytes Absolute 1.60 1.50 - 4.00 E9/L    Monocytes Absolute 0.58 0.10 - 0.95 E9/L    Eosinophils Absolute 0.09 0.05 - 0.50 E9/L    Basophils Absolute 0.01 0.00 - 0.20 E9/L     Assessment:     Principal Problem:    Acute respiratory failure with hypoxia (HCC)  Active Problems:    Essential hypertension    Seizure disorder (HCC)    PVD (peripheral vascular disease) (HCC)    CHF (congestive heart failure) (HCC)  Resolved Problems:    * No resolved hospital problems.  *      Plan:   Home when OK with cardio  Sarah Levine MD  4/10/2022  7:27 AM

## 2022-04-11 VITALS
HEIGHT: 72 IN | HEART RATE: 87 BPM | OXYGEN SATURATION: 100 % | TEMPERATURE: 98.2 F | WEIGHT: 171.3 LBS | BODY MASS INDEX: 23.2 KG/M2 | RESPIRATION RATE: 20 BRPM | SYSTOLIC BLOOD PRESSURE: 121 MMHG | DIASTOLIC BLOOD PRESSURE: 70 MMHG

## 2022-04-11 LAB
ANION GAP SERPL CALCULATED.3IONS-SCNC: 10 MMOL/L (ref 7–16)
BASOPHILS ABSOLUTE: 0.02 E9/L (ref 0–0.2)
BASOPHILS RELATIVE PERCENT: 0.3 % (ref 0–2)
BLOOD CULTURE, ROUTINE: NORMAL
BUN BLDV-MCNC: 29 MG/DL (ref 6–23)
CALCIUM SERPL-MCNC: 9.3 MG/DL (ref 8.6–10.2)
CHLORIDE BLD-SCNC: 105 MMOL/L (ref 98–107)
CO2: 27 MMOL/L (ref 22–29)
CREAT SERPL-MCNC: 0.9 MG/DL (ref 0.7–1.2)
CULTURE, BLOOD 2: NORMAL
EOSINOPHILS ABSOLUTE: 0.1 E9/L (ref 0.05–0.5)
EOSINOPHILS RELATIVE PERCENT: 1.5 % (ref 0–6)
GFR AFRICAN AMERICAN: >60
GFR NON-AFRICAN AMERICAN: >60 ML/MIN/1.73
GLUCOSE BLD-MCNC: 117 MG/DL (ref 74–99)
HCT VFR BLD CALC: 34.4 % (ref 37–54)
HEMOGLOBIN: 11.3 G/DL (ref 12.5–16.5)
IMMATURE GRANULOCYTES #: 0.02 E9/L
IMMATURE GRANULOCYTES %: 0.3 % (ref 0–5)
LYMPHOCYTES ABSOLUTE: 1.76 E9/L (ref 1.5–4)
LYMPHOCYTES RELATIVE PERCENT: 25.6 % (ref 20–42)
MCH RBC QN AUTO: 32.7 PG (ref 26–35)
MCHC RBC AUTO-ENTMCNC: 32.8 % (ref 32–34.5)
MCV RBC AUTO: 99.4 FL (ref 80–99.9)
MONOCYTES ABSOLUTE: 0.62 E9/L (ref 0.1–0.95)
MONOCYTES RELATIVE PERCENT: 9 % (ref 2–12)
NEUTROPHILS ABSOLUTE: 4.35 E9/L (ref 1.8–7.3)
NEUTROPHILS RELATIVE PERCENT: 63.3 % (ref 43–80)
PDW BLD-RTO: 13.9 FL (ref 11.5–15)
PLATELET # BLD: 169 E9/L (ref 130–450)
PMV BLD AUTO: 9.6 FL (ref 7–12)
POTASSIUM SERPL-SCNC: 3.9 MMOL/L (ref 3.5–5)
RBC # BLD: 3.46 E12/L (ref 3.8–5.8)
SARS-COV-2, NAAT: NOT DETECTED
SODIUM BLD-SCNC: 142 MMOL/L (ref 132–146)
WBC # BLD: 6.9 E9/L (ref 4.5–11.5)

## 2022-04-11 PROCEDURE — 96376 TX/PRO/DX INJ SAME DRUG ADON: CPT

## 2022-04-11 PROCEDURE — G0378 HOSPITAL OBSERVATION PER HR: HCPCS

## 2022-04-11 PROCEDURE — 6360000002 HC RX W HCPCS: Performed by: INTERNAL MEDICINE

## 2022-04-11 PROCEDURE — 36415 COLL VENOUS BLD VENIPUNCTURE: CPT

## 2022-04-11 PROCEDURE — 6370000000 HC RX 637 (ALT 250 FOR IP): Performed by: INTERNAL MEDICINE

## 2022-04-11 PROCEDURE — 80048 BASIC METABOLIC PNL TOTAL CA: CPT

## 2022-04-11 PROCEDURE — 85025 COMPLETE CBC W/AUTO DIFF WBC: CPT

## 2022-04-11 PROCEDURE — 87635 SARS-COV-2 COVID-19 AMP PRB: CPT

## 2022-04-11 PROCEDURE — 94640 AIRWAY INHALATION TREATMENT: CPT

## 2022-04-11 PROCEDURE — 2700000000 HC OXYGEN THERAPY PER DAY

## 2022-04-11 PROCEDURE — 97535 SELF CARE MNGMENT TRAINING: CPT

## 2022-04-11 RX ORDER — METOPROLOL SUCCINATE 25 MG/1
25 TABLET, EXTENDED RELEASE ORAL EVERY 12 HOURS
Qty: 30 TABLET | Refills: 3 | Status: SHIPPED | OUTPATIENT
Start: 2022-04-11

## 2022-04-11 RX ORDER — FUROSEMIDE 10 MG/ML
40 INJECTION INTRAMUSCULAR; INTRAVENOUS ONCE
Status: COMPLETED | OUTPATIENT
Start: 2022-04-11 | End: 2022-04-11

## 2022-04-11 RX ORDER — GUAIFENESIN 100 MG/5ML
200 SOLUTION ORAL EVERY 4 HOURS
Qty: 840 ML | Refills: 1 | Status: SHIPPED | OUTPATIENT
Start: 2022-04-11

## 2022-04-11 RX ORDER — GUAIFENESIN 100 MG/5ML
200 SOLUTION ORAL EVERY 4 HOURS
Status: DISCONTINUED | OUTPATIENT
Start: 2022-04-11 | End: 2022-04-11 | Stop reason: HOSPADM

## 2022-04-11 RX ORDER — BUMETANIDE 2 MG/1
2 TABLET ORAL DAILY
Qty: 30 TABLET | Refills: 3 | Status: SHIPPED | OUTPATIENT
Start: 2022-04-12

## 2022-04-11 RX ADMIN — GUAIFENESIN 200 MG: 200 SOLUTION ORAL at 17:27

## 2022-04-11 RX ADMIN — GUAIFENESIN 200 MG: 200 SOLUTION ORAL at 06:25

## 2022-04-11 RX ADMIN — IPRATROPIUM BROMIDE 0.5 MG: 0.5 SOLUTION RESPIRATORY (INHALATION) at 07:55

## 2022-04-11 RX ADMIN — GUAIFENESIN 200 MG: 200 SOLUTION ORAL at 15:05

## 2022-04-11 RX ADMIN — CLOPIDOGREL BISULFATE 75 MG: 75 TABLET ORAL at 07:49

## 2022-04-11 RX ADMIN — BUMETANIDE 2 MG: 1 TABLET ORAL at 07:50

## 2022-04-11 RX ADMIN — MULTIPLE VITAMINS W/ MINERALS TAB 1 TABLET: TAB at 05:48

## 2022-04-11 RX ADMIN — METOPROLOL SUCCINATE 25 MG: 25 TABLET, EXTENDED RELEASE ORAL at 10:15

## 2022-04-11 RX ADMIN — CARBAMAZEPINE 200 MG: 200 TABLET ORAL at 05:47

## 2022-04-11 RX ADMIN — GUAIFENESIN 200 MG: 200 SOLUTION ORAL at 10:16

## 2022-04-11 RX ADMIN — TAMSULOSIN HYDROCHLORIDE 0.4 MG: 0.4 CAPSULE ORAL at 17:28

## 2022-04-11 RX ADMIN — FUROSEMIDE 40 MG: 10 INJECTION, SOLUTION INTRAMUSCULAR; INTRAVENOUS at 04:04

## 2022-04-11 RX ADMIN — FINASTERIDE 5 MG: 5 TABLET, FILM COATED ORAL at 05:48

## 2022-04-11 RX ADMIN — ASPIRIN 162 MG: 81 TABLET, CHEWABLE ORAL at 07:49

## 2022-04-11 RX ADMIN — PANTOPRAZOLE SODIUM 20 MG: 20 TABLET, DELAYED RELEASE ORAL at 05:48

## 2022-04-11 ASSESSMENT — PAIN SCALES - GENERAL
PAINLEVEL_OUTOF10: 0

## 2022-04-11 NOTE — CARE COORDINATION
Social work / Discharge planning:       Social work spoke to the liaison for Phil Soares at [facility] and confirmed that patient can return to [place]. AM PAC 17/24. Patient is currently on oxygen. The liaison updated social work that patient does have oxygen concentrator for use at  but no portable tanks. RN updated and will be doing pulse ox testing.    Electronically signed by ELENITA Montague on 4/11/2022 at 11:06 AM

## 2022-04-11 NOTE — PROGRESS NOTES
Called the \"[facility]", report given to [facility] LPN. Voiced understanding. Son aware.

## 2022-04-11 NOTE — DISCHARGE INSTR - COC
Continuity of Care Form    Patient Name: Clarisse Frank   :  1922  MRN:  [id number]    Admit date:  2022  Discharge date:  22    Code Status Order: Prior   Advance Directives:      Admitting Physician:  Nicole Sorto MD  PCP: Gerald Roland MD    Discharging Nurse: [facility] Unit/Room#: 6440/8819-V  Discharging Unit Phone Number: [phone]    Emergency Contact:   Extended Emergency Contact Information  Primary Emergency Contact: [address], [address]  Home Phone: [phone]  Mobile Phone: [phone]  Relation: Child   needed? No  [facility] Emergency [facility] [address] Phone: [phone]  Mobile Phone: [phone]  Relation: Other   needed? No    Past Surgical History:  Past Surgical History:   Procedure Laterality Date    HERNIA REPAIR      HIP SURGERY Right 2017    ORIF right hip  Dr. Dayan Negrete       Immunization History:   Immunization History   Administered Date(s) Administered    Tdap (Boostrix, Adacel) 2021       Active Problems:  Patient Active Problem List   Diagnosis Code    Syncope and collapse R55    Essential hypertension I10    Seizure disorder ([facility] [address] G40.909    Trauma T14.90XA    Closed fracture of multiple ribs of left side S22.42XA    Hemopneumothorax on left J94.2    Closed fracture of left scapula S42.102A    Multiple fractures T07. XXXA    Onychomycosis B35.1    Tinea pedis of both feet B35.3    PVD (peripheral vascular disease) ([facility]) I73.9    Dermatitis L30.9    Acute combined systolic and diastolic CHF, NYHA class 1 ([facility]) I50.41    Non-STEMI (non-ST elevated myocardial infarction) ([facility]) I21.4    First degree AV block I44.0    CHF (congestive heart failure) ([facility]) I50.9    Acute respiratory failure with hypoxia ([facility]) J96.01       Isolation/Infection:   Isolation            No Isolation          Patient Infection Status       Infection Onset Added Last Indicated Last Indicated By Review Planned Expiration Resolved Resolved By    None active    Resolved    COVID-19 (Rule Out) 04/06/22 04/06/22 04/06/22 COVID-19, Rapid (Ordered)   04/06/22 Rule-Out Test Resulted    COVID-19 (Rule Out) 12/08/21 12/08/21 12/08/21 COVID-19, Rapid (Ordered)   12/08/21 Rule-Out Test Resulted            Nurse Assessment:  Last Vital Signs: /70   Pulse 87   Temp 98.2 °F (36.8 °C) (Oral)   Resp 20   Ht 6' (1.829 m)   Wt 171 lb 4.8 oz (77.7 kg)   SpO2 100%   BMI 23.23 kg/m²     Last documented pain score (0-10 scale): Pain Level: 0  Last Weight:   Wt Readings from Last 1 Encounters:   04/10/22 171 lb 4.8 oz (77.7 kg)     Mental Status:  oriented, alert, coherent, logical, and thought processes intact    IV Access:  - None    Nursing Mobility/ADLs:  Walking   Assisted  Transfer  Assisted  Bathing  Assisted  Dressing  Assisted  Toileting  Assisted  Feeding  Independent  Med Admin  Assisted  Med Delivery   whole    Wound Care Documentation and Therapy:  Wound 12/07/17 Skin tear Elbow Right (Active)   Number of days: 1585       Incision 12/07/17 Hip Right (Active)   Number of days: 9922       Wound 04/06/22 Pretibial Left half moon shaped (Active)   Wound Etiology Other 04/11/22 0745   Dressing Status Clean;Dry; Intact 04/11/22 0745   Wound Cleansed Cleansed with saline 04/07/22 0718   Dressing/Treatment Dry dressing 04/11/22 0745   Dressing Change Due 04/07/22 04/07/22 0718   Wound Length (cm) 1.6 cm 04/06/22 2027   Wound Width (cm) 0.6 cm 04/06/22 2027   Wound Depth (cm) 0 cm 04/06/22 2027   Wound Surface Area (cm^2) 0.96 cm^2 04/06/22 2027   Wound Volume (cm^3) 0 cm^3 04/06/22 2027   Number of days: 4        Elimination:  Continence:    Bowel: Yes  Bladder: No  Urinary Catheter: None   Colostomy/Ileostomy/Ileal Conduit: No       Date of Last BM: ***4/9/22    Intake/Output Summary (Last 24 hours) at 4/11/2022 1457  Last data filed at 4/11/2022 1117  Gross per 24 hour   Intake --   Output 250 ml   Net -250 ml     No intake/output data recorded. Safety Concerns: At Risk for Falls    Impairments/Disabilities:      Vision and Hearing    Nutrition Therapy:  Current Nutrition Therapy:   - Oral Diet:  Cardiac    Routes of Feeding: Oral  Liquids: No Restrictions  Daily Fluid Restriction: no  Last Modified Barium Swallow with Video (Video Swallowing Test): not done    Treatments at the Time of Hospital Discharge:   Respiratory Treatments: Three times daily, atrovent    Oxygen Therapy:  is not on home oxygen therapy. Ventilator:    - No ventilator support    Rehab Therapies: Physical Therapy and Occupational Therapy  Weight Bearing Status/Restrictions: No weight bearing restrictions  Other Medical Equipment (for information only, NOT a DME order):  walker  Other Treatments: dry dressing to left   shin    Patient's personal belongings (please select all that are sent with patient):  Glasses    RN SIGNATURE:  Electronically signed by Tonya Thompson RN on 4/11/22 at 5:14 PM EDT    CASE MANAGEMENT/SOCIAL WORK SECTION    Inpatient Status Date: ***    Readmission Risk Assessment Score:  Readmission Risk              Risk of Unplanned Readmission:  0           Discharging to Facility/ Agency   Name:   Address:  Phone:  Fax:    Dialysis Facility (if applicable)   Name:  Address:  Dialysis Schedule:  Phone:  Fax:    / signature: {Esignature:[id number]}    PHYSICIAN SECTION    Prognosis: {Prognosis:8967544536}    Condition at Discharge: [address] [facility] Patient Condition:[id number]}    Rehab Potential (if transferring to Rehab): {Prognosis:8900719768}    Recommended Labs or Other Treatments After Discharge: ***    Physician Certification: I certify the above information and transfer of Sd Rome  is necessary for the continuing treatment of the diagnosis listed and that he requires {Admit to Appropriate Level of Care:20753} for {GREATER/LESS:014363860} 30 days.      Update Admission H&P: No change in H&P    PHYSICIAN SIGNATURE:  Electronically signed by Carmina Zurita MD on 4/11/22 at 2:57 PM EDT

## 2022-04-11 NOTE — PLAN OF CARE
Problem: Skin Integrity:  Goal: Will show no infection signs and symptoms  Outcome: Met This Shift  Goal: Absence of new skin breakdown  Outcome: Met This Shift  Goal: Risk for impaired skin integrity will decrease  Outcome: Met This Shift     Problem: Falls - Risk of:  Goal: Will remain free from falls  Outcome: Met This Shift  Goal: Absence of physical injury  Outcome: Met This Shift     Problem:  Activity:  Goal: Fatigue will decrease  Outcome: Met This Shift     Problem: Cardiac:  Goal: Hemodynamic stability will improve  Outcome: Met This Shift  Goal: Complications related to the disease process, condition or treatment will be avoided or minimized  Outcome: Met This Shift  Goal: Ability to maintain an adequate cardiac output will improve  Outcome: Met This Shift  Goal: Risk factors for ineffective tissue perfusion will decrease  Outcome: Met This Shift     Problem: Coping:  Goal: Level of anxiety will decrease  Outcome: Met This Shift  Goal: Ability to cope will improve  Outcome: Met This Shift  Goal: Ability to establish a method of communication will improve  Outcome: Met This Shift     Problem: Nutritional:  Goal: Consumption of the prescribed amount of daily calories will improve  Outcome: Met This Shift     Problem: Respiratory:  Goal: Complications related to the disease process, condition or treatment will be avoided or minimized  Outcome: Met This Shift  Goal: Ability to maintain a clear airway will improve  Outcome: Met This Shift  Goal: Ability to maintain adequate ventilation will improve  Outcome: Met This Shift     Problem: Fluid Volume:  Goal: Will show no signs or symptoms of fluid imbalance  Outcome: Met This Shift

## 2022-04-11 NOTE — PROGRESS NOTES
Perfect serve message sent to Dr Rosario Olivo covering for Dr Jeane Friend regarding pt sounding wet. Awaiting response.

## 2022-04-11 NOTE — CARE COORDINATION
Social work / Discharge planning:       Patient did not qualify for continuous oxygen for discharge. Liaison for [employer] at [facility] updated. Patient will need negative covid result on the day of discharge.     Electronically signed by ELENITA Alexander on 4/11/2022 at 1:36 PM

## 2022-04-11 NOTE — PROGRESS NOTES
Pulse ox was ___100___% on room air at rest.  Ambulated patient on room air. Oxygen saturation was ___97 -100___% on room air while ambulating.

## 2022-04-11 NOTE — PROGRESS NOTES
XRAY VIEWS OF THE CHEST    3/6/2021 11:31 am    COMPARISON:  08/03/2020 and 05/14/2019    HISTORY:  ORDERING SYSTEM PROVIDED HISTORY: fall  TECHNOLOGIST PROVIDED HISTORY:  Reason for exam:->fall    FINDINGS:  There is a small right pleural effusion. Strandy densities seen at both lung  bases, greater on the right, most likely atelectasis. No definite acute  infiltrate is seen. There is no evidence of left effusion. Lungs are  somewhat hyperinflated, suggestive of COPD. There is mild cardiomegaly. The  heart and mediastinum are not significantly changed and there is no evidence  of vascular congestion. No pneumothorax seen. There is degenerative change in the thoracic spine. Mild compression  deformity at about T10 is noted. This is of indeterminate age but was not  present on 05/14/2019. Impression  1. Small right pleural effusion. Mild bibasilar atelectasis, greater on the  right. 2. Mild cardiomegaly. 3. No pneumothorax. Probable COPD. 4. Mild compression deformity of T10, of indeterminate age but new compared  to May 2019. If clinically indicated, CT of the thoracic spine could be done  for further evaluation. Results for orders placed during the hospital encounter of 04/06/22    XR CHEST PORTABLE    Narrative  EXAMINATION:  ONE XRAY VIEW OF THE CHEST    4/6/2022 4:25 pm    COMPARISON:  12/08/2021. HISTORY:  ORDERING SYSTEM PROVIDED HISTORY: cough and congestion  TECHNOLOGIST PROVIDED HISTORY:  Reason for exam:->cough and congestion    FINDINGS:  Probable small right pleural effusion. Pneumothorax. Cardiomegaly with  pulmonary vascular congestive changes. The osseous structures are without  acute process. Impression  Cardiomegaly with probable pulmonary vascular congestive changes. Probable small right pleural effusion.       Additional Imaging:  none    Lab Review   Recent Results (from the past 24 hour(s))   Basic Metabolic Panel    Collection Time: 04/11/22  3:50 AM   Result Value Ref Range    Sodium 142 132 - 146 mmol/L    Potassium 3.9 3.5 - 5.0 mmol/L    Chloride 105 98 - 107 mmol/L    CO2 27 22 - 29 mmol/L    Anion Gap 10 7 - 16 mmol/L    Glucose 117 (H) 74 - 99 mg/dL    BUN 29 (H) 6 - 23 mg/dL    CREATININE 0.9 0.7 - 1.2 mg/dL    GFR Non-African American >60 >=60 mL/min/1.73    GFR African American >60     Calcium 9.3 8.6 - 10.2 mg/dL   CBC with Auto Differential    Collection Time: 04/11/22  3:50 AM   Result Value Ref Range    WBC 6.9 4.5 - 11.5 E9/L    RBC 3.46 (L) 3.80 - 5.80 E12/L    Hemoglobin 11.3 (L) 12.5 - 16.5 g/dL    Hematocrit 34.4 (L) 37.0 - 54.0 %    MCV 99.4 80.0 - 99.9 fL    MCH 32.7 26.0 - 35.0 pg    MCHC 32.8 32.0 - 34.5 %    RDW 13.9 11.5 - 15.0 fL    Platelets 472 269 - 832 E9/L    MPV 9.6 7.0 - 12.0 fL    Neutrophils % 63.3 43.0 - 80.0 %    Immature Granulocytes % 0.3 0.0 - 5.0 %    Lymphocytes % 25.6 20.0 - 42.0 %    Monocytes % 9.0 2.0 - 12.0 %    Eosinophils % 1.5 0.0 - 6.0 %    Basophils % 0.3 0.0 - 2.0 %    Neutrophils Absolute 4.35 1.80 - 7.30 E9/L    Immature Granulocytes # 0.02 E9/L    Lymphocytes Absolute 1.76 1.50 - 4.00 E9/L    Monocytes Absolute 0.62 0.10 - 0.95 E9/L    Eosinophils Absolute 0.10 0.05 - 0.50 E9/L    Basophils Absolute 0.02 0.00 - 0.20 E9/L     Assessment:     Principal Problem:    Acute respiratory failure with hypoxia (HCC)  Active Problems:    Essential hypertension    Seizure disorder (HCC)    PVD (peripheral vascular disease) (HCC)    CHF (congestive heart failure) (HCC)  Resolved Problems:    * No resolved hospital problems.  *      Plan:   Did get 2 doses of IV lasix yesterday for \"sounding wet\"  Still has the rhonchi I think this is more trouble with his secretions than CHF at this point - labs show intravascular depletion  Add mucolytic  Marlyn Lanes, MD  4/11/2022  6:17 AM

## 2022-04-11 NOTE — PROGRESS NOTES
Occupational Therapy  OT BEDSIDE TREATMENT NOTE      Date:2022  Patient Name: Karl William  MRN: [id number]  : 1922  Room: [address] [place] [address]        Evaluating OT: Krystyna HENDERSON OTR/L #[id number]     Referring Provider: Doyle Cespedes MD  Specific Provider Orders/Date: eval and treat 2022     Diagnosis: Abnormal ECG [R94.31]  Acute respiratory failure with hypoxia ([facility] [address] [J96.01]       Pertinent Medical History:   Past Medical History        Past Medical History:   Diagnosis Date    Hypertension              Precautions:  fall risk     Assessment of current deficits   [x]? Functional mobility             [x]?ADLs           [x]? Strength                  []?Cognition   [x]? Functional transfers           [x]? IADLs         [x]? Safety Awareness   [x]? Endurance   []? Fine Coordination              [x]? Balance      []? Vision/perception   []? Sensation     []? Gross Motor Coordination  []? ROM           []?  Delirium                   []? Motor Control      OT PLAN OF CARE   OT POC based on physician orders, patient diagnosis and results of clinical assessment     Frequency/Duration 2-5 days/wk for 10-14 days PRN   Specific OT Treatment Interventions to include:   * Instruction/training on adapted ADL techniques and AE recommendations to increase functional independence within precautions       * Training on energy conservation strategies, correct breathing pattern and techniques to improve independence/tolerance for self-care routine  * Functional transfer/mobility training/DME recommendations for increased independence, safety, and fall prevention  * Patient/Family education to increase follow through with safety techniques and functional independence  * Recommendation of environmental modifications for increased safety with functional transfers/mobility and ADLs  * Therapeutic exercise to improve motor endurance, ROM, and functional strength for ADLs/functional transfers  * Therapeutic activities to facilitate/challenge dynamic balance, stand tolerance for increased safety and independence with ADLs     Recommended Adaptive Equipment: TBD     Home Living: Pt admit from [facility]. Equipment Owned: walker     Prior Level of Function: assist with ADLs, assist with IADLs-dining dougherty services provided at [facility]. Completed functional mobility with walker     Pain Level: no pain reported     Cognition: Awake and alert. Min cues for safety. Functional Assessment: AM-PAC Daily Activity Raw Score: 18/24    Initial Eval Status  Date: 4/8/2022 Treatment session:   4/11/22  STGs=LTGS  Timeframe 10-14 days      Feeding  independent  Able to manage utensils and cup to drink coffee       Grooming  SBA Pt declined to walk to the bathroom due to fatigue.   supervision   UB Dressing  min A  Assist with donning new gown    SBA   LB Dressing Mod A  Assist donning B socks  Able to lift/cross legs  Pt able to don and doff slipper socks while seated on the side of the bed. Setup    SBA with use of AD as needed   Bathing Mod A     SBA   Toileting Mod A  Incontinent of urine upon arrival SBA to don brief.   Min A   Bed Mobility  Supine to sit: SBA SBA sit to supine   Supervision   Functional Transfers Min A  Sit <> stand from bed Cues for hand placement and safe technique  SBA   Supervision   Functional Mobility Min A  With walker in room Cues for safe wheeled walker management and navigation  SBA using w/w cues for safety. SBA with AD as needed with fair  tolerance   Balance Sitting:        Static: good       Dynamic: good     Standing: fair +   Sitting:        Static: good       Dynamic: good     Standing: good   Activity Tolerance Fair +  Pt limited by overall decreased endurance. O2 sats sitting EOB 96%  No SOB noted after light activity Fair   good during ADL activity.  Pt will verbalize and demonstrate good understanding of ECWS techniques        Comments:  Attempted therapy this AM.  Declined and requesting therapist return later this AM.  Returned and pt eating lunch. Returned again in the PM.  Pt with limited activity due to fatigue. Just walked with nursing. Agreeable to ADL activity while seated on the side of the bed. Returned to bed at the end of the session. Education/treatment:  ADL retraining with facilitation of movement to increase self care skills. Therapeutic activity to address balance and endurance for ADL and transfers. Pt education of energy conservation. · Pt has made  progress towards set goals.        Time In: 1:15  Time Out: 1:30     Min Units   Therapeutic Ex 86543     Therapeutic Activities 78411 5    ADL/Self Care 78905 10 1   Orthotic Management 72765     Neuro Re-Ed 65413     Non-Billable Time     TOTAL TIMED TREATMENT 15 [facility] CARIE/L [id number]

## 2022-05-08 NOTE — DISCHARGE SUMMARY
Patient ID:  Suly Age  [id number]  1 y.o.  6/16/1922    Admit date: 4/6/2022    Discharge date and time: 4/11/2022  6:03 PM     Admission Diagnoses: Abnormal ECG [R94.31]  Acute respiratory failure with hypoxia ([facility] [J96.01]    Discharge Diagnoses:   Patient Active Problem List   Diagnosis    Syncope and collapse    Essential hypertension    Seizure disorder ([facility]    Trauma    Closed fracture of multiple ribs of left side    Hemopneumothorax on left    Closed fracture of left scapula    Multiple fractures    Onychomycosis    Tinea pedis of both feet    PVD (peripheral vascular disease) ([facility]    Dermatitis    Acute combined systolic and diastolic CHF, NYHA class 1 ([facility]    Non-STEMI (non-ST elevated myocardial infarction) ([facility])    First degree AV block    CHF (congestive heart failure) ([facility])    Acute respiratory failure with hypoxia ([facility]       Consults: cardiology    Procedures:   none    Hospital Course:  Pt admitted with CHF diuresed and placed in [facility] as pt was having issues caring for self. See daily notes for specifics     Discharge Exam:  See progress note from today    Disposition: SNF    Condition at Discharge:  stable    Patient Instructions:   Discharge Medication List as of 4/11/2022  5:15 PM      START taking these medications    Details   guaiFENesin (ROBITUSSIN) 200 MG/10ML SOLN oral solution Take 10 mLs by mouth every 4 hours, Disp-840 mL, R-1Normal      bumetanide (BUMEX) 2 MG tablet Take 1 tablet by mouth daily, Disp-30 tablet, R-3Normal         CONTINUE these medications which have CHANGED    Details   metoprolol succinate (TOPROL XL) 25 MG extended release tablet Take 1 tablet by mouth every 12 hours, Disp-30 tablet, R-3Normal         CONTINUE these medications which have NOT CHANGED    Details   aspirin 81 MG chewable tablet Take 2 tablets by mouth daily, Disp-30 tablet, R-3Normal      nitroGLYCERIN (NITROSTAT) 0.4 MG SL tablet up to max of 3 total doses.  If no relief after 1 dose, call 911., Disp-25 tablet, R-3Normal      clopidogrel (PLAVIX) 75 MG tablet Take 1 tablet by mouth daily, Disp-30 tablet, R-3Normal      pantoprazole (PROTONIX) 20 MG tablet Take 1 tablet by mouth every morning (before breakfast), Disp-30 tablet, R-3Normal      Naftifine HCl 2 % CREA Apply 1 g topically daily, Disp-60 g, R-2Normal      clotrimazole-betamethasone (LOTRISONE) 1-0.05 % cream Apply topically 2 times daily. , Disp-45 g, R-3, Normal      finasteride (PROSCAR) 5 MG tablet Take 5 mg by mouth every morning (before breakfast)Historical Med      carBAMazepine (TEGRETOL) 200 MG tablet Take 200 mg by mouth every morning (before breakfast)Historical Med      Multiple Vitamins-Minerals (THERAPEUTIC MULTIVITAMIN-MINERALS) tablet Take 1 tablet by mouth every morning (before breakfast)Historical Med      tamsulosin (FLOMAX) 0.4 MG capsule Take 0.4 mg by mouth Daily with supperHistorical Med         STOP taking these medications       metoprolol tartrate (LOPRESSOR) 25 MG tablet Comments:   Reason for Stopping:         furosemide (LASIX) 40 MG tablet Comments:   Reason for Stopping:             Activity: activity as tolerated  Diet: cardiac diet    Follow-up with Dr Nat Nazario in 1 week.     Note that over 30 minutes was spent in preparing discharge papers, discussing discharge with patient, medication review, etc.    Signed:  Jacquelyn Dozier MD  5/8/2022  9:50 AM

## 2022-05-21 ENCOUNTER — APPOINTMENT (OUTPATIENT)
Dept: GENERAL RADIOLOGY | Age: 87
DRG: 291 | End: 2022-05-21
Payer: MEDICARE

## 2022-05-21 ENCOUNTER — HOSPITAL ENCOUNTER (INPATIENT)
Age: 87
LOS: 1 days | Discharge: HOME HEALTH | DRG: 291 | End: 2022-05-22
Attending: EMERGENCY MEDICINE | Admitting: INTERNAL MEDICINE
Payer: MEDICARE

## 2022-05-21 ENCOUNTER — APPOINTMENT (OUTPATIENT)
Dept: CT IMAGING | Age: 87
DRG: 291 | End: 2022-05-21
Payer: MEDICARE

## 2022-05-21 DIAGNOSIS — I48.91 ATRIAL FIBRILLATION, UNSPECIFIED TYPE (HCC): Primary | ICD-10-CM

## 2022-05-21 DIAGNOSIS — R40.20 LOSS OF CONSCIOUSNESS (HCC): ICD-10-CM

## 2022-05-21 DIAGNOSIS — I50.9 ACUTE ON CHRONIC CONGESTIVE HEART FAILURE, UNSPECIFIED HEART FAILURE TYPE (HCC): ICD-10-CM

## 2022-05-21 LAB
ALBUMIN SERPL-MCNC: 3.7 G/DL (ref 3.5–5.2)
ALP BLD-CCNC: 75 U/L (ref 40–129)
ALT SERPL-CCNC: 15 U/L (ref 0–40)
ANION GAP SERPL CALCULATED.3IONS-SCNC: 7 MMOL/L (ref 7–16)
AST SERPL-CCNC: 25 U/L (ref 0–39)
BACTERIA: NORMAL /HPF
BASOPHILS ABSOLUTE: 0.02 E9/L (ref 0–0.2)
BASOPHILS RELATIVE PERCENT: 0.4 % (ref 0–2)
BILIRUB SERPL-MCNC: 0.5 MG/DL (ref 0–1.2)
BILIRUBIN URINE: NEGATIVE
BLOOD, URINE: NEGATIVE
BUN BLDV-MCNC: 23 MG/DL (ref 6–23)
CALCIUM SERPL-MCNC: 9 MG/DL (ref 8.6–10.2)
CHLORIDE BLD-SCNC: 106 MMOL/L (ref 98–107)
CLARITY: CLEAR
CO2: 30 MMOL/L (ref 22–29)
COLOR: YELLOW
CREAT SERPL-MCNC: 0.8 MG/DL (ref 0.7–1.2)
EOSINOPHILS ABSOLUTE: 0.07 E9/L (ref 0.05–0.5)
EOSINOPHILS RELATIVE PERCENT: 1.2 % (ref 0–6)
GFR AFRICAN AMERICAN: >60
GFR NON-AFRICAN AMERICAN: >60 ML/MIN/1.73
GLUCOSE BLD-MCNC: 107 MG/DL (ref 74–99)
GLUCOSE URINE: NEGATIVE MG/DL
HCT VFR BLD CALC: 36 % (ref 37–54)
HEMOGLOBIN: 11.4 G/DL (ref 12.5–16.5)
IMMATURE GRANULOCYTES #: 0.02 E9/L
IMMATURE GRANULOCYTES %: 0.4 % (ref 0–5)
KETONES, URINE: NEGATIVE MG/DL
LEUKOCYTE ESTERASE, URINE: NEGATIVE
LYMPHOCYTES ABSOLUTE: 1.56 E9/L (ref 1.5–4)
LYMPHOCYTES RELATIVE PERCENT: 27.8 % (ref 20–42)
MCH RBC QN AUTO: 29.9 PG (ref 26–35)
MCHC RBC AUTO-ENTMCNC: 31.7 % (ref 32–34.5)
MCV RBC AUTO: 94.5 FL (ref 80–99.9)
MONOCYTES ABSOLUTE: 0.6 E9/L (ref 0.1–0.95)
MONOCYTES RELATIVE PERCENT: 10.7 % (ref 2–12)
NEUTROPHILS ABSOLUTE: 3.35 E9/L (ref 1.8–7.3)
NEUTROPHILS RELATIVE PERCENT: 59.5 % (ref 43–80)
NITRITE, URINE: NEGATIVE
PDW BLD-RTO: 14 FL (ref 11.5–15)
PH UA: 6.5 (ref 5–9)
PLATELET # BLD: 190 E9/L (ref 130–450)
PMV BLD AUTO: 9.5 FL (ref 7–12)
POTASSIUM REFLEX MAGNESIUM: 4 MMOL/L (ref 3.5–5)
PRO-BNP: ABNORMAL PG/ML (ref 0–450)
PROTEIN UA: NEGATIVE MG/DL
RBC # BLD: 3.81 E12/L (ref 3.8–5.8)
RBC UA: NORMAL /HPF (ref 0–2)
SODIUM BLD-SCNC: 143 MMOL/L (ref 132–146)
SPECIFIC GRAVITY UA: 1.01 (ref 1–1.03)
TOTAL PROTEIN: 6.3 G/DL (ref 6.4–8.3)
TROPONIN, HIGH SENSITIVITY: 28 NG/L (ref 0–11)
TROPONIN, HIGH SENSITIVITY: 29 NG/L (ref 0–11)
UROBILINOGEN, URINE: 0.2 E.U./DL
WBC # BLD: 5.6 E9/L (ref 4.5–11.5)
WBC UA: NORMAL /HPF (ref 0–5)

## 2022-05-21 PROCEDURE — 80053 COMPREHEN METABOLIC PANEL: CPT

## 2022-05-21 PROCEDURE — 84484 ASSAY OF TROPONIN QUANT: CPT

## 2022-05-21 PROCEDURE — 85025 COMPLETE CBC W/AUTO DIFF WBC: CPT

## 2022-05-21 PROCEDURE — 2060000000 HC ICU INTERMEDIATE R&B

## 2022-05-21 PROCEDURE — 83880 ASSAY OF NATRIURETIC PEPTIDE: CPT

## 2022-05-21 PROCEDURE — 93005 ELECTROCARDIOGRAM TRACING: CPT | Performed by: STUDENT IN AN ORGANIZED HEALTH CARE EDUCATION/TRAINING PROGRAM

## 2022-05-21 PROCEDURE — 81001 URINALYSIS AUTO W/SCOPE: CPT

## 2022-05-21 PROCEDURE — 6360000002 HC RX W HCPCS: Performed by: INTERNAL MEDICINE

## 2022-05-21 PROCEDURE — 71045 X-RAY EXAM CHEST 1 VIEW: CPT

## 2022-05-21 PROCEDURE — 70450 CT HEAD/BRAIN W/O DYE: CPT

## 2022-05-21 PROCEDURE — 99285 EMERGENCY DEPT VISIT HI MDM: CPT

## 2022-05-21 PROCEDURE — 6370000000 HC RX 637 (ALT 250 FOR IP): Performed by: INTERNAL MEDICINE

## 2022-05-21 RX ORDER — ENOXAPARIN SODIUM 100 MG/ML
40 INJECTION SUBCUTANEOUS DAILY
Status: DISCONTINUED | OUTPATIENT
Start: 2022-05-22 | End: 2022-05-22 | Stop reason: HOSPADM

## 2022-05-21 RX ORDER — CARBAMAZEPINE 200 MG/1
200 TABLET ORAL
Status: DISCONTINUED | OUTPATIENT
Start: 2022-05-22 | End: 2022-05-22 | Stop reason: HOSPADM

## 2022-05-21 RX ORDER — FUROSEMIDE 10 MG/ML
40 INJECTION INTRAMUSCULAR; INTRAVENOUS ONCE
Status: COMPLETED | OUTPATIENT
Start: 2022-05-21 | End: 2022-05-21

## 2022-05-21 RX ORDER — FINASTERIDE 5 MG/1
5 TABLET, FILM COATED ORAL
Status: DISCONTINUED | OUTPATIENT
Start: 2022-05-22 | End: 2022-05-22 | Stop reason: HOSPADM

## 2022-05-21 RX ORDER — BUMETANIDE 1 MG/1
2 TABLET ORAL DAILY
Status: DISCONTINUED | OUTPATIENT
Start: 2022-05-22 | End: 2022-05-22 | Stop reason: ALTCHOICE

## 2022-05-21 RX ORDER — NITROGLYCERIN 0.4 MG/1
0.4 TABLET SUBLINGUAL EVERY 5 MIN PRN
Status: DISCONTINUED | OUTPATIENT
Start: 2022-05-21 | End: 2022-05-22 | Stop reason: HOSPADM

## 2022-05-21 RX ORDER — TAMSULOSIN HYDROCHLORIDE 0.4 MG/1
0.4 CAPSULE ORAL
Status: DISCONTINUED | OUTPATIENT
Start: 2022-05-21 | End: 2022-05-22 | Stop reason: HOSPADM

## 2022-05-21 RX ORDER — PANTOPRAZOLE SODIUM 20 MG/1
20 TABLET, DELAYED RELEASE ORAL
Status: DISCONTINUED | OUTPATIENT
Start: 2022-05-22 | End: 2022-05-22 | Stop reason: HOSPADM

## 2022-05-21 RX ORDER — M-VIT,TX,IRON,MINS/CALC/FOLIC 27MG-0.4MG
1 TABLET ORAL
Status: DISCONTINUED | OUTPATIENT
Start: 2022-05-22 | End: 2022-05-22 | Stop reason: HOSPADM

## 2022-05-21 RX ORDER — ASPIRIN 81 MG/1
81 TABLET, CHEWABLE ORAL DAILY
Status: DISCONTINUED | OUTPATIENT
Start: 2022-05-22 | End: 2022-05-22 | Stop reason: HOSPADM

## 2022-05-21 RX ORDER — METOPROLOL SUCCINATE 25 MG/1
25 TABLET, EXTENDED RELEASE ORAL EVERY 12 HOURS
Status: DISCONTINUED | OUTPATIENT
Start: 2022-05-21 | End: 2022-05-22 | Stop reason: HOSPADM

## 2022-05-21 RX ADMIN — METOPROLOL SUCCINATE 25 MG: 25 TABLET, EXTENDED RELEASE ORAL at 22:45

## 2022-05-21 RX ADMIN — FUROSEMIDE 40 MG: 10 INJECTION, SOLUTION INTRAMUSCULAR; INTRAVENOUS at 22:50

## 2022-05-21 RX ADMIN — TAMSULOSIN HYDROCHLORIDE 0.4 MG: 0.4 CAPSULE ORAL at 22:45

## 2022-05-21 ASSESSMENT — ENCOUNTER SYMPTOMS
WHEEZING: 0
SORE THROAT: 0
COUGH: 0
EYE PAIN: 0
NAUSEA: 0
SHORTNESS OF BREATH: 0
SINUS PAIN: 0
VOMITING: 0
ABDOMINAL PAIN: 0

## 2022-05-21 ASSESSMENT — PAIN - FUNCTIONAL ASSESSMENT
PAIN_FUNCTIONAL_ASSESSMENT: NONE - DENIES PAIN
PAIN_FUNCTIONAL_ASSESSMENT: NONE - DENIES PAIN

## 2022-05-21 NOTE — ED PROVIDER NOTES
[facility] ENCOUNTER      Pt Name: Shari Sneed  MRN: [id number]  [place] 6/16/1922  Date of evaluation: 5/21/2022      CHIEF COMPLAINT       Chief Complaint   Patient presents with    Fall     found on floor by snf staff x 2 today. increased confusion     Altered Mental Status        HPI  Shari Sneed is a 80 y.o. male with PMHx of CHF (EF 40% on last Echo in 12/2021), first degree AV block, HTN, seizure disorder who presented to the ED by ambulance from the [facility]. Staff states that they found patient on floor 2 times today covered in his own urine. Per staff patient has increased confusion and some altered mental status. On exam, patient is oriented x3. He does not remember falling or having syncopal episodes at his facility. He denies any shortness of breath, chest pain, fever, chills. He states he is on oxygen at home. He does admit to worsening swelling in his bilateral lower legs over the past couple weeks. He denies any pain. Denies any urinary symptoms he denies feeling confused. Except as noted above the remainder of the review of systems was reviewed and negative. Review of Systems   Constitutional: Negative for chills, fatigue and fever. HENT: Negative for congestion, sinus pain and sore throat. Eyes: Negative for pain and visual disturbance. Respiratory: Negative for cough, shortness of breath and wheezing. Cardiovascular: Positive for leg swelling. Negative for chest pain and palpitations. Gastrointestinal: Negative for abdominal pain, nausea and vomiting. Genitourinary: Negative for frequency, hematuria and urgency. Musculoskeletal: Negative for arthralgias and joint swelling. Skin: Negative for rash and wound. Neurological: Positive for syncope. Negative for dizziness, weakness, light-headedness and numbness. Physical Exam  Vitals and nursing note reviewed. fibrillation. His legs were also edematous indication fluid overload and possible CHF exacerbation. CXR was stable when compared to past imaging and CT head was negative for any hemorrhage. Patient was not given any medication for atrial fibrillation because his rate was controlled already. Patient will likely need repeat Echo and started on anticoagulation as well as diuretics. Patient requires continued workup and management of their symptoms and will be admitted to the hospital for further evaluation and treatment.           --------------------------------------------- PAST HISTORY ---------------------------------------------  Past Medical History:  has a past medical history of Hypertension. Past Surgical History:  has a past surgical history that includes hernia repair and hip surgery (Right, 12/07/2017). Social History:  reports that he quit smoking about 77 years ago. He quit after 2.00 years of use. He has never used smokeless tobacco. He reports current alcohol use. He reports that he does not use drugs. Family History: family history is not on file. The patients home medications have been reviewed. Allergies: Patient has no known allergies.     -------------------------------------------------- RESULTS -------------------------------------------------    LABS:  Results for orders placed or performed during the hospital encounter of 05/21/22   CBC with Auto Differential   Result Value Ref Range    WBC 5.6 4.5 - 11.5 E9/L    RBC 3.81 3.80 - 5.80 E12/L    Hemoglobin 11.4 (L) 12.5 - 16.5 g/dL    Hematocrit 36.0 (L) 37.0 - 54.0 %    MCV 94.5 80.0 - 99.9 fL    MCH 29.9 26.0 - 35.0 pg    MCHC 31.7 (L) 32.0 - 34.5 %    RDW 14.0 11.5 - 15.0 fL    Platelets 074 140 - 206 E9/L    MPV 9.5 7.0 - 12.0 fL    Neutrophils % 59.5 43.0 - 80.0 %    Immature Granulocytes % 0.4 0.0 - 5.0 %    Lymphocytes % 27.8 20.0 - 42.0 %    Monocytes % 10.7 2.0 - 12.0 %    Eosinophils % 1.2 0.0 - 6.0 %    Basophils % 0.4 0.0 - 2.0 %    Neutrophils Absolute 3.35 1.80 - 7.30 E9/L    Immature Granulocytes # 0.02 E9/L    Lymphocytes Absolute 1.56 1.50 - 4.00 E9/L    Monocytes Absolute 0.60 0.10 - 0.95 E9/L    Eosinophils Absolute 0.07 0.05 - 0.50 E9/L    Basophils Absolute 0.02 0.00 - 0.20 E9/L   Comprehensive Metabolic Panel w/ Reflex to MG   Result Value Ref Range    Sodium 143 132 - 146 mmol/L    Potassium reflex Magnesium 4.0 3.5 - 5.0 mmol/L    Chloride 106 98 - 107 mmol/L    CO2 30 (H) 22 - 29 mmol/L    Anion Gap 7 7 - 16 mmol/L    Glucose 107 (H) 74 - 99 mg/dL    BUN 23 6 - 23 mg/dL    CREATININE 0.8 0.7 - 1.2 mg/dL    GFR Non-African American >60 >=60 mL/min/1.73    GFR African American >60     Calcium 9.0 8.6 - 10.2 mg/dL    Total Protein 6.3 (L) 6.4 - 8.3 g/dL    Albumin 3.7 3.5 - 5.2 g/dL    Total Bilirubin 0.5 0.0 - 1.2 mg/dL    Alkaline Phosphatase 75 40 - 129 U/L    ALT 15 0 - 40 U/L    AST 25 0 - 39 U/L   Troponin   Result Value Ref Range    Troponin, High Sensitivity 29 (H) 0 - 11 ng/L   Brain Natriuretic Peptide   Result Value Ref Range    Pro-BNP 12,497 (H) 0 - 450 pg/mL   Urinalysis with Microscopic   Result Value Ref Range    Color, UA Yellow Straw/Yellow    Clarity, UA Clear Clear    Glucose, Ur Negative Negative mg/dL    Bilirubin Urine Negative Negative    Ketones, Urine Negative Negative mg/dL    Specific Gravity, UA 1.015 1.005 - 1.030    Blood, Urine Negative Negative    pH, UA 6.5 5.0 - 9.0    Protein, UA Negative Negative mg/dL    Urobilinogen, Urine 0.2 <2.0 E.U./dL    Nitrite, Urine Negative Negative    Leukocyte Esterase, Urine Negative Negative    WBC, UA NONE 0 - 5 /HPF    RBC, UA NONE 0 - 2 /HPF    Bacteria, UA NONE SEEN None Seen /HPF   Troponin   Result Value Ref Range    Troponin, High Sensitivity 28 (H) 0 - 11 ng/L   EKG 12 Lead   Result Value Ref Range    Ventricular Rate 87 BPM    Atrial Rate 111 BPM    QRS Duration 92 ms    Q-T Interval 390 ms    QTc Calculation (Bazett) 469 ms    R Axis -26 degrees    T Axis 114 degrees       RADIOLOGY:  CT Head WO Contrast   Final Result   No acute intracranial hemorrhage or edema. XR CHEST PORTABLE   Final Result   1. Retrocardiac and bibasilar opacities with small bilateral pleural   effusions. Overall similar to prior exam.      2.  Atherosclerotic disease and cardiomegaly. Central pulmonary vascular   congestion             EKG:  This EKG is signed and interpreted by me. Rate: 87  Rhythm: Atrial fibrillation  Interpretation: atrial fibrillation (new onset)  Comparison: changes compared to previous EKG      ------------------------- NURSING NOTES AND VITALS REVIEWED ---------------------------  Date / Time Roomed:  5/21/2022  2:03 PM  ED Bed Assignment:  SANDRA/SANDRA    The nursing notes within the ED encounter and vital signs as below have been reviewed. Patient Vitals for the past 24 hrs:   BP Temp Temp src Pulse Resp SpO2 Height Weight   05/21/22 1846 133/73 97.7 °F (36.5 °C) Oral 86 16 95 % -- --   05/21/22 1412 127/76 97.8 °F (36.6 °C) -- 84 18 97 % 6' (1.829 m) 185 lb (83.9 kg)       Oxygen Saturation Interpretation: Normal    ------------------------------------------ PROGRESS NOTES ------------------------------------------  Re-evaluation(s):  Patients symptoms show no change  Repeat physical examination is not changed    Counseling:  I have spoken with the patient and discussed todays results, in addition to providing specific details for the plan of care and counseling regarding the diagnosis and prognosis. Their questions are answered at this time and they are agreeable with the plan of admission.    --------------------------------- ADDITIONAL PROVIDER NOTES ---------------------------------  Consultations:  Time: 1815. Spoke with Dr. Clarisse Bingham. Discussed case.   They will admit the patient for Dr. Hamilton Prabhakar.  This patient's ED course included: a personal history and physicial examination, re-evaluation prior to disposition, IV medications and cardiac monitoring    This patient has remained hemodynamically stable during their ED course. Diagnosis:  1. Atrial fibrillation, unspecified type ([address]    2. Loss of consciousness ([address]    3. Acute on chronic congestive heart failure, unspecified heart failure type ([address]        Disposition:  Patient's disposition: Admit to telemetry  Patient's condition is stable.            [facility],   Resident  05/21/22 9120

## 2022-05-21 NOTE — ED NOTES
TRACIE faxed to floor. Spoke to Monica Mazariegos who stated she received it. Pt ready to go up.       Scarlett Goldberg RN  05/21/22 8531

## 2022-05-22 VITALS
HEART RATE: 82 BPM | WEIGHT: 185 LBS | TEMPERATURE: 97.4 F | BODY MASS INDEX: 25.06 KG/M2 | OXYGEN SATURATION: 97 % | RESPIRATION RATE: 18 BRPM | DIASTOLIC BLOOD PRESSURE: 60 MMHG | SYSTOLIC BLOOD PRESSURE: 108 MMHG | HEIGHT: 72 IN

## 2022-05-22 LAB
ANION GAP SERPL CALCULATED.3IONS-SCNC: 6 MMOL/L (ref 7–16)
BUN BLDV-MCNC: 22 MG/DL (ref 6–23)
CALCIUM SERPL-MCNC: 8.8 MG/DL (ref 8.6–10.2)
CHLORIDE BLD-SCNC: 104 MMOL/L (ref 98–107)
CO2: 31 MMOL/L (ref 22–29)
CREAT SERPL-MCNC: 0.8 MG/DL (ref 0.7–1.2)
EKG ATRIAL RATE: 111 BPM
EKG Q-T INTERVAL: 390 MS
EKG QRS DURATION: 92 MS
EKG QTC CALCULATION (BAZETT): 469 MS
EKG R AXIS: -26 DEGREES
EKG T AXIS: 114 DEGREES
EKG VENTRICULAR RATE: 87 BPM
GFR AFRICAN AMERICAN: >60
GFR NON-AFRICAN AMERICAN: >60 ML/MIN/1.73
GLUCOSE BLD-MCNC: 114 MG/DL (ref 74–99)
POTASSIUM SERPL-SCNC: 3.7 MMOL/L (ref 3.5–5)
SODIUM BLD-SCNC: 141 MMOL/L (ref 132–146)

## 2022-05-22 PROCEDURE — 36415 COLL VENOUS BLD VENIPUNCTURE: CPT

## 2022-05-22 PROCEDURE — 6370000000 HC RX 637 (ALT 250 FOR IP): Performed by: INTERNAL MEDICINE

## 2022-05-22 PROCEDURE — 2500000003 HC RX 250 WO HCPCS: Performed by: INTERNAL MEDICINE

## 2022-05-22 PROCEDURE — 80048 BASIC METABOLIC PNL TOTAL CA: CPT

## 2022-05-22 PROCEDURE — 6360000002 HC RX W HCPCS: Performed by: INTERNAL MEDICINE

## 2022-05-22 RX ORDER — BUMETANIDE 0.25 MG/ML
2 INJECTION, SOLUTION INTRAMUSCULAR; INTRAVENOUS 2 TIMES DAILY
Status: DISCONTINUED | OUTPATIENT
Start: 2022-05-22 | End: 2022-05-22 | Stop reason: HOSPADM

## 2022-05-22 RX ADMIN — BUMETANIDE 2 MG: 0.25 INJECTION INTRAMUSCULAR; INTRAVENOUS at 09:10

## 2022-05-22 RX ADMIN — ENOXAPARIN SODIUM 40 MG: 100 INJECTION SUBCUTANEOUS at 09:10

## 2022-05-22 RX ADMIN — FINASTERIDE 5 MG: 5 TABLET, FILM COATED ORAL at 05:29

## 2022-05-22 RX ADMIN — ASPIRIN 81 MG: 81 TABLET, CHEWABLE ORAL at 09:09

## 2022-05-22 RX ADMIN — PANTOPRAZOLE SODIUM 20 MG: 20 TABLET, DELAYED RELEASE ORAL at 05:29

## 2022-05-22 RX ADMIN — CARBAMAZEPINE 200 MG: 200 TABLET ORAL at 05:29

## 2022-05-22 RX ADMIN — TAMSULOSIN HYDROCHLORIDE 0.4 MG: 0.4 CAPSULE ORAL at 16:32

## 2022-05-22 RX ADMIN — METOPROLOL SUCCINATE 25 MG: 25 TABLET, EXTENDED RELEASE ORAL at 09:09

## 2022-05-22 RX ADMIN — MULTIPLE VITAMINS W/ MINERALS TAB 1 TABLET: TAB at 05:29

## 2022-05-22 NOTE — DISCHARGE INSTR - COC
Continuity of Care Form    Patient Name: Violette Tracy   :  1922  MRN:  [id number]    Admit date:  2022  Discharge date:  ***    Code Status Order: Prior   Advance Directives:      Admitting Physician:  Ebony Puga MD  PCP: Latia Bowling MD    Discharging Nurse: [facility] Unit/Room#: 9983/1822-Z  Discharging Unit Phone Number: ***    Emergency Contact:   Extended Emergency Contact Information  Primary Emergency Contact: [address], [address]  Home Phone: [phone]  Mobile Phone: [phone]  Relation: Child   needed? No  Secondary Emergency Contact: [facility], [address] Phone: [phone]  Mobile Phone: [phone]  Relation: Other   needed? No    Past Surgical History:  Past Surgical History:   Procedure Laterality Date    HERNIA REPAIR      HIP SURGERY Right 2017    ORIF right hip  Dr. Shahriar Mcdermott       Immunization History:   Immunization History   Administered Date(s) Administered    Tdap (Boostrix, Adacel) 2021       Active Problems:  Patient Active Problem List   Diagnosis Code    Syncope and collapse R55    Essential hypertension I10    Seizure disorder ([facility] G40.909    Trauma T14.90XA    Closed fracture of multiple ribs of left side S22.42XA    Hemopneumothorax on left J94.2    Closed fracture of left scapula S42.102A    Multiple fractures T07. XXXA    Onychomycosis B35.1    Tinea pedis of both feet B35.3    PVD (peripheral vascular disease) ([facility]) I73.9    Dermatitis L30.9    Acute combined systolic and diastolic CHF, NYHA class 1 ([facility]) I50.41    Non-STEMI (non-ST elevated myocardial infarction) ([facility]) I21.4    First degree AV block I44.0    CHF (congestive heart failure) ([facility]) I50.9    Acute respiratory failure with hypoxia ([facility]) J96.01    Atrial fibrillation ([facility]) I48.91       Isolation/Infection:   Isolation            No Isolation          Patient Infection Status       Infection Onset Added Last Indicated Last Indicated By Review Planned Expiration Resolved Resolved By    None active    Resolved    COVID-19 (Rule Out) 04/06/22 04/06/22 04/06/22 COVID-19, Rapid (Ordered)   04/06/22 Rule-Out Test Resulted    COVID-19 (Rule Out) 12/08/21 12/08/21 12/08/21 COVID-19, Rapid (Ordered)   12/08/21 Rule-Out Test Resulted            Nurse Assessment:  Last Vital Signs: BP (!) 143/70   Pulse 85   Temp 98.9 °F (37.2 °C) (Oral)   Resp 18   Ht 6' (1.829 m)   Wt 185 lb (83.9 kg)   SpO2 96%   BMI 25.09 kg/m²     Last documented pain score (0-10 scale):    Last Weight:   Wt Readings from Last 1 Encounters:   05/21/22 185 lb (83.9 kg)     Mental Status:  {IP PT MENTAL STATUS:71937}    IV Access:  { NOEMI IV ACCESS:251238854}    Nursing Mobility/ADLs:  Walking   {CHP DME NBRS:302430727}  Transfer  {CHP DME YVHU:259876688}  Bathing  {CHP DME XYEC:942201302}  Dressing  {CHP DME OZDD:552073298}  Toileting  {CHP DME FSWS:691148423}  Feeding  {P DME CZHJ:211674463}  Med Admin  {P DME KRPZ:459555491}  Med Delivery   { NOEMI MED Delivery:723915213}    Wound Care Documentation and Therapy:  Wound 12/07/17 Skin tear Elbow Right (Active)   Number of days: 1626       Incision 12/07/17 Hip Right (Active)   Number of days: 1226       Wound 04/06/22 Pretibial Left half moon shaped (Active)   Number of days: 45       Incision 05/06/19 Chest Lateral;Left;Upper (Active)   Number of days: 0302        Elimination:  Continence:    Bowel: {YES / ZM:41713}  Bladder: {YES / VQ:79386}  Urinary Catheter: {Urinary Catheter:568107373}   Colostomy/Ileostomy/Ileal Conduit: {YES / DV:15282}       Date of Last BM: ***    Intake/Output Summary (Last 24 hours) at 5/22/2022 0734  Last data filed at 5/22/2022 0539  Gross per 24 hour   Intake --   Output 350 ml   Net -350 ml     I/O last 3 completed shifts:  In: -   Out: 350 [Urine:350]    Safety Concerns:     [address] Safety Concerns:109613790}    Impairments/Disabilities:      [address] Impairments/Disabilities:904081782}    Nutrition Therapy:  Current Nutrition Therapy:   [address] NOEMI Diet List:696919919}    Routes of Feeding: {P DME Other Feedings:453716523}  Liquids: {Slp liquid thickness:41872}  Daily Fluid Restriction: {CHP DME Yes amt example:191630068}  Last Modified Barium Swallow with Video (Video Swallowing Test): {Done Not Done TWML:158942626}    Treatments at the Time of Hospital Discharge:   Respiratory Treatments: ***  Oxygen Therapy:  {Therapy; copd oxygen:58363}  Ventilator:    {[facility] Vent ZKYR:037999997}    Rehab Therapies: {THERAPEUTIC INTERVENTION:2856201117}  Weight Bearing Status/Restrictions: {[facility] Weight Bearin}  Other Medical Equipment (for information only, NOT a DME order):  {EQUIPMENT:218888325}  Other Treatments: ***    Patient's personal belongings (please select all that are sent with patient):  {[facility] DME Belongings:785776728}    RN SIGNATURE:  {Esignature:807642270}    CASE MANAGEMENT/SOCIAL WORK SECTION    Inpatient Status Date: ***    Readmission Risk Assessment Score:  Readmission Risk              Risk of Unplanned Readmission:  14           Discharging to Facility/ Agency   Name:   Address:  Phone:  Fax:    Dialysis Facility (if applicable)   Name:  Address:  Dialysis Schedule:  Phone:  Fax:    / signature: {Esignature:122292892}    PHYSICIAN SECTION    Prognosis: {Prognosis:7010145586}    Condition at Discharge: [address] Patient Condition:872211377}    Rehab Potential (if transferring to Rehab): {Prognosis:6341870296}    Recommended Labs or Other Treatments After Discharge: ***    Physician Certification: I certify the above information and transfer of Gauatm Menard  is necessary for the continuing treatment of the diagnosis listed and that he requires {Admit to Appropriate Level of Care:70839} for {GREATER/LESS:663356989} 30 days.      Update Admission H&P: No change in H&P    PHYSICIAN SIGNATURE:  Electronically signed by Rodriguez Marcos Garima Angelo MD on 5/22/22 at 7:35 AM EDT

## 2022-05-22 NOTE — PROGRESS NOTES
Care coordination: Discharge order noted. [facility] to pick patient up at 5 pm to transport back to [facility] [place]. Herb Feng/ liaison aware. Transport form completed.     Reddy Sierra RN

## 2022-05-22 NOTE — CONSULTS
INPATIENT CONSULT-[facility] CARDIOLOGY    Name: Carlos Garcia    Age: 80 y.o. Date of Admission: 5/21/2022  2:03 PM    Date of Service: 5/22/2022    Reason for Consultation: Persistent atrial fibrillation, acute on chronic HFrEF    Referring Physician: Opal Reddy    History of Present Illness: The patient is a 80y.o. year old male who is not sure why he was admitted. States that he was at home and \"fell on the floor\" Saturday night. Was found on the floor and brought to [facility] where chest x-ray showed pulmonary edema and bilateral pleural effusions. He does note he has significant bilateral lower extremity edema. Was just here around a month and a half ago with new onset atrial fibrillation and acute HFrEF at which time echocardiogram showed ejection fraction 40%, mild aortic stenosis, moderate pulmonary hypertension. Past Medical History:   Hypertension, persistent atrial fibrillation diagnosed April 2022 although not placed on anticoagulation because of age and frailty. Acute HFrEF diagnosed the same month requiring IV diuresis, with echocardiogram at that time showing ejection fraction 40%, mild aortic stenosis, moderate pulm hypertension. He was discharged on oral bumetanide 2 mg daily last month. Review of Systems:     Constitutional: No fever, chills, sweats  Cardiac: As per HPI  Pulmonary: As per HPI  HEENT: No visual disturbances or difficult swallowing  GI: No nausea, vomiting, diarrhea, abdominal pain, rectal bleeding  : No dysuria or hematuria  Endocrine: No excessive thirst, heat or cold intolerance. Musculoskeletal: Joint pain but no muscle aches. No claudication  Skin: No skin breakdown or rashes  Neuro: No headache, confusion, or seizures  Psych: No depression, anxiety      Family History:  History reviewed. No pertinent family history. Social History:  Social History     Socioeconomic History    Marital status:       Spouse name: Not on file    Number of children: tablet 81 mg  81 mg Oral Daily Kym Chen MD        bumetanide [facility]) tablet 2 mg  2 mg Oral Daily Kym Chen MD        carBAMazepine (TEGRETOL) tablet 200 mg  200 mg Oral QAM AC Kym Chen MD   200 mg at 05/22/22 8539    finasteride (PROSCAR) tablet 5 mg  5 mg Oral QAM AC Kym Chen MD   5 mg at 05/22/22 0529    metoprolol succinate (TOPROL XL) extended release tablet 25 mg  25 mg Oral Q12H Kym Chen MD   25 mg at 05/21/22 2245    therapeutic multivitamin-minerals 1 tablet  1 tablet Oral QAM AC Kym Chen MD   1 tablet at 05/22/22 0529    nitroGLYCERIN (NITROSTAT) SL tablet 0.4 mg  0.4 mg SubLINGual Q5 Min PRN Kym Chen MD        pantoprazole (PROTONIX) tablet 20 mg  20 mg Oral QAM AC Kym Chen MD   20 mg at 05/22/22 0529    tamsulosin (FLOMAX) capsule 0.4 mg  0.4 mg Oral Dinner Kym Chen MD   0.4 mg at 05/21/22 2245    enoxaparin (LOVENOX) injection 40 mg  40 mg SubCUTAneous Daily Kym Chen MD             Physical Exam:  BP (!) 143/70   Pulse 85   Temp 98.9 °F (37.2 °C) (Oral)   Resp 18   Ht 6' (1.829 m)   Wt 185 lb (83.9 kg)   SpO2 96%   BMI 25.09 kg/m²   Weight change: Wt Readings from Last 3 Encounters:   05/21/22 185 lb (83.9 kg)   04/10/22 171 lb 4.8 oz (77.7 kg)   12/10/21 173 lb 4.8 oz (78.6 kg)         General: Awake, alert, oriented x3, no acute distress  HEENT: Unremarkable  Neck: No JVD or bruits. Cardiac: Irregular rate and rhythm, normal S1 and S2, 2/6 systolic murmur right upper sternal border. No other extra heart sounds, murmurs, heaves, thrills  Resp: Lungs coarse bilaterally with decreased breath sounds at bases. No wheezing. No accessory muscle use or retraction   Abdomen: soft, nontender, nondistended, no gross organomegaly or mass  Skin: Warm and dry, no cyanosis. 2+ bilateral lower extremity edema, especially in the feet and distal lower extremities.   Musculoskeletal: No identified joint deformity  Neuro: Grossly unremarkable  Psych: Cooperative, and normal affect    Intake/Output:    Intake/Output Summary (Last 24 hours) at 5/22/2022 0719  Last data filed at 5/22/2022 0539  Gross per 24 hour   Intake --   Output 350 ml   Net -350 ml     No intake/output data recorded. Laboratory Tests:  Lab Results   Component Value Date    CREATININE 0.8 05/22/2022    BUN 22 05/22/2022     05/22/2022    K 3.7 05/22/2022     05/22/2022    CO2 31 (H) 05/22/2022     No results for input(s): CKTOTAL, CKMB in the last 72 hours. Invalid input(s): TROPONONI  No results found for: BNP  Lab Results   Component Value Date    WBC 5.6 05/21/2022    RBC 3.81 05/21/2022    HGB 11.4 05/21/2022    HCT 36.0 05/21/2022    MCV 94.5 05/21/2022    MCH 29.9 05/21/2022    MCHC 31.7 05/21/2022    RDW 14.0 05/21/2022     05/21/2022    MPV 9.5 05/21/2022     Recent Labs     05/21/22  1449   ALKPHOS 75   ALT 15   AST 25   PROT 6.3*   BILITOT 0.5   LABALBU 3.7     Lab Results   Component Value Date    MG 2.3 04/06/2022     Lab Results   Component Value Date    PROTIME 13.2 04/06/2022    PROTIME 11.8 10/27/2010    INR 1.2 04/06/2022     No results found for: TSH  No components found for: CHLPL  No results found for: TRIG  No results found for: HDL  No results found for: [address]  Lab Results   Component Value Date    INR 1.2 04/06/2022       Admission ECG reviewed by me revealed atrial fibrillation with controlled ventricular rate and poor R wave progression. ASSESSMENT / PLAN:    #1.  Acute on chronic HFrEF- fluid overloaded by examination and chest x-ray with significant bilateral lower extremity and pulmonary edema. Change oral Bumex to IV Bumex 2 mg now then twice daily. Strict urine output and daily weights. Do not need to repeat echocardiogram.  1.5 L/day fluid maximum.     #2.  Persistent atrial fibrillation- has previously been felt to be at elevated risk for anticoagulation with Eliquis 5 mg twice daily because of age and frailty. Defer to PCP whether or not they feel he should be started on anticoagulation which would be appropriate for patient with his [id number] score. If no anticoagulation, would add clopidogrel 75 mg daily to baby aspirin. #3.  Cardiomyopathy of unclear etiology- ejection fraction 40%. Certainly must consider underlying obstructive CAD causing cardiomyopathy, but the risk benefit ratio of stress testing followed by potential heart catheterization is very poor. Continue metoprolol succinate for cardioprotection and add Entresto and spironolactone once euvolemic. #4. Hypertension-blood pressure currently controlled. No new antihypertensives added. #5.  Continue to follow.     Electronically signed by Lsia Goss MD on 5/22/2022 at 7:19 AM

## 2022-05-22 NOTE — H&P
History and Physical  Internal Medicine  Jens Thompson MD    CHIEF COMPLAINT:  fall      HISTORY OF PRESENT ILLNESS:      The patient is a 80 y.o. male patient of Dr Olena Galaviz who presents with as per ER - Juliann Wick is a 80 y.o. male with PMHx of CHF (EF 40% on last Echo in 12/2021), first degree AV block, HTN, seizure disorder who presented to the ED by ambulance from the [facility]. Staff states that they found patient on floor 2 times today covered in his own urine. Per staff patient has increased confusion and some altered mental status. On exam, patient is oriented x3. He does not remember falling or having syncopal episodes at his facility. He denies any shortness of breath, chest pain, fever, chills. He states he is on oxygen at home. He does admit to worsening swelling in his bilateral lower legs over the past couple weeks. He denies any pain. Denies any urinary symptoms he denies feeling confused.     Of note - he has ABSOLUTELY ZERO degree of confusion he knows me he remembers previous hospitalizations and discussions and treatments        Past Medical History:   Diagnosis Date    Hypertension            Past Surgical History:   Procedure Laterality Date    HERNIA REPAIR      HIP SURGERY Right 12/07/2017    ORIF right hip  Dr. Curt Weathers       Medications Prior to Admission:    Medications Prior to Admission: metoprolol succinate (TOPROL XL) 25 MG extended release tablet, Take 1 tablet by mouth every 12 hours  guaiFENesin (ROBITUSSIN) 200 MG/10ML SOLN oral solution, Take 10 mLs by mouth every 4 hours  bumetanide (BUMEX) 2 MG tablet, Take 1 tablet by mouth daily  aspirin 81 MG chewable tablet, Take 2 tablets by mouth daily  nitroGLYCERIN (NITROSTAT) 0.4 MG SL tablet, up to max of 3 total doses. If no relief after 1 dose, call 911.   clopidogrel (PLAVIX) 75 MG tablet, Take 1 tablet by mouth daily  pantoprazole (PROTONIX) 20 MG tablet, Take 1 tablet by mouth every morning (before and axillary nodes normal.  Neurologic: Grossly normal  Rectal: deferred    LABS:    Recent Results (from the past 24 hour(s))   CBC with Auto Differential    Collection Time: 05/21/22  2:49 PM   Result Value Ref Range    WBC 5.6 4.5 - 11.5 E9/L    RBC 3.81 3.80 - 5.80 E12/L    Hemoglobin 11.4 (L) 12.5 - 16.5 g/dL    Hematocrit 36.0 (L) 37.0 - 54.0 %    MCV 94.5 80.0 - 99.9 fL    MCH 29.9 26.0 - 35.0 pg    MCHC 31.7 (L) 32.0 - 34.5 %    RDW 14.0 11.5 - 15.0 fL    Platelets 113 088 - 914 E9/L    MPV 9.5 7.0 - 12.0 fL    Neutrophils % 59.5 43.0 - 80.0 %    Immature Granulocytes % 0.4 0.0 - 5.0 %    Lymphocytes % 27.8 20.0 - 42.0 %    Monocytes % 10.7 2.0 - 12.0 %    Eosinophils % 1.2 0.0 - 6.0 %    Basophils % 0.4 0.0 - 2.0 %    Neutrophils Absolute 3.35 1.80 - 7.30 E9/L    Immature Granulocytes # 0.02 E9/L    Lymphocytes Absolute 1.56 1.50 - 4.00 E9/L    Monocytes Absolute 0.60 0.10 - 0.95 E9/L    Eosinophils Absolute 0.07 0.05 - 0.50 E9/L    Basophils Absolute 0.02 0.00 - 0.20 E9/L   Comprehensive Metabolic Panel w/ Reflex to MG    Collection Time: 05/21/22  2:49 PM   Result Value Ref Range    Sodium 143 132 - 146 mmol/L    Potassium reflex Magnesium 4.0 3.5 - 5.0 mmol/L    Chloride 106 98 - 107 mmol/L    CO2 30 (H) 22 - 29 mmol/L    Anion Gap 7 7 - 16 mmol/L    Glucose 107 (H) 74 - 99 mg/dL    BUN 23 6 - 23 mg/dL    CREATININE 0.8 0.7 - 1.2 mg/dL    GFR Non-African American >60 >=60 mL/min/1.73    GFR African American >60     Calcium 9.0 8.6 - 10.2 mg/dL    Total Protein 6.3 (L) 6.4 - 8.3 g/dL    Albumin 3.7 3.5 - 5.2 g/dL    Total Bilirubin 0.5 0.0 - 1.2 mg/dL    Alkaline Phosphatase 75 40 - 129 U/L    ALT 15 0 - 40 U/L    AST 25 0 - 39 U/L   Troponin    Collection Time: 05/21/22  2:49 PM   Result Value Ref Range    Troponin, High Sensitivity 29 (H) 0 - 11 ng/L   Brain Natriuretic Peptide    Collection Time: 05/21/22  2:49 PM   Result Value Ref Range    Pro-BNP 12,497 (H) 0 - 450 pg/mL   EKG 12 Lead    Collection Time: 05/21/22  4:31 PM   Result Value Ref Range    Ventricular Rate 87 BPM    Atrial Rate 111 BPM    QRS Duration 92 ms    Q-T Interval 390 ms    QTc Calculation (Bazett) 469 ms    R Axis -26 degrees    T Axis 114 degrees   Urinalysis with Microscopic    Collection Time: 05/21/22  4:36 PM   Result Value Ref Range    Color, UA Yellow Straw/Yellow    Clarity, UA Clear Clear    Glucose, Ur Negative Negative mg/dL    Bilirubin Urine Negative Negative    Ketones, Urine Negative Negative mg/dL    Specific Gravity, UA 1.015 1.005 - 1.030    Blood, Urine Negative Negative    pH, UA 6.5 5.0 - 9.0    Protein, UA Negative Negative mg/dL    Urobilinogen, Urine 0.2 <2.0 E.U./dL    Nitrite, Urine Negative Negative    Leukocyte Esterase, Urine Negative Negative    WBC, UA NONE 0 - 5 /HPF    RBC, UA NONE 0 - 2 /HPF    Bacteria, UA NONE SEEN None Seen /HPF   Troponin    Collection Time: 05/21/22  4:36 PM   Result Value Ref Range    Troponin, High Sensitivity 28 (H) 0 - 11 ng/L   Basic Metabolic Panel    Collection Time: 05/22/22  5:30 AM   Result Value Ref Range    Sodium 141 132 - 146 mmol/L    Potassium 3.7 3.5 - 5.0 mmol/L    Chloride 104 98 - 107 mmol/L    CO2 31 (H) 22 - 29 mmol/L    Anion Gap 6 (L) 7 - 16 mmol/L    Glucose 114 (H) 74 - 99 mg/dL    BUN 22 6 - 23 mg/dL    CREATININE 0.8 0.7 - 1.2 mg/dL    GFR Non-African American >60 >=60 mL/min/1.73    GFR African American >60     Calcium 8.8 8.6 - 10.2 mg/dL       Radiology:     Chest  Xray:  Results for orders placed during the hospital encounter of 03/06/21    XR CHEST (2 VW)    Narrative  EXAMINATION:  TWO XRAY VIEWS OF THE CHEST    3/6/2021 11:31 am    COMPARISON:  08/03/2020 and 05/14/2019    HISTORY:  ORDERING SYSTEM PROVIDED HISTORY: fall  TECHNOLOGIST PROVIDED HISTORY:  Reason for exam:->fall    FINDINGS:  There is a small right pleural effusion. Strandy densities seen at both lung  bases, greater on the right, most likely atelectasis.   No definite acute  infiltrate is seen. There is no evidence of left effusion. Lungs are  somewhat hyperinflated, suggestive of COPD. There is mild cardiomegaly. The  heart and mediastinum are not significantly changed and there is no evidence  of vascular congestion. No pneumothorax seen. There is degenerative change in the thoracic spine. Mild compression  deformity at about T10 is noted. This is of indeterminate age but was not  present on 05/14/2019. Impression  1. Small right pleural effusion. Mild bibasilar atelectasis, greater on the  right. 2. Mild cardiomegaly. 3. No pneumothorax. Probable COPD. 4. Mild compression deformity of T10, of indeterminate age but new compared  to May 2019. If clinically indicated, CT of the thoracic spine could be done  for further evaluation. Results for orders placed during the hospital encounter of 05/21/22    XR CHEST PORTABLE    Narrative  EXAMINATION:  ONE XRAY VIEW OF THE CHEST    5/21/2022 3:03 pm    COMPARISON:  Chest series from April 6, 2022    HISTORY:  ORDERING SYSTEM PROVIDED HISTORY: syncope  TECHNOLOGIST PROVIDED HISTORY:  Reason for exam:->syncope    FINDINGS:  Atherosclerotic disease and cardiomegaly. There is central pulmonary  vascular congestion. Retrocardiac and bibasilar opacities and small bilateral pleural effusions  similar to prior exam.  Background coarse interstitial markings in lung  hyperinflation. There is no obvious pneumothorax. Osseous mineralization is  decreased. No acute osseous or soft tissue findings seen. Impression  1. Retrocardiac and bibasilar opacities with small bilateral pleural  effusions. Overall similar to prior exam.    2.  Atherosclerotic disease and cardiomegaly.   Central pulmonary vascular  congestion      Other:  none      ASSESSMENT:      Principal Problem:    Atrial fibrillation (HCC)  Active Problems:    Essential hypertension    Onychomycosis    CHF (congestive heart failure) ([facility])  Resolved Problems:    * No resolved hospital problems.  *      PLAN:    PT has ABSOLUTELY ZERO interest in being here  I agree  At 89yo no acute \"hospitalization\" issues  Will discharge    Luis Durán MD  7:32 AM  5/22/2022

## 2022-07-06 ENCOUNTER — HOSPITAL ENCOUNTER (EMERGENCY)
Age: 87
Discharge: HOME | End: 2022-07-06
Attending: EMERGENCY MEDICINE
Payer: MEDICARE

## 2022-07-06 ENCOUNTER — APPOINTMENT (OUTPATIENT)
Dept: CT IMAGING | Age: 87
End: 2022-07-06
Payer: MEDICARE

## 2022-07-06 VITALS
HEART RATE: 72 BPM | SYSTOLIC BLOOD PRESSURE: 141 MMHG | RESPIRATION RATE: 16 BRPM | TEMPERATURE: 98.2 F | OXYGEN SATURATION: 96 % | DIASTOLIC BLOOD PRESSURE: 68 MMHG
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
## 2022-07-06 DIAGNOSIS — S01.01XA LACERATION OF SCALP, INITIAL ENCOUNTER: ICD-10-CM

## 2022-07-06 PROCEDURE — 12002 RPR S/N/AX/GEN/TRNK2.6-7.5CM: CPT

## 2022-07-06 PROCEDURE — 70450 CT HEAD/BRAIN W/O DYE: CPT

## 2022-07-06 PROCEDURE — 99284 EMERGENCY DEPT VISIT MOD MDM: CPT

## 2022-07-06 PROCEDURE — 72125 CT NECK SPINE W/O DYE: CPT

## 2022-07-06 RX ORDER — LIDOCAINE HYDROCHLORIDE AND EPINEPHRINE 10; 10 MG/ML; UG/ML
20 INJECTION, SOLUTION INFILTRATION; PERINEURAL ONCE
Status: DISCONTINUED | OUTPATIENT
Start: 2022-07-06 | End: 2022-07-06 | Stop reason: HOSPADM

## 2022-07-06 RX ORDER — LIDOCAINE HYDROCHLORIDE AND EPINEPHRINE 10; 10 MG/ML; UG/ML
20 INJECTION, SOLUTION INFILTRATION; PERINEURAL ONCE
Status: DISCONTINUED | OUTPATIENT
Start: 2022-07-06 | End: 2022-07-06 | Stop reason: CLARIF

## 2022-07-06 ASSESSMENT — ENCOUNTER SYMPTOMS
NAUSEA: 0
VOMITING: 0
SINUS PAIN: 0
DIARRHEA: 0
SHORTNESS OF BREATH: 0
WHEEZING: 0
EYE PAIN: 0
EYE REDNESS: 0
SORE THROAT: 0
BACK PAIN: 0
ABDOMINAL PAIN: 0
COUGH: 0

## 2022-07-06 ASSESSMENT — PAIN SCALES - GENERAL: PAINLEVEL_OUTOF10: 1

## 2022-07-06 NOTE — ED PROVIDER NOTES
Chief Complaint   Patient presents with    Fall     mechanical fall from standing, hit head + head lac on thinners        8-year-old gentleman with past medical history of hypertension, seizure disorder, atrial fibrillation on Eliquis presenting today after a fall. He is living at a nursing facility and tripped and hit his head. He does have a laceration on the back of it. Nothing has made it better or worse, not associate with headache, vision changes, numbness, weakness, tingling. He does not have any other complaints. Review of Systems   Constitutional: Negative for chills and fever. HENT: Negative for ear pain, sinus pain and sore throat. Eyes: Negative for pain and redness. Respiratory: Negative for cough, shortness of breath and wheezing. Cardiovascular: Negative for chest pain. Gastrointestinal: Negative for abdominal pain, diarrhea, nausea and vomiting. Genitourinary: Negative for dysuria and flank pain. Musculoskeletal: Negative for back pain and neck pain. Skin: Positive for wound. Negative for rash. Laceration on back of scalp     Neurological: Negative for seizures and headaches. Hematological: Negative for adenopathy. All other systems reviewed and are negative. Physical Exam  Vitals and nursing note reviewed. Constitutional:       General: He is not in acute distress. Appearance: He is well-developed. HENT:      Head: Normocephalic and atraumatic. Right Ear: External ear normal.      Left Ear: External ear normal.      Mouth/Throat:      Mouth: Mucous membranes are moist.      Pharynx: Oropharynx is clear. Eyes:      Pupils: Pupils are equal, round, and reactive to light. Cardiovascular:      Rate and Rhythm: Normal rate and regular rhythm. Heart sounds: Normal heart sounds. No murmur heard. Pulmonary:      Effort: Pulmonary effort is normal.      Breath sounds: Normal breath sounds.    Abdominal:      General: Bowel sounds are normal.      Palpations: Abdomen is soft. Tenderness: There is no abdominal tenderness. There is no guarding or rebound. Musculoskeletal:         General: No tenderness. Cervical back: Normal range of motion and neck supple. Skin:     General: Skin is warm and dry. Findings: Lesion present. Comments: Laceration posterior scalp   Neurological:      General: No focal deficit present. Mental Status: He is alert and oriented to person, place, and time. Cranial Nerves: No cranial nerve deficit. Motor: No weakness. Gait: Gait normal.          Procedures     Laceration Repair Procedure Note    Indication: Laceration    Procedure: The patient was placed in the appropriate position and anesthesia around the laceration was obtained by infiltration using 1% Lidocaine with epinephrine. The area was then irrigated with normal saline. The laceration was closed with staples. There were no additional lacerations requiring repair. The wound area was then dressed with a sterile dressing. Minimal debridement was preformed, flaps were aligned. No foreign body was identified. Total repaired wound length: 3 cm. Other Items: Staple count: 9    The patient tolerated the procedure well. Complications: None        MDM  Number of Diagnoses or Management Options  Fall, initial encounter  Laceration of scalp, initial encounter  Diagnosis management comments: 8-year-old gentleman with past medical history of hypertension, seizure disorder, atrial fibrillation on Eliquis presenting after a fall. On arrival to emergency room he is hemodynamically stable. As well with strictly mechanical and there were no prodromal symptoms, CT head and neck were obtained that did not demonstrate acute abnormalities. He did have a laceration on the posterior aspect of his scalp that was injected with lidocaine with epi due to bleeding and stable for approximation.   He was given return precautions and verbalized understanding. ED Course as of 07/06/22 1542 Wed Jul 06, 2022   1057 Tetanus was updated 3/6/2021 [MM]      ED Course User Index  [MM] Danielle Little DO        ED Course as of 07/06/22 1542 Wed Jul 06, 2022   1057 Tetanus was updated 3/6/2021 [MM]      ED Course User Index  [MM] Danielle Little DO       --------------------------------------------- PAST HISTORY ---------------------------------------------  Past Medical History:  has a past medical history of Hypertension. Past Surgical History:  has a past surgical history that includes hernia repair and hip surgery (Right, 12/07/2017). Social History:  reports that he quit smoking about 77 years ago. He quit after 2.00 years of use. He has never used smokeless tobacco. He reports current alcohol use. He reports that he does not use drugs. Family History: family history is not on file. The patients home medications have been reviewed. Allergies: Patient has no known allergies. -------------------------------------------------- RESULTS -------------------------------------------------  Labs:  No results found for this visit on 07/06/22. Radiology:  CT Head WO Contrast   Final Result   No acute intracranial abnormality. Cortical atrophy and periventricular leukomalacia. RECOMMENDATIONS:   Unavailable         CT Cervical Spine WO Contrast   Final Result   No acute abnormality of the cervical spine. Multilevel degenerative changes. RECOMMENDATIONS:   Unavailable             ------------------------- NURSING NOTES AND VITALS REVIEWED ---------------------------  Date / Time Roomed:  7/6/2022 10:34 AM  ED Bed Assignment:  19/19    The nursing notes within the ED encounter and vital signs as below have been reviewed.    BP (!) 141/68   Pulse 72   Temp 98.2 °F (36.8 °C) (Oral)   Resp 16   SpO2 96%   Oxygen Saturation Interpretation: Normal      ------------------------------------------ PROGRESS NOTES ------------------------------------------  I have spoken with the patient and discussed todays results, in addition to providing specific details for the plan of care and counseling regarding the diagnosis and prognosis. Their questions are answered at this time and they are agreeable with the plan. I discussed at length with them reasons for immediate return here for re evaluation. They will followup with primary care by calling their office tomorrow. --------------------------------- ADDITIONAL PROVIDER NOTES ---------------------------------  At this time the patient is without objective evidence of an acute process requiring hospitalization or inpatient management. They have remained hemodynamically stable throughout their entire ED visit and are stable for discharge with outpatient follow-up. The plan has been discussed in detail and they are aware of the specific conditions for emergent return, as well as the importance of follow-up. Discharge Medication List as of 7/6/2022 12:37 PM          Diagnosis:  1. Fall, initial encounter    2. Laceration of scalp, initial encounter        Disposition:  Patient's disposition: Discharge to home  Patient's condition is stable.            Kamini Grimes DO  Resident  07/06/22 [id number]